# Patient Record
Sex: MALE | Race: WHITE | NOT HISPANIC OR LATINO | Employment: OTHER | URBAN - METROPOLITAN AREA
[De-identification: names, ages, dates, MRNs, and addresses within clinical notes are randomized per-mention and may not be internally consistent; named-entity substitution may affect disease eponyms.]

---

## 2017-02-07 ENCOUNTER — OFFICE VISIT (OUTPATIENT)
Dept: FAMILY MEDICINE | Facility: CLINIC | Age: 72
End: 2017-02-07
Payer: MEDICARE

## 2017-02-07 VITALS
HEART RATE: 60 BPM | SYSTOLIC BLOOD PRESSURE: 104 MMHG | WEIGHT: 175.06 LBS | RESPIRATION RATE: 16 BRPM | DIASTOLIC BLOOD PRESSURE: 60 MMHG | BODY MASS INDEX: 28.25 KG/M2

## 2017-02-07 DIAGNOSIS — E03.4 HYPOTHYROIDISM DUE TO ACQUIRED ATROPHY OF THYROID: ICD-10-CM

## 2017-02-07 DIAGNOSIS — E78.5 DYSLIPIDEMIA: ICD-10-CM

## 2017-02-07 DIAGNOSIS — I10 ESSENTIAL HYPERTENSION: Primary | ICD-10-CM

## 2017-02-07 DIAGNOSIS — I25.810 CORONARY ARTERY DISEASE INVOLVING CORONARY BYPASS GRAFT OF NATIVE HEART WITHOUT ANGINA PECTORIS: ICD-10-CM

## 2017-02-07 DIAGNOSIS — C92.10 CML (CHRONIC MYELOCYTIC LEUKEMIA): ICD-10-CM

## 2017-02-07 PROCEDURE — 99213 OFFICE O/P EST LOW 20 MIN: CPT | Mod: S$GLB,,, | Performed by: FAMILY MEDICINE

## 2017-02-07 PROCEDURE — 99499 UNLISTED E&M SERVICE: CPT | Mod: S$GLB,,, | Performed by: FAMILY MEDICINE

## 2017-02-07 PROCEDURE — 3078F DIAST BP <80 MM HG: CPT | Mod: S$GLB,,, | Performed by: FAMILY MEDICINE

## 2017-02-07 PROCEDURE — 1159F MED LIST DOCD IN RCRD: CPT | Mod: S$GLB,,, | Performed by: FAMILY MEDICINE

## 2017-02-07 PROCEDURE — 3074F SYST BP LT 130 MM HG: CPT | Mod: S$GLB,,, | Performed by: FAMILY MEDICINE

## 2017-02-07 PROCEDURE — 1157F ADVNC CARE PLAN IN RCRD: CPT | Mod: S$GLB,,, | Performed by: FAMILY MEDICINE

## 2017-02-07 PROCEDURE — 1126F AMNT PAIN NOTED NONE PRSNT: CPT | Mod: S$GLB,,, | Performed by: FAMILY MEDICINE

## 2017-02-07 PROCEDURE — 99999 PR PBB SHADOW E&M-EST. PATIENT-LVL II: CPT | Mod: PBBFAC,,, | Performed by: FAMILY MEDICINE

## 2017-02-07 PROCEDURE — 1160F RVW MEDS BY RX/DR IN RCRD: CPT | Mod: S$GLB,,, | Performed by: FAMILY MEDICINE

## 2017-02-07 NOTE — MR AVS SNAPSHOT
[place] [address] [facility] [address]  Phone: [phone]  Fax: [phone]                  James Quan Jr.   2017 9:00 AM   Office Visit    Description:  Male : 1945   Provider:  Alex Mcallister MD   Department:  [facility]           Reason for Visit     fluctuating BP           Diagnoses this Visit        Comments    Essential hypertension    -  Primary     Hypothyroidism due to acquired atrophy of thyroid         Dyslipidemia         CML (chronic myelocytic leukemia)         Coronary artery disease involving coronary bypass graft of native heart without angina pectoris                To Do List           Future Appointments        Provider Department Dept Phone    8/15/2017 8:45 AM Alex Mcallister MD [facility] [phone]      Goals (5 Years of Data)     None      Follow-Up and Disposition     Return in about 6 months (around 2017).    Follow-up and Disposition History      [facility]     [facility] On Call Nurse Care Line -  Assistance  Registered nurses in the [facility] On Call Center provide clinical advisement, health education, appointment booking, and other advisory services.  Call for this free service at [phone].             Medications           Message regarding Medications     Verify the changes and/or additions to your medication regime listed below are the same as discussed with your clinician today.  If any of these changes or additions are incorrect, please notify your healthcare provider.        STOP taking these medications     meloxicam (MOBIC) 7.5 MG tablet TAKE ONE TABLET BY MOUTH ONCE DAILY    simvastatin (ZOCOR) 20 MG tablet Take 20 mg by mouth every evening.            Verify that the below list of medications is an accurate representation of the medications you are currently taking.  If none reported, the list may be blank. If incorrect, please contact your healthcare  provider. Carry this list with you in case of emergency.           Current Medications     ACETAMINOPHEN (TYLENOL 8 HOUR ORAL) Take 500 mg by mouth daily as needed.    aspirin (ECOTRIN) 81 MG EC tablet Take 81 mg by mouth once daily.    atorvastatin (LIPITOR) 40 MG tablet Take 40 mg by mouth once daily.     b complex vitamins tablet Take 1 tablet by mouth once daily.    beta carotene 13609 UNIT capsule Take 25,000 Units by mouth once daily.    catheter (BARD RUBBER UTILITY CATHETER) 14 Fr Misc 1 Units by Misc.(Non-Drug; Combo Route) route every 7 days.    cholecalciferol, vitamin D3, (VITAMIN D3) 2,000 unit Cap Take 1 capsule by mouth every other day.     cyanocobalamin (VITAMIN B-12) 1000 MCG tablet Take 1,000 mcg by mouth nightly.     DOCOSAHEXANOIC ACID/EPA (FISH OIL ORAL) Take 1,200 mg by mouth once daily.    ferrous sulfate 325 (65 FE) MG EC tablet Take 325 mg by mouth once daily.    folic acid (FOLVITE) 400 MCG tablet Take 400 mcg by mouth once daily.    GLEEVEC 400 mg Tab 400 mg once daily.     ibuprofen (ADVIL,MOTRIN) 800 MG tablet Take 1 tablet (800 mg total) by mouth 3 (three) times daily.    isosorbide mononitrate (IMDUR) 30 MG 24 hr tablet Take 30 mg by mouth once daily.    levothyroxine (SYNTHROID) 112 MCG tablet TAKE 1 TABLET EVERY DAY  BEFORE  BREAKFAST    lidocaine HCL 2% (XYLOCAINE) 2 % jelly Per urethra as needed    losartan (COZAAR) 50 MG tablet Take 50 mg by mouth once daily.    lycopene 10 mg Cap Take 1 capsule by mouth nightly.     nitrofurantoin (MACRODANTIN) 50 MG capsule Take 1 capsule (50 mg total) by mouth every evening.    NITROSTAT 0.4 mg SL tablet Place 0.4 mg under the tongue every 5 (five) minutes as needed.     polyethylene glycol (GLYCOLAX) 17 gram PwPk Take 17 g by mouth once daily.    PRASTERONE, DHEA, (DHEA ORAL) Take 1 tablet by mouth once daily. 50 mg. tablet    RANEXA 500 mg Tb12 Take 500 mg by mouth 2 (two) times daily.           Clinical Reference Information           Your  Vitals Were     BP Pulse Resp Weight BMI    104/60 (BP Location: Left arm, Patient Position: Sitting, BP Method: Manual) 60 16 79.4 kg (175 lb 0.7 oz) 28.25 kg/m2      Blood Pressure          Most Recent Value    BP  104/60      Allergies as of 2/7/2017     Niacin Preparations    Bactrim [Sulfamethoxazole-trimethoprim]      Immunizations Administered on Date of Encounter - 2/7/2017     None      Language Assistance Services     ATTENTION: Language assistance services are available, free of charge. Please call [phone].      ATENCIÓN: Si habla español, tiene a alexis disposición servicios gratuitos de asistencia lingüística. Llame al [phone].     MARGO Ý: N?u b?n nói Ti?ng Vi?t, có các d?ch v? h? tr? ngôn ng? mi?n phí dành cho b?n. G?i s? [phone].         [facility] complies with applicable Federal civil rights laws and does not discriminate on the basis of race, color, national origin, age, disability, or sex.

## 2017-02-07 NOTE — PROGRESS NOTES
Subjective:       Patient ID: James Quan Jr. is a 71 y.o. male.    Chief Complaint: fluctuating BP    Pt is a 71 y.o. male who presents for evaluation and management of   Encounter Diagnoses   Name Primary?    Essential hypertension Yes    Hypothyroidism due to acquired atrophy of thyroid     Dyslipidemia     CML (chronic myelocytic leukemia)    .concerned about elevated BP at work---160s systolic. Ok this am     Doing well on current meds. Denies any side effects. Prevention is up to date.  Review of Systems   Constitutional: Negative for fever.   Respiratory: Negative for shortness of breath.    Cardiovascular: Negative for chest pain.   Gastrointestinal: Negative for anal bleeding and blood in stool.   Genitourinary: Negative for dysuria.   Neurological: Negative for dizziness and light-headedness.       Objective:      Physical Exam   Constitutional: He is oriented to person, place, and time. He appears well-developed and well-nourished.   HENT:   Head: Normocephalic and atraumatic.   Right Ear: External ear normal.   Left Ear: External ear normal.   Nose: Nose normal.   Mouth/Throat: Oropharynx is clear and moist.   Eyes: Conjunctivae and EOM are normal. Pupils are equal, round, and reactive to light. Right eye exhibits no discharge. Left eye exhibits no discharge. No scleral icterus.   Neck: Normal range of motion. Neck supple. No JVD present. No tracheal deviation present. No thyromegaly present.   Cardiovascular: Normal rate, regular rhythm, normal heart sounds and intact distal pulses.    No murmur heard.  Pulmonary/Chest: Effort normal and breath sounds normal. No respiratory distress. He has no wheezes. He has no rales. He exhibits no tenderness.   Abdominal: Soft. Bowel sounds are normal. He exhibits no distension and no mass. There is no tenderness. There is no rebound and no guarding.   Musculoskeletal: Normal range of motion.   Lymphadenopathy:     He has no cervical adenopathy.    Neurological: He is alert and oriented to person, place, and time. He has normal reflexes. He displays normal reflexes. No cranial nerve deficit. He exhibits normal muscle tone. Coordination normal.   Skin: Skin is warm and dry.   Psychiatric: He has a normal mood and affect. His behavior is normal. Judgment and thought content normal.       Assessment:       1. Essential hypertension    2. Hypothyroidism due to acquired atrophy of thyroid    3. Dyslipidemia    4. CML (chronic myelocytic leukemia)        Plan:   James was seen today for fluctuating bp.    Diagnoses and all orders for this visit:    Essential hypertension    Hypothyroidism due to acquired atrophy of thyroid    Dyslipidemia    CML (chronic myelocytic leukemia)    continue same   BP log at home   RTC if condition acutely worsens or any other concerns, otherwise RTC as scheduled    No Follow-up on file.

## 2017-03-14 RX ORDER — LIDOCAINE HYDROCHLORIDE 20 MG/ML
JELLY TOPICAL
Qty: 5 EACH | Refills: 11 | Status: SHIPPED | OUTPATIENT
Start: 2017-03-14 | End: 2018-10-18

## 2017-03-14 RX ORDER — NITROFURANTOIN MACROCRYSTALS 50 MG/1
50 CAPSULE ORAL NIGHTLY
Qty: 90 CAPSULE | Refills: 0 | Status: SHIPPED | OUTPATIENT
Start: 2017-03-14 | End: 2017-06-06 | Stop reason: SDUPTHER

## 2017-03-14 NOTE — TELEPHONE ENCOUNTER
----- Message from Mau Seo sent at 3/14/2017  8:24 AM CDT -----  Contact: Wife - Elsa Quan Jr.  MRN: [id number]  : 1945  PCP: Alex Mcallister  Home Phone      [phone]  Work Phone      Not on file.  Mobile          [phone]      MESSAGE: requesting refills on Lidocaine & Macrodantin -- send to [facility]     Call Elsa @ [phone]    PCP: Ary

## 2017-03-30 DIAGNOSIS — E03.9 HYPOTHYROIDISM, UNSPECIFIED TYPE: Primary | ICD-10-CM

## 2017-03-30 RX ORDER — LEVOTHYROXINE SODIUM 100 UG/1
100 TABLET ORAL DAILY
Qty: 30 TABLET | Refills: 11 | Status: SHIPPED | OUTPATIENT
Start: 2017-03-30 | End: 2017-04-25 | Stop reason: SDUPTHER

## 2017-04-18 RX ORDER — LIDOCAINE HYDROCHLORIDE 20 MG/ML
JELLY TOPICAL
Qty: 180 ML | Refills: 3 | Status: SHIPPED | OUTPATIENT
Start: 2017-04-18 | End: 2018-10-18

## 2017-04-18 RX ORDER — LIDOCAINE HYDROCHLORIDE 20 MG/ML
JELLY TOPICAL
Qty: 180 ML | Refills: 3 | Status: SHIPPED | OUTPATIENT
Start: 2017-04-18 | End: 2017-04-18 | Stop reason: SDUPTHER

## 2017-04-25 DIAGNOSIS — E03.9 HYPOTHYROIDISM, UNSPECIFIED TYPE: ICD-10-CM

## 2017-04-25 NOTE — TELEPHONE ENCOUNTER
LOV: 2/07/2017    Received refill request from mail order pharmacy for Levothyroxine 100 mcg take one tablet by mouth once a day. Advise    Pharmacy: [facility] pharmacy

## 2017-04-25 NOTE — TELEPHONE ENCOUNTER
LOV: 2/07/2017   Received refill request from mail order pharmacy for Levothyroxine 100 mcg take one tablet by mouth once a day. Advise     Pharmacy: [facility] pharmacy

## 2017-04-25 NOTE — TELEPHONE ENCOUNTER
Waiting on patient to return call to verify Levothyroxine dose. Have Levothyroxine 100 mcg and Levothyroxine 112 mcg listed on medication list.

## 2017-04-26 RX ORDER — LEVOTHYROXINE SODIUM 100 UG/1
100 TABLET ORAL DAILY
Qty: 90 TABLET | Refills: 1 | Status: SHIPPED | OUTPATIENT
Start: 2017-04-26 | End: 2018-05-01 | Stop reason: SDUPTHER

## 2017-06-06 NOTE — TELEPHONE ENCOUNTER
----- Message from Cait Agrawal sent at 2017 12:47 PM CDT -----  Contact: self  James Quan Jr.  MRN: [id number]  : 1945  PCP: Alex Mcallister  Home Phone      [phone]  Work Phone      Not on file.  Mobile          [phone]      MESSAGE: NEEDS REFILL ON MICRODANTON    Phone: [phone]    PHARMACY: [facility]

## 2017-06-07 RX ORDER — NITROFURANTOIN MACROCRYSTALS 50 MG/1
50 CAPSULE ORAL NIGHTLY
Qty: 90 CAPSULE | Refills: 0 | Status: SHIPPED | OUTPATIENT
Start: 2017-06-07 | End: 2017-08-01 | Stop reason: SDUPTHER

## 2017-08-01 RX ORDER — NITROFURANTOIN MACROCRYSTALS 50 MG/1
50 CAPSULE ORAL NIGHTLY
Qty: 90 CAPSULE | Refills: 0 | Status: SHIPPED | OUTPATIENT
Start: 2017-08-01 | End: 2017-10-18

## 2017-08-03 ENCOUNTER — PATIENT OUTREACH (OUTPATIENT)
Dept: ADMINISTRATIVE | Facility: HOSPITAL | Age: 72
End: 2017-08-03

## 2017-08-03 NOTE — LETTER
August 14, 2017    James Quan Jr.  [address] [place] [address]             [facility]  [address]  [place] [address]  Phone: [phone] Dear Mr. Quan:    [facility] is committed to your overall health.  To help you get the most out of each of your visits, we will review your information to make sure you are up to date on all of your recommended tests and/or procedures.       Dr. Mcallister has found that you may be due for a tetanus vaccine, a pneumonia vaccine, a Hepatitis C screening and an abdominal aortic aneurysm screening.     If you have had any of the above done at another facility, please bring the records or information with you so that your record at [facility] will be complete.  If you would like to schedule any of these, please contact me.     If you are currently taking medication, please bring it with you to your appointment for review.     Also, if you have any type of Advanced Directives, please bring them with you to your office visit so we may scan them into your chart.       Sincerely,       Anel Roberts LPN Clinical Care Coordinator   [facility]   [phone]

## 2017-10-18 ENCOUNTER — OFFICE VISIT (OUTPATIENT)
Dept: INTERNAL MEDICINE | Facility: CLINIC | Age: 72
End: 2017-10-18
Payer: MEDICARE

## 2017-10-18 VITALS
BODY MASS INDEX: 25.85 KG/M2 | RESPIRATION RATE: 16 BRPM | WEIGHT: 164.69 LBS | HEIGHT: 67 IN | HEART RATE: 36 BPM | DIASTOLIC BLOOD PRESSURE: 38 MMHG | OXYGEN SATURATION: 98 % | SYSTOLIC BLOOD PRESSURE: 98 MMHG

## 2017-10-18 DIAGNOSIS — E78.5 DYSLIPIDEMIA: ICD-10-CM

## 2017-10-18 DIAGNOSIS — C92.10 CML (CHRONIC MYELOCYTIC LEUKEMIA): ICD-10-CM

## 2017-10-18 DIAGNOSIS — Z23 IMMUNIZATION DUE: ICD-10-CM

## 2017-10-18 DIAGNOSIS — Z00.00 ENCOUNTER FOR PREVENTIVE HEALTH EXAMINATION: Primary | ICD-10-CM

## 2017-10-18 DIAGNOSIS — I25.810 CORONARY ARTERY DISEASE INVOLVING CORONARY BYPASS GRAFT OF NATIVE HEART WITHOUT ANGINA PECTORIS: ICD-10-CM

## 2017-10-18 DIAGNOSIS — E03.4 HYPOTHYROIDISM DUE TO ACQUIRED ATROPHY OF THYROID: ICD-10-CM

## 2017-10-18 DIAGNOSIS — C61 CA PROSTATE, ADENOCA: ICD-10-CM

## 2017-10-18 PROCEDURE — 3048F LDL-C <100 MG/DL: CPT | Mod: S$GLB,,, | Performed by: NURSE PRACTITIONER

## 2017-10-18 PROCEDURE — G0439 PPPS, SUBSEQ VISIT: HCPCS | Mod: S$GLB,,, | Performed by: NURSE PRACTITIONER

## 2017-10-18 PROCEDURE — 1159F MED LIST DOCD IN RCRD: CPT | Mod: S$GLB,,, | Performed by: NURSE PRACTITIONER

## 2017-10-18 PROCEDURE — 1126F AMNT PAIN NOTED NONE PRSNT: CPT | Mod: S$GLB,,, | Performed by: NURSE PRACTITIONER

## 2017-10-18 PROCEDURE — 1160F RVW MEDS BY RX/DR IN RCRD: CPT | Mod: S$GLB,,, | Performed by: NURSE PRACTITIONER

## 2017-10-18 PROCEDURE — 99999 PR PBB SHADOW E&M-EST. PATIENT-LVL V: CPT | Mod: PBBFAC,,, | Performed by: NURSE PRACTITIONER

## 2017-10-18 PROCEDURE — 99499 UNLISTED E&M SERVICE: CPT | Mod: S$GLB,,, | Performed by: NURSE PRACTITIONER

## 2017-10-18 PROCEDURE — 4040F PNEUMOC VAC/ADMIN/RCVD: CPT | Mod: S$GLB,,, | Performed by: NURSE PRACTITIONER

## 2017-10-18 PROCEDURE — 90714 TD VACC NO PRESV 7 YRS+ IM: CPT | Mod: S$GLB,,, | Performed by: NURSE PRACTITIONER

## 2017-10-18 PROCEDURE — 3078F DIAST BP <80 MM HG: CPT | Mod: S$GLB,,, | Performed by: NURSE PRACTITIONER

## 2017-10-18 PROCEDURE — 1170F FXNL STATUS ASSESSED: CPT | Mod: S$GLB,,, | Performed by: NURSE PRACTITIONER

## 2017-10-18 PROCEDURE — 90471 IMMUNIZATION ADMIN: CPT | Mod: 59,S$GLB,, | Performed by: NURSE PRACTITIONER

## 2017-10-18 PROCEDURE — 90670 PCV13 VACCINE IM: CPT | Mod: S$GLB,,, | Performed by: NURSE PRACTITIONER

## 2017-10-18 PROCEDURE — 3074F SYST BP LT 130 MM HG: CPT | Mod: S$GLB,,, | Performed by: NURSE PRACTITIONER

## 2017-10-18 PROCEDURE — G0009 ADMIN PNEUMOCOCCAL VACCINE: HCPCS | Mod: 59,S$GLB,, | Performed by: NURSE PRACTITIONER

## 2017-10-18 NOTE — PATIENT INSTRUCTIONS
Controlling High Blood Pressure  High blood pressure (hypertension) is often called the silent killer. This is because many people who have it dont know it. High blood pressure is defined as 140/90 mm Hg or higher. Know your blood pressure and remember to check it regularly. Doing so can save your life. Here are some things you can do to help control your blood pressure.    Choose heart-healthy foods  · Select low-salt, low-fat foods. Limit sodium intake to 2,400 mg per day or the amount suggested by your healthcare provider.  · Limit canned, dried, cured, packaged, and fast foods. These can contain a lot of salt.  · Eat 8 to 10 servings of fruits and vegetables every day.  · Choose lean meats, fish, or chicken.  · Eat whole-grain pasta, brown rice, and beans.  · Eat 2 to 3 servings of low-fat or fat-free dairy products.  · Ask your doctor about the DASH eating plan. This plan helps reduce blood pressure.  · When you go to a restaurant, ask that your meal be prepared with no added salt.  Maintain a healthy weight  · Ask your healthcare provider how many calories to eat a day. Then stick to that number.  · Ask your healthcare provider what weight range is healthiest for you. If you are overweight, a weight loss of only 3% to 5% of your body weight can help lower blood pressure. Generally, a good weight loss goal is to lose 10% of your body weight in a year.  · Limit snacks and sweets.  · Get regular exercise.  Get up and get active  · Choose activities you enjoy. Find ones you can do with friends or family. This includes bicycling, dancing, walking, and jogging.  · Park farther away from building entrances.  · Use stairs instead of the elevator.  · When you can, walk or bike instead of driving.  · [place] leaves, garden, or do household repairs.  · Be active at a moderate to vigorous level of physical activity for at least 40 minutes for a minimum of 3 to 4 days a week.   Manage stress  · Make time to relax and  enjoy life. Find time to laugh.  · Communicate your concerns with your loved ones and your healthcare provider.  · Visit with family and friends, and keep up with hobbies.  Limit alcohol and quit smoking  · Men should have no more than 2 drinks per day.  · Women should have no more than 1 drink per day.  · Talk with your healthcare provider about quitting smoking. Smoking significantly increases your risk for heart disease and stroke. Ask your healthcare provider about community smoking cessation programs and other options.  Medicines  If lifestyle changes arent enough, your healthcare provider may prescribe high blood pressure medicine. Take all medicines as prescribed. If you have any questions about your medicines, ask your healthcare provider before stopping or changing them.   Date Last Reviewed: 4/27/2016  © 5231-9868 [employer]. [address] [place] [address], [place] [address]. All rights reserved. This information is not intended as a substitute for professional medical care. Always follow your healthcare professional's instructions.        Counseling and Referral of Other Preventative  (Italic type indicates deductible and co-insurance are waived)    Patient Name: James Quan  Today's Date: 10/18/2017      SERVICE LIMITATIONS RECOMMENDATION    Vaccines    · Pneumococcal (once after 65)    · Influenza (annually)    · Hepatitis B (if medium/high risk)    · Prevnar 13      Hepatitis B medium/high risk factors:       - End-stage renal disease       - Hemophiliacs who received Factor VII or         IX concentrates       - Clients of institutions for the mentally             retarded       - Persons who live in the same house as          a HepB carrier       - Homosexual men       - Illicit injectable drug abusers     Pneumococcal: Done, no repeat necessary     Influenza: Done, repeat in one year     Hepatitis B: Done, no repeat necessary     Prevnar 13: Done, no repeat necessary    Prostate cancer  screening (annually to age 75)     Prostate specific antigen (PSA) Shared decision making with Provider. Sometimes a co-pay may be required if the patient decides to have this test. The USPSTF no longer recommends prostate cancer screening routinely in medicine: every 1 year    Colorectal cancer screening (to age 75)    · Fecal occult blood test (annual)  · Flexible sigmoidoscopy (5y)  · Screening colonoscopy (10y)  · Barium enema   Recommended to patient, declined    Diabetes self-management training (no USPSTF recommendations)  Requires referral by treating physician for patient with diabetes or renal disease. 10 hours of initial DSMT sessions of no less than 30 minutes each in a continuous 12-month period. 2 hours of follow-up DSMT in subsequent years.  N/A    Glaucoma screening (no USPSTF recommendation)  Diabetes mellitus, family history   , age 50 or over    American, age 65 or over  Recommend follow up with eye care professional regularly    Medical nutrition therapy for diabetes or renal disease (no recommended schedule)  Requires referral by treating physician for patient with diabetes or renal disease or kidney transplant within the past 3 years.  Can be provided in same year as diabetes self-management training (DSMT), and CMS recommends medical nutrition therapy take place after DSMT. Up to 3 hours for initial year and 2 hours in subsequent years.  N/A    Cardiovascular screening blood tests (every 5 years)  · Fasting lipid panel  Order as a panel if possible  Done this year, repeat every year    Diabetes screening tests (at least every 3 years, Medicare covers annually or at 6-month intervals for prediabetic patients)  · Fasting blood sugar (FBS) or glucose tolerance test (GTT)  Patient must be diagnosed with one of the following:       - Hypertension       - Dyslipidemia       - Obesity (BMI 30kg/m2)       - Previous elevated impaired FBS or GTT       ... or any two of the  following:       - Overweight (BMI 25 but <30)       - Family history of diabetes       - Age 65 or older       - History of gestational diabetes or birth of baby weighing more than 9 pounds  N/A    Abdominal aortic aneurysm screening (once)  · Sonogram   Limited to patients who meet one of the following criteria:       - Men who are 65-75 years old and have smoked more than 100 cigarette in their lifetime       - Anyone with a family history of abdominal aortic aneurysm       - Anyone recommended for screening by the USPSTF  Done, no repeat necessary    HIV screening (annually for increased risk patients)  · HIV-1 and HIV-2 by EIA, or JEWEL, rapid antibody test or oral mucosa transudate  Patients must be at increased risk for HIV infection per USPSTF guidelines or pregnant. Tests covered annually for patient at increased risk or as requested by the patient. Pregnant patients may receive up to 3 tests during pregnancy.  Risks discussed, screening is not recommended    Smoking cessation counseling (up to 8 sessions per year)  Patients must be asymptomatic of tobacco-related conditions to receive as a preventative service.  Non-smoker    Subsequent annual wellness visit  At least 12 months since last AWV  Return in one year     The following information is provided to all patients.  This information is to help you find resources for any of the problems found today that may be affecting your health:                Living healthy guide: www.[facility].[place].gov      Understanding Diabetes: www.diabetes.org      Eating healthy: www.cdc.gov/healthyweight      CDC home safety checklist: www.cdc.gov/steadi/patient.html      Agency on Aging: www.goea.[place].[facility]      Alcoholics anonymous (AA): www.aa.org      Physical Activity: www.leticia.nih.gov/mu3ycoe      Tobacco use: www.quitwithusla.org

## 2017-10-18 NOTE — PROGRESS NOTES
"James Quan presented for a  Medicare AWV and comprehensive Health Risk Assessment today. The following components were reviewed and updated:    · Medical history  · Family History  · Social history  · Allergies and Current Medications  · Health Risk Assessment  · Health Maintenance  · Care Team     ** See Completed Assessments for Annual Wellness Visit within the encounter summary.**       The following assessments were completed:  · Living Situation  · CAGE  · Depression Screening  · Timed Get Up and Go  · Whisper Test  · Cognitive Function Screening  · Nutrition Screening  · ADL Screening  · PAQ Screening    Vitals:    10/18/17 1017   BP: (!) 98/38   BP Location: Left arm   Patient Position: Sitting   Pulse: (!) 36   Resp: 16   SpO2: 98%   Weight: 74.7 kg (164 lb 10.9 oz)   Height: 5' 7" (1.702 m)     Body mass index is 25.79 kg/m².  Physical Exam   Constitutional: He is oriented to person, place, and time. Vital signs are normal. He appears well-developed and well-nourished. He is active and cooperative. No distress.   HENT:   Head: Normocephalic and atraumatic.   Right Ear: External ear normal.   Left Ear: External ear normal.   Nose: Nose normal.   Mouth/Throat: Oropharynx is clear and moist and mucous membranes are normal. Normal dentition.   Eyes: Conjunctivae, EOM and lids are normal. Pupils are equal, round, and reactive to light.   Neck: Trachea normal. Neck supple.   Cardiovascular: Regular rhythm, normal heart sounds and intact distal pulses.  Bradycardia present.    No murmur heard.  Pulses:       Radial pulses are 2+ on the right side, and 2+ on the left side.        Dorsalis pedis pulses are 2+ on the right side, and 2+ on the left side.        Posterior tibial pulses are 2+ on the right side, and 2+ on the left side.   Pulmonary/Chest: Effort normal and breath sounds normal. No respiratory distress. He has no wheezes. He has no rales.   Abdominal: Soft. Bowel sounds are normal. He exhibits no " distension. There is no tenderness.   Musculoskeletal: He exhibits no edema or tenderness.   Neurological: He is alert and oriented to person, place, and time. He has normal strength. No cranial nerve deficit. He exhibits normal muscle tone. Coordination and gait normal.   Skin: Skin is warm, dry and intact. No rash noted. No erythema.   Psychiatric: He has a normal mood and affect. His speech is normal and behavior is normal. Judgment and thought content normal. Cognition and memory are normal.   Vitals reviewed.        Diagnoses and health risks identified today and associated recommendations/orders:    1. Encounter for preventive health examination  PLAN: follow a low fat, low cholesterol diet, reduce salt in diet and cooking, improve dietary compliance, use calcium 1 gram daily with Vit D, continue current medications, continue current healthy lifestyle patterns and return for routine annual checkups    2. Immunization due  - (In Office Administered) Td Vaccine - Preservative Free  - (In Office Administered) Pneumococcal Conjugate Vaccine (13 Valent) (IM)    3. Urinary complication  Self cath once daily without complication, followed by urology    4. Dyslipidemia  Stable, followed by cardiology,     5. Coronary artery disease involving coronary bypass graft of native heart without angina pectoris  Stable, followed by cardiolgoy    6. Chronic urethral stricture  See above    7. CA prostate, adenoca  Stable, followed by Urology    8. CML (chronic myelocytic leukemia)  stable    9. Hypothyroidism due to acquired atrophy of thyroid  stable      Provided James with a 5-10 year written screening schedule and personal prevention plan. Recommendations were developed using the USPSTF age appropriate recommendations. Education, counseling, and referrals were provided as needed. After Visit Summary printed and given to patient which includes a list of additional screenings\tests needed.    Return in about 1 year  (around 10/18/2018).    Rosy Frausto, NP

## 2017-10-24 ENCOUNTER — OFFICE VISIT (OUTPATIENT)
Dept: FAMILY MEDICINE | Facility: CLINIC | Age: 72
End: 2017-10-24
Payer: MEDICARE

## 2017-10-24 VITALS
SYSTOLIC BLOOD PRESSURE: 100 MMHG | BODY MASS INDEX: 25.81 KG/M2 | HEIGHT: 67 IN | HEART RATE: 62 BPM | DIASTOLIC BLOOD PRESSURE: 58 MMHG | WEIGHT: 164.44 LBS | RESPIRATION RATE: 20 BRPM

## 2017-10-24 DIAGNOSIS — E78.5 DYSLIPIDEMIA: ICD-10-CM

## 2017-10-24 DIAGNOSIS — T50.905A DRUG-INDUCED BRADYCARDIA: ICD-10-CM

## 2017-10-24 DIAGNOSIS — C92.10 CML (CHRONIC MYELOCYTIC LEUKEMIA): ICD-10-CM

## 2017-10-24 DIAGNOSIS — R00.1 DRUG-INDUCED BRADYCARDIA: ICD-10-CM

## 2017-10-24 DIAGNOSIS — E03.4 HYPOTHYROIDISM DUE TO ACQUIRED ATROPHY OF THYROID: Primary | ICD-10-CM

## 2017-10-24 DIAGNOSIS — I25.810 CORONARY ARTERY DISEASE INVOLVING CORONARY BYPASS GRAFT OF NATIVE HEART WITHOUT ANGINA PECTORIS: ICD-10-CM

## 2017-10-24 DIAGNOSIS — C61 CA PROSTATE, ADENOCA: ICD-10-CM

## 2017-10-24 LAB
T4 FREE SERPL-MCNC: 1.26 NG/DL
TSH SERPL DL<=0.005 MIU/L-ACNC: 0.21 UIU/ML

## 2017-10-24 PROCEDURE — 84439 ASSAY OF FREE THYROXINE: CPT

## 2017-10-24 PROCEDURE — 84443 ASSAY THYROID STIM HORMONE: CPT

## 2017-10-24 PROCEDURE — 99499 UNLISTED E&M SERVICE: CPT | Mod: S$GLB,,, | Performed by: FAMILY MEDICINE

## 2017-10-24 PROCEDURE — 99999 PR PBB SHADOW E&M-EST. PATIENT-LVL III: CPT | Mod: PBBFAC,,, | Performed by: FAMILY MEDICINE

## 2017-10-24 PROCEDURE — 36415 COLL VENOUS BLD VENIPUNCTURE: CPT | Mod: S$GLB,,, | Performed by: FAMILY MEDICINE

## 2017-10-24 PROCEDURE — 99214 OFFICE O/P EST MOD 30 MIN: CPT | Mod: S$GLB,,, | Performed by: FAMILY MEDICINE

## 2017-10-24 RX ORDER — ACETAMINOPHEN 500MG/15ML
LIQUID (ML) ORAL
COMMUNITY
Start: 2017-08-02 | End: 2018-10-18

## 2017-10-24 RX ORDER — RANOLAZINE 1000 MG/1
500 TABLET, FILM COATED, EXTENDED RELEASE ORAL 2 TIMES DAILY
COMMUNITY
Start: 2017-10-07 | End: 2020-11-27

## 2017-10-24 RX ORDER — LOSARTAN POTASSIUM 25 MG/1
50 TABLET ORAL DAILY
Status: ON HOLD | COMMUNITY
End: 2019-05-23 | Stop reason: HOSPADM

## 2017-10-24 NOTE — PROGRESS NOTES
Subjective:       Patient ID: James Quan Jr. is a 72 y.o. male.    Chief Complaint: Follow-up (6 mo)    Pt is a 72 y.o. male who presents for evaluation and management of   Encounter Diagnoses   Name Primary?    Hypothyroidism due to acquired atrophy of thyroid Yes    Dyslipidemia     Coronary artery disease involving coronary bypass graft of native heart without angina pectoris     CML (chronic myelocytic leukemia)     Chronic urethral stricture     CA prostate, adenoca     Drug-induced bradycardia    .HR 36 at last visit. Also low BP, systolic 93.  Last TSH was a little low, reduced his dose synthroid to 100mcg daily. Due for recheck   He cut his ranexa and cozarr  in half on his own, and his HR and BP improved. HR 62 today     Doing well on current meds. Denies any side effects. Prevention is up to date.    Review of Systems   Constitutional: Negative for fever.   Respiratory: Negative for shortness of breath.    Cardiovascular: Negative for chest pain.   Gastrointestinal: Negative for anal bleeding and blood in stool.   Genitourinary: Negative for dysuria.        Self caths    Neurological: Negative for dizziness and light-headedness.       Objective:      Physical Exam   Constitutional: He is oriented to person, place, and time. He appears well-developed and well-nourished.   HENT:   Head: Normocephalic and atraumatic.   Right Ear: External ear normal.   Left Ear: External ear normal.   Nose: Nose normal.   Mouth/Throat: Oropharynx is clear and moist.   Eyes: Conjunctivae and EOM are normal. Pupils are equal, round, and reactive to light. Right eye exhibits no discharge. Left eye exhibits no discharge. No scleral icterus.   Neck: Normal range of motion. Neck supple. No JVD present. No tracheal deviation present. No thyromegaly present.   Cardiovascular: Normal rate, regular rhythm, normal heart sounds and intact distal pulses.    No murmur heard.  Pulmonary/Chest: Effort normal and breath  sounds normal. No respiratory distress. He has no wheezes. He has no rales. He exhibits no tenderness.   Abdominal: Soft. Bowel sounds are normal. He exhibits no distension and no mass. There is no tenderness. There is no rebound and no guarding.   Musculoskeletal: Normal range of motion.   Lymphadenopathy:     He has no cervical adenopathy.   Neurological: He is alert and oriented to person, place, and time. He has normal reflexes. He displays normal reflexes. No cranial nerve deficit. He exhibits normal muscle tone. Coordination normal.   Skin: Skin is warm and dry.   Psychiatric: He has a normal mood and affect. His behavior is normal. Judgment and thought content normal.       Assessment:       1. Hypothyroidism due to acquired atrophy of thyroid    2. Dyslipidemia    3. Coronary artery disease involving coronary bypass graft of native heart without angina pectoris    4. CML (chronic myelocytic leukemia)    5. Chronic urethral stricture    6. CA prostate, adenoca    7. Drug-induced bradycardia        Plan:   James was seen today for follow-up.    Diagnoses and all orders for this visit:    Hypothyroidism due to acquired atrophy of thyroid  -     TSH; Future    Dyslipidemia  statin    Coronary artery disease involving coronary bypass graft of native heart without angina pectoris  Sees [facility]     CML (chronic myelocytic leukemia)  Follows with [facility]     Chronic urethral stricture  Self caths, no recent infections     CA prostate, adenoca  Remission, sees Br[facility]     Drug-induced bradycardia  Resolved with halfing of renexa, continue 500mg       RTC 6 months       No Follow-up on file.

## 2017-12-14 ENCOUNTER — TELEPHONE (OUTPATIENT)
Dept: FAMILY MEDICINE | Facility: CLINIC | Age: 72
End: 2017-12-14

## 2017-12-14 NOTE — TELEPHONE ENCOUNTER
Received blood work from [facility].  It was placed on AD desk.  Per AD, his TSH is normal for him.  Wants patient to stay on current dose of synthroid.  Patient was notified and verbalized understanding.  TSH was 0.28.

## 2017-12-26 NOTE — PROGRESS NOTES
"Test results not available for viewing. Computer saying I need to "download software fore viewing"  These results need to be put back in the computer in the media tab correctly so I can view. Thanks!"

## 2018-01-16 ENCOUNTER — TELEPHONE (OUTPATIENT)
Dept: ADMINISTRATIVE | Facility: HOSPITAL | Age: 73
End: 2018-01-16

## 2018-01-18 ENCOUNTER — TELEPHONE (OUTPATIENT)
Dept: ADMINISTRATIVE | Facility: HOSPITAL | Age: 73
End: 2018-01-18

## 2018-05-01 DIAGNOSIS — E03.9 HYPOTHYROIDISM, UNSPECIFIED TYPE: ICD-10-CM

## 2018-05-01 RX ORDER — LEVOTHYROXINE SODIUM 100 UG/1
TABLET ORAL
Qty: 90 TABLET | Refills: 3 | Status: SHIPPED | OUTPATIENT
Start: 2018-05-01 | End: 2019-05-03 | Stop reason: SDUPTHER

## 2018-06-21 ENCOUNTER — PES CALL (OUTPATIENT)
Dept: ADMINISTRATIVE | Facility: CLINIC | Age: 73
End: 2018-06-21

## 2018-07-24 ENCOUNTER — PES CALL (OUTPATIENT)
Dept: ADMINISTRATIVE | Facility: CLINIC | Age: 73
End: 2018-07-24

## 2018-08-03 ENCOUNTER — PES CALL (OUTPATIENT)
Dept: ADMINISTRATIVE | Facility: CLINIC | Age: 73
End: 2018-08-03

## 2018-10-18 ENCOUNTER — HOSPITAL ENCOUNTER (EMERGENCY)
Facility: HOSPITAL | Age: 73
Discharge: ANOTHER HOSPITAL | End: 2018-10-18
Attending: SURGERY
Payer: MEDICARE

## 2018-10-18 VITALS
SYSTOLIC BLOOD PRESSURE: 128 MMHG | OXYGEN SATURATION: 96 % | DIASTOLIC BLOOD PRESSURE: 50 MMHG | HEART RATE: 52 BPM | RESPIRATION RATE: 16 BRPM | TEMPERATURE: 97 F

## 2018-10-18 DIAGNOSIS — R53.1 WEAKNESS: ICD-10-CM

## 2018-10-18 DIAGNOSIS — I49.9 ARRHYTHMIA: ICD-10-CM

## 2018-10-18 DIAGNOSIS — R55 SYNCOPE: ICD-10-CM

## 2018-10-18 LAB
ALBUMIN SERPL BCP-MCNC: 3.9 G/DL
ALP SERPL-CCNC: 64 U/L
ALT SERPL W/O P-5'-P-CCNC: 31 U/L
ANION GAP SERPL CALC-SCNC: 8 MMOL/L
APTT BLDCRRT: 22.7 SEC
AST SERPL-CCNC: 28 U/L
BASOPHILS # BLD AUTO: 0.02 K/UL
BASOPHILS NFR BLD: 0.3 %
BILIRUB SERPL-MCNC: 0.6 MG/DL
BNP SERPL-MCNC: 111 PG/ML
BUN SERPL-MCNC: 11 MG/DL
CALCIUM SERPL-MCNC: 8.7 MG/DL
CHLORIDE SERPL-SCNC: 106 MMOL/L
CK MB SERPL-MCNC: 2 NG/ML
CK MB SERPL-RTO: 1.6 %
CK SERPL-CCNC: 127 U/L
CK SERPL-CCNC: 127 U/L
CO2 SERPL-SCNC: 27 MMOL/L
CREAT SERPL-MCNC: 1.2 MG/DL
D DIMER PPP IA.FEU-MCNC: 0.43 MG/L FEU
DIFFERENTIAL METHOD: ABNORMAL
EOSINOPHIL # BLD AUTO: 0.1 K/UL
EOSINOPHIL NFR BLD: 1.5 %
ERYTHROCYTE [DISTWIDTH] IN BLOOD BY AUTOMATED COUNT: 14.1 %
EST. GFR  (AFRICAN AMERICAN): >60 ML/MIN/1.73 M^2
EST. GFR  (NON AFRICAN AMERICAN): >60 ML/MIN/1.73 M^2
GLUCOSE SERPL-MCNC: 136 MG/DL
HCT VFR BLD AUTO: 38.2 %
HGB BLD-MCNC: 13.1 G/DL
INR PPP: 1
LYMPHOCYTES # BLD AUTO: 1.2 K/UL
LYMPHOCYTES NFR BLD: 18 %
MAGNESIUM SERPL-MCNC: 2.2 MG/DL
MCH RBC QN AUTO: 34.3 PG
MCHC RBC AUTO-ENTMCNC: 34.3 G/DL
MCV RBC AUTO: 100 FL
MONOCYTES # BLD AUTO: 0.6 K/UL
MONOCYTES NFR BLD: 9.2 %
NEUTROPHILS # BLD AUTO: 4.7 K/UL
NEUTROPHILS NFR BLD: 71 %
PHOSPHATE SERPL-MCNC: 2.4 MG/DL
PLATELET # BLD AUTO: 254 K/UL
PMV BLD AUTO: 9.8 FL
POTASSIUM SERPL-SCNC: 3.9 MMOL/L
PROT SERPL-MCNC: 6.8 G/DL
PROTHROMBIN TIME: 10.1 SEC
RBC # BLD AUTO: 3.82 M/UL
SODIUM SERPL-SCNC: 141 MMOL/L
TROPONIN I SERPL DL<=0.01 NG/ML-MCNC: 0.01 NG/ML
TSH SERPL DL<=0.005 MIU/L-ACNC: 0.58 UIU/ML
WBC # BLD AUTO: 6.61 K/UL

## 2018-10-18 PROCEDURE — 93010 ELECTROCARDIOGRAM REPORT: CPT | Mod: HCNC,76,, | Performed by: INTERNAL MEDICINE

## 2018-10-18 PROCEDURE — 85379 FIBRIN DEGRADATION QUANT: CPT

## 2018-10-18 PROCEDURE — 99285 EMERGENCY DEPT VISIT HI MDM: CPT | Mod: 25

## 2018-10-18 PROCEDURE — 85025 COMPLETE CBC W/AUTO DIFF WBC: CPT

## 2018-10-18 PROCEDURE — 83735 ASSAY OF MAGNESIUM: CPT

## 2018-10-18 PROCEDURE — 84484 ASSAY OF TROPONIN QUANT: CPT

## 2018-10-18 PROCEDURE — 93005 ELECTROCARDIOGRAM TRACING: CPT

## 2018-10-18 PROCEDURE — 25000003 PHARM REV CODE 250: Performed by: SURGERY

## 2018-10-18 PROCEDURE — 96360 HYDRATION IV INFUSION INIT: CPT

## 2018-10-18 PROCEDURE — 84100 ASSAY OF PHOSPHORUS: CPT

## 2018-10-18 PROCEDURE — 83880 ASSAY OF NATRIURETIC PEPTIDE: CPT

## 2018-10-18 PROCEDURE — 82553 CREATINE MB FRACTION: CPT

## 2018-10-18 PROCEDURE — 36415 COLL VENOUS BLD VENIPUNCTURE: CPT

## 2018-10-18 PROCEDURE — 85610 PROTHROMBIN TIME: CPT

## 2018-10-18 PROCEDURE — 85730 THROMBOPLASTIN TIME PARTIAL: CPT

## 2018-10-18 PROCEDURE — 80053 COMPREHEN METABOLIC PANEL: CPT

## 2018-10-18 PROCEDURE — 96361 HYDRATE IV INFUSION ADD-ON: CPT

## 2018-10-18 PROCEDURE — 84443 ASSAY THYROID STIM HORMONE: CPT

## 2018-10-18 PROCEDURE — 82550 ASSAY OF CK (CPK): CPT

## 2018-10-18 RX ORDER — AMOXICILLIN 500 MG
CAPSULE ORAL DAILY
COMMUNITY
End: 2020-05-04 | Stop reason: ALTCHOICE

## 2018-10-18 RX ORDER — CHOLECALCIFEROL (VITAMIN D3) 25 MCG
2000 TABLET ORAL DAILY
COMMUNITY

## 2018-10-18 RX ORDER — SODIUM CHLORIDE 9 MG/ML
1000 INJECTION, SOLUTION INTRAVENOUS
Status: DISCONTINUED | OUTPATIENT
Start: 2018-10-18 | End: 2018-10-18 | Stop reason: HOSPADM

## 2018-10-18 RX ORDER — SODIUM CHLORIDE 9 MG/ML
1000 INJECTION, SOLUTION INTRAVENOUS
Status: COMPLETED | OUTPATIENT
Start: 2018-10-18 | End: 2018-10-18

## 2018-10-18 RX ADMIN — SODIUM CHLORIDE 1000 ML: 0.9 INJECTION, SOLUTION INTRAVENOUS at 10:10

## 2018-10-18 NOTE — ED TRIAGE NOTES
Patient reports he blacked out twice at home this morning prior to arrival. He was sitting down each time this happened.

## 2018-10-18 NOTE — CONSULTS
[facility]  Cardiology  Consult Note    Patient Name: James Quan Jr.  MRN: [id number]  Admission Date: 10/18/2018  Hospital Length of Stay: 0 days  Code Status: No Order   Attending Provider: Castro Quintero MD   Consulting Provider: SURYA Loyd  Primary Care Physician: Alex Mcallister MD  Principal Problem:<principal problem not specified>    Patient information was obtained from patient, past medical records and ER records.     Inpatient consult to Cardiology[facility]  Consult performed by: Harshad Saucedo MD  Consult ordered by: Castro Quintero MD        Subjective:     Chief Complaint: syncope      HPI: 73 year old male presents to ED with 2 episodes of syncope at rest today prompting him to come to ER. It is associated with mild nausea. No chest pain, SOB, or palpitations.     Past Medical History:   Diagnosis Date    Anemia     Aplasia bone marrow     Asthma     CA of prostate     CML (chronic myeloid leukemia)     Coronary artery disease     Heart attack 6/17/2014    Heart failure     Hyperlipidemia     Hypertension     Hypothyroidism     Kidney stones     Prostate cancer     Thyroid disease     Urinary incontinence        Past Surgical History:   Procedure Laterality Date    APPENDECTOMY  1966    CHOLECYSTECTOMY  1980    CORONARY ARTERY BYPASS GRAFT  1988    CORONARY STENT PLACEMENT  1990    CYSTOSCOPY      CYSTOSCOPY N/A 12/3/2014    Performed [facility] at [facility] OR 1ST FLR    CYSTOSCOPY N/A 5/7/2014    [facility] at [facility] OR    DILATION-URETHRA N/A 12/3/2014    Performed by [facility] at [facility] OR 1ST FLR    DILATION-URETHRA N/A 5/7/2014    Performed by [facility] at [facility] OR    heart attack  6/17/2014    INSERTION-CATHETER N/A 5/7/2014    Performed by [facility] at [facility] OR    PROSTATE SURGERY      TURP    radiation      for ca prostate    TONSILLECTOMY      As child    TRANSURETHRAL  RESECTION OF PROSTATE      x3    TRANSURETHRAL RESECTION OF PROSTATE      urethral dilation      VASECTOMY  1979       Review of patient's allergies indicates:   Allergen Reactions    Niacin preparations Rash    Bactrim [sulfamethoxazole-trimethoprim] Hives and Itching       No current facility-administered medications on file prior to encounter.      Current Outpatient Medications on File Prior to Encounter   Medication Sig    ascorbic acid, vitamin C, (VITAMIN C) 100 MG tablet Take 100 mg by mouth once daily.    aspirin (ECOTRIN) 81 MG EC tablet Take 81 mg by mouth once daily.    atorvastatin (LIPITOR) 40 MG tablet Take 40 mg by mouth once daily.     b complex vitamins tablet Take 1 tablet by mouth once daily.    beta carotene 85071 UNIT capsule Take 10,000 Units by mouth once daily.     catheter (BARD RUBBER UTILITY CATHETER) 14 Fr Misc 1 Units by Misc.(Non-Drug; Combo Route) route every 7 days. (Patient taking differently: 1 Units by Misc.(Non-Drug; Combo Route) route every other day. )    cyanocobalamin (VITAMIN B-12) 1000 MCG tablet Take 1,000 mcg by mouth nightly.     ferrous sulfate 325 (65 FE) MG EC tablet Take 325 mg by mouth once daily.    fish oil-omega-3 fatty acids 300-1,000 mg capsule Take by mouth once daily.    folic acid (FOLVITE) 400 MCG tablet Take 400 mcg by mouth once daily.    GLEEVEC 400 mg Tab 400 mg once daily.     isosorbide mononitrate (IMDUR) 30 MG 24 hr tablet Take 30 mg by mouth once daily.    levothyroxine (SYNTHROID) 100 MCG tablet TAKE 1 TABLET EVERY DAY    losartan (COZAAR) 25 MG tablet Take 50 mg by mouth once daily.     lycopene 10 mg Cap Take 1 capsule by mouth nightly.     PRASTERONE, DHEA, (DHEA ORAL) Take 25 mg by mouth once daily. 50 mg. tablet     RANEXA 1,000 mg Tb12     vitamin D (VITAMIN D3) 1000 units Tab Take 2,000 Units by mouth once daily.    NITROSTAT 0.4 mg SL tablet Place 0.4 mg under the tongue every 5 (five) minutes as needed.      [DISCONTINUED] ACETAMINOPHEN (TYLENOL 8 HOUR ORAL) Take 500 mg by mouth daily as needed.    [DISCONTINUED] acetaminophen 500 mg/15 mL Liqd     [DISCONTINUED] cholecalciferol, vitamin D3, (VITAMIN D3) 2,000 unit Cap Take 1 capsule by mouth every other day.     [DISCONTINUED] DOCOSAHEXANOIC ACID/EPA (FISH OIL ORAL) Take 1,200 mg by mouth once daily.    [DISCONTINUED] FLUAD 4628-2932, 65 YR UP,,PF, 45 mcg (15 mcg x 3)/0.5 mL Syrg     [DISCONTINUED] ibuprofen (ADVIL,MOTRIN) 800 MG tablet Take 1 tablet (800 mg total) by mouth 3 (three) times daily.    [DISCONTINUED] lidocaine HCL 2% (XYLOCAINE) 2 % jelly Per urethra as needed    [DISCONTINUED] lidocaine HCL 2% (XYLOCAINE) 2 % jelly Apply 8 mls per catheterization twice a week    [DISCONTINUED] polyethylene glycol (GLYCOLAX) 17 gram PwPk Take 17 g by mouth once daily.     Family History     Problem Relation (Age of Onset)    Diabetes Father        Tobacco Use    Smoking status: Former Smoker     Packs/day: 1.00     Years: 25.00     Pack years: 25.00     Types: Cigarettes     Last attempt to quit: 1988     Years since quittin.4    Smokeless tobacco: Never Used   Substance and Sexual Activity    Alcohol use: No    Drug use: No    Sexual activity: Yes     Partners: Female     ROS   Constitutional: syncope    Eyes: Negative    Respiratory: Negative    Cardiovascular: Negative   Gastrointestinal: Negative   Genitourinary: Negative   Musculoskeletal: Negative   Skin: Negative .   Neurological: Negative   Objective:     Vital Signs (Most Recent):  Temp: 96.8 °F (36 °C) (10/18/18 1006)  Pulse: (!) 52 (10/18/18 1006)  Resp: 18 (10/18/18 1006)  BP: 132/78 (10/18/18 1006)  SpO2: 96 % (10/18/18 1006) Vital Signs (24h Range):  Temp:  [96.8 °F (36 °C)] 96.8 °F (36 °C)  Pulse:  [52] 52  Resp:  [18] 18  SpO2:  [96 %] 96 %  BP: (132)/(78) 132/78        There is no height or weight on file to calculate BMI.    SpO2: 96 %  O2 Device (Oxygen Therapy): room air    No  intake or output data in the 24 hours ending 10/18/18 1153    Lines/Drains/Airways     Peripheral Intravenous Line                 Peripheral IV - Single Lumen 10/18/18 1025 Left Antecubital less than 1 day                Physical Exam  General appearance: alert, appears stated age and cooperative  Head: Normocephalic, without obvious abnormality, atraumatic  Eyes: conjunctivae/corneas clear. PERRL  Neck: no carotid bruit, no JVD and supple, symmetrical, trachea midline  Lungs: clear to auscultation bilaterally, normal respiratory effort  Chest Wall: no tenderness  Heart: slow rate, S1, S2 normal, no murmur, click, rub or gallop  Abdomen: soft, non-tender; bowel sounds normal; no masses,  no organomegaly  Extremities: Extremities normal, atraumatic, no cyanosis, clubbing, or edema  Pulses: Dorsalis Pedis R: 2+ (normal)/L: 2+ (normal)  Skin: Skin color, texture, turgor normal. No rashes or lesions  Neurologic: Normal mood and affect  Alert and oriented X 3  Significant Labs:   ABG: No results for input(s): PH, PCO2, HCO3, POCSATURATED, BE in the last 48 hours., Blood Culture: No results for input(s): LABBLOO in the last 48 hours., BMP:   Recent Labs   Lab 10/18/18  1024   *      K 3.9      CO2 27   BUN 11   CREATININE 1.2   CALCIUM 8.7   MG 2.2   , CMP   Recent Labs   Lab 10/18/18  1024      K 3.9      CO2 27   *   BUN 11   CREATININE 1.2   CALCIUM 8.7   PROT 6.8   ALBUMIN 3.9   BILITOT 0.6   ALKPHOS 64   AST 28   ALT 31   ANIONGAP 8   ESTGFRAFRICA >60   EGFRNONAA >60   , CBC   Recent Labs   Lab 10/18/18  1024   WBC 6.61   HGB 13.1*   HCT 38.2*      , INR   Recent Labs   Lab 10/18/18  1024   INR 1.0   , Lipid Panel No results for input(s): CHOL, HDL, LDLCALC, TRIG, CHOLHDL in the last 48 hours.,   Pathology Results  (Last 10 years)    None      , Troponin   Recent Labs   Lab 10/18/18  1024   TROPONINI 0.008   , All pertinent lab results from the last 24 hours have been  reviewed., None and     Significant Imaging: Cardiac Cath: , CT scan: CT ABDOMEN PELVIS WITH CONTRAST: No results found for this visit on 10/18/18., CT ABDOMEN PELVIS WITHOUT CONTRAST: No results found for this visit on 10/18/18. and , Echocardiogram: , EKG: , Stress Test:, X-Ray: CXR: X-Ray Chest 1 View (CXR): No results found for this visit on 10/18/18. and   Assessment and Plan:     There are no hospital problems to display for this patient.      VTE Risk Mitigation (From admission, onward)    None        Current Facility-Administered Medications   Medication    0.9%  NaCl infusion     Current Outpatient Medications   Medication Sig    ascorbic acid, vitamin C, (VITAMIN C) 100 MG tablet Take 100 mg by mouth once daily.    aspirin (ECOTRIN) 81 MG EC tablet Take 81 mg by mouth once daily.    atorvastatin (LIPITOR) 40 MG tablet Take 40 mg by mouth once daily.     b complex vitamins tablet Take 1 tablet by mouth once daily.    beta carotene 98370 UNIT capsule Take 10,000 Units by mouth once daily.     catheter (BARD RUBBER UTILITY CATHETER) 14 Fr Misc 1 Units by Misc.(Non-Drug; Combo Route) route every 7 days. (Patient taking differently: 1 Units by Misc.(Non-Drug; Combo Route) route every other day. )    cyanocobalamin (VITAMIN B-12) 1000 MCG tablet Take 1,000 mcg by mouth nightly.     ferrous sulfate 325 (65 FE) MG EC tablet Take 325 mg by mouth once daily.    fish oil-omega-3 fatty acids 300-1,000 mg capsule Take by mouth once daily.    folic acid (FOLVITE) 400 MCG tablet Take 400 mcg by mouth once daily.    GLEEVEC 400 mg Tab 400 mg once daily.     isosorbide mononitrate (IMDUR) 30 MG 24 hr tablet Take 30 mg by mouth once daily.    levothyroxine (SYNTHROID) 100 MCG tablet TAKE 1 TABLET EVERY DAY    losartan (COZAAR) 25 MG tablet Take 50 mg by mouth once daily.     lycopene 10 mg Cap Take 1 capsule by mouth nightly.     PRASTERONE, DHEA, (DHEA ORAL) Take 25 mg by mouth once daily. 50 mg. tablet      RANEXA 1,000 mg Tb12     vitamin D (VITAMIN D3) 1000 units Tab Take 2,000 Units by mouth once daily.    NITROSTAT 0.4 mg SL tablet Place 0.4 mg under the tongue every 5 (five) minutes as needed.      New onset Syncope- Sick Sinus S. ;12 lead EKG with bifasicular block with sinus bradycardia. While in the room examining patient. Telemetry revealed multiple long over 4 second pauses and 2-1 AV block. Patient had two more syncopal episodes in ED and nursing staff reports pauses on tele at time of event. (not captured)  Patient is on no AV diamond blocking agents.       Carotid US today with no significant stenosis.   Echo 6/18- normal EF, no significant valvular pathology.   MPI 2017 no reversible ischemia.   LHC 2014- severe 3 vessel CAD S/P CABG occluded SVG and native on rt, patent LIMA to LAD and SVG to diag 1.     PLAN:  Transfer for PM implantation  Use atropine prn on way        SURYA Loyd  Cardiology   [facility]  I attest that I have personally seen and examined this patient. I have reviewed and discussed the management in detail as outlined above.

## 2018-10-18 NOTE — ED PROVIDER NOTES
[facility]                                                 Chief Complaint  73 y.o. male with Loss of Consciousness    History of Present Illness  James Quan Jr. presents to the emergency room with syncope  Patient passed out twice this morning, no history of syncope or LOC per wife  Patient is alert and appropriate, had 2 syncopal episodes in the ER on arrival  Patient blacks out with skipped beats on the telemedicine cardiac monitoring  Patient denies chest pain, patient denies shortness of breath, no neuro deficit  Discussed extensively with Cardiology, transfer for arrhythmia specialist ASAP    The history is provided by the patient   device was not used during this ER visit    Past Medical History   -- Anemia    -- Aplasia bone marrow    -- Asthma    -- CA of prostate    -- CML (chronic myeloid leukemia)    -- Coronary artery disease    -- Heart attack    -- Heart failure    -- Hyperlipidemia    -- Hypertension    -- Hypothyroidism    -- Kidney stones    -- Prostate cancer    -- Thyroid disease    -- Urinary incontinence      Past Surgical History   -- APPENDECTOMY     -- CHOLECYSTECTOMY     -- CORONARY ARTERY BYPASS GRAFT     -- CORONARY STENT PLACEMENT     -- CYSTOSCOPY     -- CYSTOSCOPY     -- CYSTOSCOPY     -- DILATION-URETHRA     -- DILATION-URETHRA     -- heart attack     -- INSERTION-CATHETER     -- PROSTATE SURGERY     -- radiation     -- TONSILLECTOMY     -- TRANSURETHRAL RESECTION OF PROSTATE     -- TRANSURETHRAL RESECTION OF PROSTATE     -- urethral dilation     -- VASECTOMY        Review of patient's allergies   -- Niacin preparations    -- Bactrim [sulfamethoxazole-trimethoprim]       Review of Systems and Physical Exam      Review of Systems  -- Constitution - no fever, denies fatigue, no weakness, no chills  -- Eyes - no tearing or redness, no visual disturbance  -- Ear, Nose - no tinnitus or earache, no nasal congestion or discharge  --  Mouth,Throat - no sore throat, no toothache, normal voice, normal swallowing  -- Respiratory - denies cough and congestion, no shortness of breath, no CARPENTER  -- Cardiovascular - denies chest pain, no palpitations, denies claudication  -- Gastrointestinal - denies abdominal pain, nausea, vomiting, or diarrhea  -- Genitourinary - no dysuria, denies flank pain, no hematuria, no STD risk  -- Musculoskeletal - denies back pain, negative for myalgias and arthralgias   -- Neurological - syncope, no headache, denies weakness or seizure  -- Skin - denies pallor, rash, or changes in skin. no hives or welts noted  -- Psychiatric - Denies SI or HI, no psychosis or fractured thought noted     Vital Signs  His blood pressure is 126/74 and his pulse is 54  His respiration is 16 and oxygen saturation is 96%.     Physical Exam  -- Nursing note and vitals reviewed  -- Constitutional: Appears well-developed and well-nourished  -- Head: Atraumatic. Normocephalic. No obvious abnormality  -- Eyes: Pupils are equal and reactive to light. Normal conjunctiva and lids  -- Cardiac: Normal rate, regular rhythm and normal heart sounds  -- Pulmonary: Normal respiratory effort, breath sounds clear to auscultation  -- Abdominal: Soft, no tenderness. Normal bowel sounds. Normal liver edge  -- Musculoskeletal: Normal range of motion, no effusions. Joints stable   -- Neurological: No focal deficits. Showed good interaction with staff  -- Vascular: Posterior tibial, dorsalis pedis and radial pulses 2+ bilaterally      Emergency Room Course      Lab Results     K 3.9      CO2 27   BUN 11   CREATININE 1.2    (H)   ALKPHOS 64   AST 28   ALT 31   BILITOT 0.6   ALBUMIN 3.9   PROT 6.8   WBC 6.61   HGB 13.1 (L)   HCT 38.2 (L)            CPKMB 2.0   TROPONINI 0.008   INR 1.0    (H)   DDIMER 0.43   MG 2.2   TSH 0.577     EKG  -- EKG shows sinus bradycardia without ST changes    Radiology  -- The US of the carotid  artery showed no significant stenosis    Medications Given  0.9%  NaCl infusion (not administered)   0.9%  NaCl infusion (1,000 mLs Intravenous New Bag 10/18/18 1035)     Cardiovascular [facility] ER Consult  -- Patient was seen by [facility] cardiology today in the ER, likely arrhythmia  -- Please see the [facility] notes for full evaluation of the consult in the ER today     Critical Care ED Physician Time (minutes):  -- Performed by: Castro Quintero M.D.  -- Date/Time: 12:07 PM 10/18/2018   -- Direct Patient Care (Face Time): 5  -- Additional History from Records or Taking Additional History: 5  -- Ordering, Reviewing, and Interpreting Diagnostic Studies: 10  -- Total Time in Documentation: 10  -- Consultation with Other Physicians: 10  -- Consultation with Family Related to Condition: 5  -- Total Critical Care Time: 45    Diagnosis  -- Diagnoses of Syncope, Weakness, and Arrhythmia were pertinent to this visit.    Disposition and Plan  -- Disposition: transfer  -- Condition: stable    This note is dictated on Dragon Natural Speaking word recognition program.  There are word recognition mistakes that are occasionally missed on review.         Castro Quintero MD  10/18/18 8417

## 2018-10-18 NOTE — ED NOTES
Pt stated that he was developing the same sensation as when he passed out earlier, witnessed syncopal event with a sinus pause on the monitor for approx 4 seconds.  Pt regained consciousness with no ill effectes of the event.  No chest pain, n/v or sob.  Dr. ford at bedside. Repeat 12 lead ekg done.

## 2018-12-14 LAB
CHOLEST/HDLC SERPL: 3.4 {RATIO}
CHOLESTEROL, TOTAL: 125
HDLC SERPL-MCNC: 37 MG/DL (ref 35–70)
LDL CHOLESTEROL DIRECT: 74 MG/DL
NON HDL CHOL. (LDL+VLDL): 88
TRIGL SERPL-MCNC: 104 MG/DL
VLDL CHOLESTEROL: 21 MG/DL

## 2019-01-29 ENCOUNTER — OFFICE VISIT (OUTPATIENT)
Dept: FAMILY MEDICINE | Facility: CLINIC | Age: 74
End: 2019-01-29
Payer: MEDICARE

## 2019-01-29 VITALS
HEART RATE: 64 BPM | RESPIRATION RATE: 18 BRPM | HEIGHT: 66 IN | DIASTOLIC BLOOD PRESSURE: 72 MMHG | SYSTOLIC BLOOD PRESSURE: 128 MMHG | BODY MASS INDEX: 28.21 KG/M2 | WEIGHT: 175.5 LBS

## 2019-01-29 DIAGNOSIS — R10.31 RIGHT LOWER QUADRANT ABDOMINAL PAIN: Primary | ICD-10-CM

## 2019-01-29 DIAGNOSIS — Z95.0 S/P PLACEMENT OF CARDIAC PACEMAKER: ICD-10-CM

## 2019-01-29 LAB
ALBUMIN SERPL BCP-MCNC: 4 G/DL
ALP SERPL-CCNC: 68 U/L
ALT SERPL W/O P-5'-P-CCNC: 33 U/L
ANION GAP SERPL CALC-SCNC: 7 MMOL/L
AST SERPL-CCNC: 30 U/L
BASOPHILS # BLD AUTO: 0.03 K/UL
BASOPHILS NFR BLD: 0.4 %
BILIRUB SERPL-MCNC: 0.6 MG/DL
BUN SERPL-MCNC: 15 MG/DL
CALCIUM SERPL-MCNC: 9.1 MG/DL
CHLORIDE SERPL-SCNC: 105 MMOL/L
CO2 SERPL-SCNC: 30 MMOL/L
CREAT SERPL-MCNC: 1.3 MG/DL
DIFFERENTIAL METHOD: ABNORMAL
EOSINOPHIL # BLD AUTO: 0.3 K/UL
EOSINOPHIL NFR BLD: 3.9 %
ERYTHROCYTE [DISTWIDTH] IN BLOOD BY AUTOMATED COUNT: 14.5 %
EST. GFR  (AFRICAN AMERICAN): >60 ML/MIN/1.73 M^2
EST. GFR  (NON AFRICAN AMERICAN): 55 ML/MIN/1.73 M^2
GLUCOSE SERPL-MCNC: 97 MG/DL
HCT VFR BLD AUTO: 39.9 %
HGB BLD-MCNC: 13.4 G/DL
LIPASE SERPL-CCNC: 139 U/L
LYMPHOCYTES # BLD AUTO: 2 K/UL
LYMPHOCYTES NFR BLD: 25.7 %
MCH RBC QN AUTO: 34 PG
MCHC RBC AUTO-ENTMCNC: 33.6 G/DL
MCV RBC AUTO: 101 FL
MONOCYTES # BLD AUTO: 0.9 K/UL
MONOCYTES NFR BLD: 11.5 %
NEUTROPHILS # BLD AUTO: 4.6 K/UL
NEUTROPHILS NFR BLD: 58.5 %
PLATELET # BLD AUTO: 251 K/UL
PMV BLD AUTO: 10.7 FL
POTASSIUM SERPL-SCNC: 4.6 MMOL/L
PROT SERPL-MCNC: 6.9 G/DL
RBC # BLD AUTO: 3.94 M/UL
SODIUM SERPL-SCNC: 142 MMOL/L
WBC # BLD AUTO: 7.86 K/UL

## 2019-01-29 PROCEDURE — 36415 COLL VENOUS BLD VENIPUNCTURE: CPT | Mod: HCNC,S$GLB,, | Performed by: FAMILY MEDICINE

## 2019-01-29 PROCEDURE — 99999 PR PBB SHADOW E&M-EST. PATIENT-LVL IV: CPT | Mod: PBBFAC,HCNC,, | Performed by: FAMILY MEDICINE

## 2019-01-29 PROCEDURE — 36415 PR COLLECTION VENOUS BLOOD,VENIPUNCTURE: ICD-10-PCS | Mod: HCNC,S$GLB,, | Performed by: FAMILY MEDICINE

## 2019-01-29 PROCEDURE — 85025 COMPLETE CBC W/AUTO DIFF WBC: CPT | Mod: HCNC

## 2019-01-29 PROCEDURE — 99214 PR OFFICE/OUTPT VISIT, EST, LEVL IV, 30-39 MIN: ICD-10-PCS | Mod: HCNC,S$GLB,, | Performed by: FAMILY MEDICINE

## 2019-01-29 PROCEDURE — 3078F DIAST BP <80 MM HG: CPT | Mod: HCNC,CPTII,S$GLB, | Performed by: FAMILY MEDICINE

## 2019-01-29 PROCEDURE — 83690 ASSAY OF LIPASE: CPT | Mod: HCNC

## 2019-01-29 PROCEDURE — 3074F PR MOST RECENT SYSTOLIC BLOOD PRESSURE < 130 MM HG: ICD-10-PCS | Mod: HCNC,CPTII,S$GLB, | Performed by: FAMILY MEDICINE

## 2019-01-29 PROCEDURE — 80053 COMPREHEN METABOLIC PANEL: CPT | Mod: HCNC

## 2019-01-29 PROCEDURE — 3078F PR MOST RECENT DIASTOLIC BLOOD PRESSURE < 80 MM HG: ICD-10-PCS | Mod: HCNC,CPTII,S$GLB, | Performed by: FAMILY MEDICINE

## 2019-01-29 PROCEDURE — 1101F PT FALLS ASSESS-DOCD LE1/YR: CPT | Mod: HCNC,CPTII,S$GLB, | Performed by: FAMILY MEDICINE

## 2019-01-29 PROCEDURE — 99214 OFFICE O/P EST MOD 30 MIN: CPT | Mod: HCNC,S$GLB,, | Performed by: FAMILY MEDICINE

## 2019-01-29 PROCEDURE — 3074F SYST BP LT 130 MM HG: CPT | Mod: HCNC,CPTII,S$GLB, | Performed by: FAMILY MEDICINE

## 2019-01-29 PROCEDURE — 99999 PR PBB SHADOW E&M-EST. PATIENT-LVL IV: ICD-10-PCS | Mod: PBBFAC,HCNC,, | Performed by: FAMILY MEDICINE

## 2019-01-29 PROCEDURE — 1101F PR PT FALLS ASSESS DOC 0-1 FALLS W/OUT INJ PAST YR: ICD-10-PCS | Mod: HCNC,CPTII,S$GLB, | Performed by: FAMILY MEDICINE

## 2019-01-29 RX ORDER — METRONIDAZOLE 500 MG/1
500 TABLET ORAL 3 TIMES DAILY
Qty: 30 TABLET | Refills: 0 | Status: SHIPPED | OUTPATIENT
Start: 2019-01-29 | End: 2019-05-19

## 2019-01-29 RX ORDER — CIPROFLOXACIN 500 MG/1
500 TABLET ORAL 2 TIMES DAILY
Qty: 20 TABLET | Refills: 0 | Status: SHIPPED | OUTPATIENT
Start: 2019-01-29 | End: 2019-02-08

## 2019-01-29 NOTE — PROGRESS NOTES
Subjective:       Patient ID: James Quan Jr. is a 73 y.o. male.    Chief Complaint: Groin Pain and Abdominal Pain (R side )    Pt is a 73 y.o. male who presents for evaluation and management of   Encounter Diagnoses   Name Primary?    S/P placement of cardiac pacemaker     Right lower quadrant abdominal pain Yes     Doing well on current meds. Denies any side effects. Prevention is up to date.    Review of Systems   Constitutional: Negative for fever.   Gastrointestinal: Positive for abdominal pain. Negative for blood in stool, diarrhea, nausea and vomiting.        Cramping        Objective:      Physical Exam   Constitutional: He is oriented to person, place, and time. He appears well-developed and well-nourished.   HENT:   Head: Normocephalic and atraumatic.   Right Ear: External ear normal.   Left Ear: External ear normal.   Nose: Nose normal.   Mouth/Throat: Oropharynx is clear and moist.   Eyes: Conjunctivae and EOM are normal. Pupils are equal, round, and reactive to light. Right eye exhibits no discharge. Left eye exhibits no discharge. No scleral icterus.   Neck: Normal range of motion. Neck supple. No JVD present. No tracheal deviation present. No thyromegaly present.   Cardiovascular: Normal rate, regular rhythm, normal heart sounds and intact distal pulses.   No murmur heard.  Pulmonary/Chest: Effort normal and breath sounds normal. No respiratory distress. He has no wheezes. He has no rales. He exhibits no tenderness.   Abdominal: Soft. Bowel sounds are normal. He exhibits no distension and no mass. There is tenderness. There is no rebound and no guarding.   RLQ TTP      Musculoskeletal: Normal range of motion.   Lymphadenopathy:     He has no cervical adenopathy.   Neurological: He is alert and oriented to person, place, and time. He has normal reflexes. He displays normal reflexes. No cranial nerve deficit. He exhibits normal muscle tone. Coordination normal.   Skin: Skin is warm and  dry.   Psychiatric: He has a normal mood and affect. His behavior is normal. Judgment and thought content normal.       Assessment:       1. Right lower quadrant abdominal pain    2. S/P placement of cardiac pacemaker        Plan:   James was seen today for groin pain and abdominal pain.    Diagnoses and all orders for this visit:    Right lower quadrant abdominal pain  -     CBC auto differential; Future  -     Comprehensive metabolic panel; Future  -     Lipase; Future  -     ciprofloxacin HCl (CIPRO) 500 MG tablet; Take 1 tablet (500 mg total) by mouth 2 (two) times daily. for 10 days  -     metroNIDAZOLE (FLAGYL) 500 MG tablet; Take 1 tablet (500 mg total) by mouth 3 (three) times daily.    S/P placement of cardiac pacemaker      Problem List Items Addressed This Visit     S/P placement of cardiac pacemaker      Other Visit Diagnoses     Right lower quadrant abdominal pain    -  Primary    Relevant Medications    ciprofloxacin HCl (CIPRO) 500 MG tablet    metroNIDAZOLE (FLAGYL) 500 MG tablet    Other Relevant Orders    CBC auto differential    Comprehensive metabolic panel    Lipase        No Follow-up on file.

## 2019-01-31 ENCOUNTER — HOSPITAL ENCOUNTER (OUTPATIENT)
Dept: RADIOLOGY | Facility: HOSPITAL | Age: 74
Discharge: HOME | End: 2019-01-31
Attending: FAMILY MEDICINE
Payer: MEDICARE

## 2019-01-31 ENCOUNTER — TELEPHONE (OUTPATIENT)
Dept: FAMILY MEDICINE | Facility: CLINIC | Age: 74
End: 2019-01-31

## 2019-01-31 DIAGNOSIS — R10.9 ABDOMINAL PAIN, UNSPECIFIED ABDOMINAL LOCATION: Primary | ICD-10-CM

## 2019-01-31 DIAGNOSIS — R10.9 ABDOMINAL PAIN, UNSPECIFIED ABDOMINAL LOCATION: ICD-10-CM

## 2019-01-31 PROCEDURE — 25500020 PHARM REV CODE 255: Mod: HCNC | Performed by: FAMILY MEDICINE

## 2019-01-31 PROCEDURE — 74177 CT ABD & PELVIS W/CONTRAST: CPT | Mod: TC,HCNC

## 2019-01-31 RX ADMIN — IOHEXOL 75 ML: 350 INJECTION, SOLUTION INTRAVENOUS at 04:01

## 2019-01-31 RX ADMIN — IOHEXOL 30 ML: 350 INJECTION, SOLUTION INTRAVENOUS at 04:01

## 2019-01-31 NOTE — TELEPHONE ENCOUNTER
----- Message from Alex Mcallister MD sent at 1/31/2019  9:10 AM CST -----  Labs look good, except that his lipase is a litle elevated. This comes from the pancreas mostly, so I would like to do a CT scan today to make sure no pancreatitis. Please call Mr. Rosado and schedule today thanks !

## 2019-01-31 NOTE — TELEPHONE ENCOUNTER
Patient notified, will go to [place] to obtain STAT CT as soon as his relief from work shows up. States he should be at [facility] for about 3:30pm. Notified radiology staff.

## 2019-01-31 NOTE — PROGRESS NOTES
Labs look good, except that his lipase is a litle elevated. This comes from the pancreas mostly, so I would like to do a CT scan today to make sure no pancreatitis. Please call Mr. Mallorynie and schedule today thanks !

## 2019-02-04 ENCOUNTER — TELEPHONE (OUTPATIENT)
Dept: FAMILY MEDICINE | Facility: CLINIC | Age: 74
End: 2019-02-04

## 2019-02-04 ENCOUNTER — OFFICE VISIT (OUTPATIENT)
Dept: FAMILY MEDICINE | Facility: CLINIC | Age: 74
End: 2019-02-04
Payer: MEDICARE

## 2019-02-04 VITALS
SYSTOLIC BLOOD PRESSURE: 116 MMHG | RESPIRATION RATE: 16 BRPM | BODY MASS INDEX: 28.76 KG/M2 | DIASTOLIC BLOOD PRESSURE: 64 MMHG | WEIGHT: 178.19 LBS | HEART RATE: 68 BPM

## 2019-02-04 DIAGNOSIS — K58.0 IRRITABLE BOWEL SYNDROME WITH DIARRHEA: ICD-10-CM

## 2019-02-04 DIAGNOSIS — R10.31 RIGHT LOWER QUADRANT ABDOMINAL PAIN: Primary | ICD-10-CM

## 2019-02-04 DIAGNOSIS — R14.1 GAS PAIN: ICD-10-CM

## 2019-02-04 DIAGNOSIS — Z23 IMMUNIZATION DUE: ICD-10-CM

## 2019-02-04 PROCEDURE — 99213 PR OFFICE/OUTPT VISIT, EST, LEVL III, 20-29 MIN: ICD-10-PCS | Mod: 25,HCNC,S$GLB, | Performed by: FAMILY MEDICINE

## 2019-02-04 PROCEDURE — G0009 ADMIN PNEUMOCOCCAL VACCINE: HCPCS | Mod: 59,HCNC,S$GLB, | Performed by: FAMILY MEDICINE

## 2019-02-04 PROCEDURE — 90732 PNEUMOCOCCAL POLYSACCHARIDE VACCINE 23-VALENT =>2YO SQ IM: ICD-10-PCS | Mod: HCNC,S$GLB,, | Performed by: FAMILY MEDICINE

## 2019-02-04 PROCEDURE — 3074F PR MOST RECENT SYSTOLIC BLOOD PRESSURE < 130 MM HG: ICD-10-PCS | Mod: HCNC,CPTII,S$GLB, | Performed by: FAMILY MEDICINE

## 2019-02-04 PROCEDURE — G0009 PNEUMOCOCCAL POLYSACCHARIDE VACCINE 23-VALENT =>2YO SQ IM: ICD-10-PCS | Mod: 59,HCNC,S$GLB, | Performed by: FAMILY MEDICINE

## 2019-02-04 PROCEDURE — 1101F PT FALLS ASSESS-DOCD LE1/YR: CPT | Mod: HCNC,CPTII,S$GLB, | Performed by: FAMILY MEDICINE

## 2019-02-04 PROCEDURE — 3074F SYST BP LT 130 MM HG: CPT | Mod: HCNC,CPTII,S$GLB, | Performed by: FAMILY MEDICINE

## 2019-02-04 PROCEDURE — 90732 PPSV23 VACC 2 YRS+ SUBQ/IM: CPT | Mod: HCNC,S$GLB,, | Performed by: FAMILY MEDICINE

## 2019-02-04 PROCEDURE — 3078F DIAST BP <80 MM HG: CPT | Mod: HCNC,CPTII,S$GLB, | Performed by: FAMILY MEDICINE

## 2019-02-04 PROCEDURE — 3078F PR MOST RECENT DIASTOLIC BLOOD PRESSURE < 80 MM HG: ICD-10-PCS | Mod: HCNC,CPTII,S$GLB, | Performed by: FAMILY MEDICINE

## 2019-02-04 PROCEDURE — 99213 OFFICE O/P EST LOW 20 MIN: CPT | Mod: 25,HCNC,S$GLB, | Performed by: FAMILY MEDICINE

## 2019-02-04 PROCEDURE — 99999 PR PBB SHADOW E&M-EST. PATIENT-LVL III: ICD-10-PCS | Mod: PBBFAC,HCNC,, | Performed by: FAMILY MEDICINE

## 2019-02-04 PROCEDURE — 1101F PR PT FALLS ASSESS DOC 0-1 FALLS W/OUT INJ PAST YR: ICD-10-PCS | Mod: HCNC,CPTII,S$GLB, | Performed by: FAMILY MEDICINE

## 2019-02-04 PROCEDURE — 99999 PR PBB SHADOW E&M-EST. PATIENT-LVL III: CPT | Mod: PBBFAC,HCNC,, | Performed by: FAMILY MEDICINE

## 2019-02-04 NOTE — TELEPHONE ENCOUNTER
PA for Xifaxan  submitted to insurance company via [employer] web site. Key: [id number]  Awaiting insurance company response/ decision.

## 2019-02-04 NOTE — TELEPHONE ENCOUNTER
Received determination of approval for Xifaxan 550 mg thru 05/05/2019 .Pharmacy notified and will notify patient.

## 2019-02-04 NOTE — PROGRESS NOTES
Subjective:       Patient ID: James Quan Jr. is a 73 y.o. male.    Chief Complaint: Follow-up (1 week follow up/CAT Scan)    Pt is a 73 y.o. male who presents for evaluation and management of   Encounter Diagnoses   Name Primary?    Right lower quadrant abdominal pain Yes    Gas pain     Irritable bowel syndrome with diarrhea    .pain not much better with cipro and flagyl  Feels like gas pain     Doing well on current meds. Denies any side effects. Prevention is up to date.    Review of Systems   Constitutional: Negative for activity change and fever.   Gastrointestinal: Positive for abdominal pain.       Objective:      Physical Exam   Constitutional: He is oriented to person, place, and time. He appears well-developed and well-nourished.   HENT:   Head: Normocephalic and atraumatic.   Right Ear: External ear normal.   Left Ear: External ear normal.   Nose: Nose normal.   Mouth/Throat: Oropharynx is clear and moist.   Eyes: Conjunctivae and EOM are normal. Pupils are equal, round, and reactive to light. Right eye exhibits no discharge. Left eye exhibits no discharge. No scleral icterus.   Neck: Normal range of motion. Neck supple. No JVD present. No tracheal deviation present. No thyromegaly present.   Cardiovascular: Normal rate, regular rhythm, normal heart sounds and intact distal pulses.   No murmur heard.  Pulmonary/Chest: Effort normal and breath sounds normal. No respiratory distress. He has no wheezes. He has no rales. He exhibits no tenderness.   Abdominal: Soft. Bowel sounds are normal. He exhibits no distension and no mass. There is no tenderness. There is no rebound and no guarding.   No inguinal hernia bilaterally   Musculoskeletal: Normal range of motion.   Lymphadenopathy:     He has no cervical adenopathy.   Neurological: He is alert and oriented to person, place, and time. He has normal reflexes. He displays normal reflexes. No cranial nerve deficit. He exhibits normal muscle tone.  Coordination normal.   Skin: Skin is warm and dry.   Psychiatric: He has a normal mood and affect. His behavior is normal. Judgment and thought content normal.       Assessment:       1. Right lower quadrant abdominal pain    2. Gas pain    3. Irritable bowel syndrome with diarrhea        Plan:   James was seen today for follow-up.    Diagnoses and all orders for this visit:    Right lower quadrant abdominal pain  -     rifAXIMin (XIFAXAN) 550 mg Tab; Take 1 tablet (550 mg total) by mouth 2 (two) times daily.    Gas pain  -     rifAXIMin (XIFAXAN) 550 mg Tab; Take 1 tablet (550 mg total) by mouth 2 (two) times daily.    Irritable bowel syndrome with diarrhea  -     rifAXIMin (XIFAXAN) 550 mg Tab; Take 1 tablet (550 mg total) by mouth 2 (two) times daily.      Problem List Items Addressed This Visit     None      Visit Diagnoses     Right lower quadrant abdominal pain    -  Primary    Relevant Medications    rifAXIMin (XIFAXAN) 550 mg Tab    Gas pain        Relevant Medications    rifAXIMin (XIFAXAN) 550 mg Tab    Irritable bowel syndrome with diarrhea        Relevant Medications    rifAXIMin (XIFAXAN) 550 mg Tab      CT WNL   Will stop cipro flagyl and start above   RTC 2 weeks   RTC if condition acutely worsens or any other concerns, otherwise RTC as scheduled      No Follow-up on file.

## 2019-02-04 NOTE — TELEPHONE ENCOUNTER
Spoke with pharmacy state medication is $536 spoke with patient spouse they would like alternative sent spouse with insurance state there are no alternatives .Thank you

## 2019-02-04 NOTE — TELEPHONE ENCOUNTER
----- Message from Mau Seo sent at 2019  3:25 PM CST -----  Contact: Wife - Elsa Quan Jr.  MRN: [id number]  : 1945  PCP: Alex Mcallister  Home Phone      [phone]  Work Phone      Not on file.  Mobile          [phone]      MESSAGE: even with insurance Xifaxin was still $500 -- can something else be sent in    Call Elsa @ [phone]    PCP: Ary

## 2019-02-04 NOTE — TELEPHONE ENCOUNTER
Ok. I have enough samples for 12 days. Have him come get them. Its TID for 12 days. On my desk thanks

## 2019-02-18 ENCOUNTER — OFFICE VISIT (OUTPATIENT)
Dept: FAMILY MEDICINE | Facility: CLINIC | Age: 74
End: 2019-02-18
Payer: MEDICARE

## 2019-02-18 VITALS
HEART RATE: 64 BPM | WEIGHT: 173.5 LBS | RESPIRATION RATE: 16 BRPM | BODY MASS INDEX: 27.88 KG/M2 | SYSTOLIC BLOOD PRESSURE: 118 MMHG | DIASTOLIC BLOOD PRESSURE: 68 MMHG | HEIGHT: 66 IN

## 2019-02-18 DIAGNOSIS — R10.84 GENERALIZED ABDOMINAL PAIN: Primary | ICD-10-CM

## 2019-02-18 PROCEDURE — 99999 PR PBB SHADOW E&M-EST. PATIENT-LVL IV: ICD-10-PCS | Mod: PBBFAC,HCNC,, | Performed by: FAMILY MEDICINE

## 2019-02-18 PROCEDURE — 99213 OFFICE O/P EST LOW 20 MIN: CPT | Mod: HCNC,S$GLB,, | Performed by: FAMILY MEDICINE

## 2019-02-18 PROCEDURE — 3074F SYST BP LT 130 MM HG: CPT | Mod: HCNC,CPTII,S$GLB, | Performed by: FAMILY MEDICINE

## 2019-02-18 PROCEDURE — 3074F PR MOST RECENT SYSTOLIC BLOOD PRESSURE < 130 MM HG: ICD-10-PCS | Mod: HCNC,CPTII,S$GLB, | Performed by: FAMILY MEDICINE

## 2019-02-18 PROCEDURE — 3078F PR MOST RECENT DIASTOLIC BLOOD PRESSURE < 80 MM HG: ICD-10-PCS | Mod: HCNC,CPTII,S$GLB, | Performed by: FAMILY MEDICINE

## 2019-02-18 PROCEDURE — 1101F PT FALLS ASSESS-DOCD LE1/YR: CPT | Mod: HCNC,CPTII,S$GLB, | Performed by: FAMILY MEDICINE

## 2019-02-18 PROCEDURE — 3078F DIAST BP <80 MM HG: CPT | Mod: HCNC,CPTII,S$GLB, | Performed by: FAMILY MEDICINE

## 2019-02-18 PROCEDURE — 99999 PR PBB SHADOW E&M-EST. PATIENT-LVL IV: CPT | Mod: PBBFAC,HCNC,, | Performed by: FAMILY MEDICINE

## 2019-02-18 PROCEDURE — 99213 PR OFFICE/OUTPT VISIT, EST, LEVL III, 20-29 MIN: ICD-10-PCS | Mod: HCNC,S$GLB,, | Performed by: FAMILY MEDICINE

## 2019-02-18 PROCEDURE — 1101F PR PT FALLS ASSESS DOC 0-1 FALLS W/OUT INJ PAST YR: ICD-10-PCS | Mod: HCNC,CPTII,S$GLB, | Performed by: FAMILY MEDICINE

## 2019-02-18 NOTE — PROGRESS NOTES
Subjective:       Patient ID: James Quan Jr. is a 73 y.o. male.    Chief Complaint: Follow-up (IBS follow up )    Pt is a 73 y.o. male who presents for evaluation and management of   Encounter Diagnosis   Name Primary?    Generalized abdominal pain Yes   .  Doing well on current meds. Denies any side effects. Prevention is up to date.  Review of Systems   Constitutional: Negative for activity change.   Gastrointestinal: Negative for abdominal pain.        Abd pain much better   '       Objective:      Physical Exam   Constitutional: He is oriented to person, place, and time. He appears well-developed and well-nourished.   HENT:   Head: Normocephalic and atraumatic.   Right Ear: External ear normal.   Left Ear: External ear normal.   Nose: Nose normal.   Mouth/Throat: Oropharynx is clear and moist.   Eyes: Conjunctivae and EOM are normal. Pupils are equal, round, and reactive to light. Right eye exhibits no discharge. Left eye exhibits no discharge. No scleral icterus.   Neck: Normal range of motion. Neck supple. No JVD present. No tracheal deviation present. No thyromegaly present.   Cardiovascular: Normal rate, regular rhythm, normal heart sounds and intact distal pulses.   No murmur heard.  Pulmonary/Chest: Effort normal and breath sounds normal. No respiratory distress. He has no wheezes. He has no rales. He exhibits no tenderness.   Abdominal: Soft. Bowel sounds are normal. He exhibits no distension and no mass. There is no tenderness. There is no rebound and no guarding.   Musculoskeletal: Normal range of motion.   Lymphadenopathy:     He has no cervical adenopathy.   Neurological: He is alert and oriented to person, place, and time. He has normal reflexes. He displays normal reflexes. No cranial nerve deficit. He exhibits normal muscle tone. Coordination normal.   Skin: Skin is warm and dry.   Psychiatric: He has a normal mood and affect. His behavior is normal. Judgment and thought content  normal.       Assessment:       1. Generalized abdominal pain        Plan:   James was seen today for follow-up.    Diagnoses and all orders for this visit:    Generalized abdominal pain      Problem List Items Addressed This Visit     None      Visit Diagnoses     Generalized abdominal pain    -  Primary      resolved without Xifaxan   Using align which seems to help   Increase fiber. Try and avoid sees and nuts   RTC if condition acutely worsens or any other concerns, otherwise RTC as scheduled    No Follow-up on file.

## 2019-03-28 ENCOUNTER — TELEPHONE (OUTPATIENT)
Dept: ADMINISTRATIVE | Facility: HOSPITAL | Age: 74
End: 2019-03-28

## 2019-04-23 ENCOUNTER — TELEPHONE (OUTPATIENT)
Dept: FAMILY MEDICINE | Facility: CLINIC | Age: 74
End: 2019-04-23

## 2019-04-23 NOTE — TELEPHONE ENCOUNTER
Received a PA request for Xifaxan 550mg.  Medication was D/c. Attempted to contact to to discuss if he takes this medication or not.  No answer, was not able to leave message

## 2019-05-03 DIAGNOSIS — E03.9 HYPOTHYROIDISM, UNSPECIFIED TYPE: ICD-10-CM

## 2019-05-05 RX ORDER — LEVOTHYROXINE SODIUM 100 UG/1
TABLET ORAL
Qty: 90 TABLET | Refills: 3 | Status: SHIPPED | OUTPATIENT
Start: 2019-05-05 | End: 2020-05-07 | Stop reason: SDUPTHER

## 2019-05-19 ENCOUNTER — HOSPITAL ENCOUNTER (INPATIENT)
Facility: HOSPITAL | Age: 74
LOS: 4 days | Discharge: HOME | DRG: 690 | End: 2019-05-23
Attending: INTERNAL MEDICINE | Admitting: FAMILY MEDICINE
Payer: MEDICARE

## 2019-05-19 ENCOUNTER — HOSPITAL ENCOUNTER (EMERGENCY)
Facility: HOSPITAL | Age: 74
Discharge: HOME | End: 2019-05-19
Attending: INTERNAL MEDICINE
Payer: MEDICARE

## 2019-05-19 VITALS
WEIGHT: 170.75 LBS | SYSTOLIC BLOOD PRESSURE: 120 MMHG | OXYGEN SATURATION: 97 % | HEIGHT: 66 IN | RESPIRATION RATE: 16 BRPM | TEMPERATURE: 97 F | DIASTOLIC BLOOD PRESSURE: 63 MMHG | HEART RATE: 63 BPM | BODY MASS INDEX: 27.44 KG/M2

## 2019-05-19 DIAGNOSIS — R78.81 BACTEREMIA DUE TO GRAM-NEGATIVE BACTERIA: Primary | ICD-10-CM

## 2019-05-19 DIAGNOSIS — N39.0 URINARY TRACT INFECTION WITHOUT HEMATURIA, SITE UNSPECIFIED: ICD-10-CM

## 2019-05-19 DIAGNOSIS — N39.0 URINARY TRACT INFECTION WITH HEMATURIA, SITE UNSPECIFIED: Primary | ICD-10-CM

## 2019-05-19 DIAGNOSIS — C92.10 CML (CHRONIC MYELOCYTIC LEUKEMIA): ICD-10-CM

## 2019-05-19 DIAGNOSIS — R31.9 URINARY TRACT INFECTION WITH HEMATURIA, SITE UNSPECIFIED: Primary | ICD-10-CM

## 2019-05-19 LAB
ALBUMIN SERPL BCP-MCNC: 3.5 G/DL (ref 3.5–5.2)
ALP SERPL-CCNC: 69 U/L (ref 55–135)
ALT SERPL W/O P-5'-P-CCNC: 19 U/L (ref 10–44)
ANION GAP SERPL CALC-SCNC: 10 MMOL/L (ref 8–16)
AST SERPL-CCNC: 22 U/L (ref 10–40)
BACTERIA #/AREA URNS HPF: ABNORMAL /HPF
BASOPHILS # BLD AUTO: 0.01 K/UL (ref 0–0.2)
BASOPHILS NFR BLD: 0.1 % (ref 0–1.9)
BILIRUB SERPL-MCNC: 0.7 MG/DL (ref 0.1–1)
BILIRUB UR QL STRIP: ABNORMAL
BUN SERPL-MCNC: 20 MG/DL (ref 8–23)
CALCIUM SERPL-MCNC: 9.1 MG/DL (ref 8.7–10.5)
CHLORIDE SERPL-SCNC: 107 MMOL/L (ref 95–110)
CLARITY UR: CLEAR
CO2 SERPL-SCNC: 25 MMOL/L (ref 23–29)
COLOR UR: YELLOW
CREAT SERPL-MCNC: 1.4 MG/DL (ref 0.5–1.4)
D DIMER PPP IA.FEU-MCNC: 1.52 MG/L FEU
DIFFERENTIAL METHOD: ABNORMAL
EOSINOPHIL # BLD AUTO: 0 K/UL (ref 0–0.5)
EOSINOPHIL NFR BLD: 0.3 % (ref 0–8)
ERYTHROCYTE [DISTWIDTH] IN BLOOD BY AUTOMATED COUNT: 13.9 % (ref 11.5–14.5)
EST. GFR  (AFRICAN AMERICAN): 58 ML/MIN/1.73 M^2
EST. GFR  (NON AFRICAN AMERICAN): 50 ML/MIN/1.73 M^2
GLUCOSE SERPL-MCNC: 122 MG/DL (ref 70–110)
GLUCOSE UR QL STRIP: NEGATIVE
HCT VFR BLD AUTO: 35.1 % (ref 40–54)
HGB BLD-MCNC: 12 G/DL (ref 14–18)
HGB UR QL STRIP: ABNORMAL
HYALINE CASTS #/AREA URNS LPF: 0 /LPF
KETONES UR QL STRIP: ABNORMAL
LACTATE SERPL-SCNC: 2 MMOL/L (ref 0.5–2.2)
LEUKOCYTE ESTERASE UR QL STRIP: ABNORMAL
LYMPHOCYTES # BLD AUTO: 0.8 K/UL (ref 1–4.8)
LYMPHOCYTES NFR BLD: 11.1 % (ref 18–48)
MCH RBC QN AUTO: 34.2 PG (ref 27–31)
MCHC RBC AUTO-ENTMCNC: 34.2 G/DL (ref 32–36)
MCV RBC AUTO: 100 FL (ref 82–98)
MICROSCOPIC COMMENT: ABNORMAL
MONOCYTES # BLD AUTO: 0.2 K/UL (ref 0.3–1)
MONOCYTES NFR BLD: 2.5 % (ref 4–15)
NEUTROPHILS # BLD AUTO: 5.9 K/UL (ref 1.8–7.7)
NEUTROPHILS NFR BLD: 86 % (ref 38–73)
NITRITE UR QL STRIP: NEGATIVE
PH UR STRIP: 5 [PH] (ref 5–8)
PLATELET # BLD AUTO: 243 K/UL (ref 150–350)
PMV BLD AUTO: 10.1 FL (ref 9.2–12.9)
POTASSIUM SERPL-SCNC: 3.9 MMOL/L (ref 3.5–5.1)
PROT SERPL-MCNC: 6.8 G/DL (ref 6–8.4)
PROT UR QL STRIP: ABNORMAL
RBC # BLD AUTO: 3.51 M/UL (ref 4.6–6.2)
RBC #/AREA URNS HPF: 2 /HPF (ref 0–4)
SODIUM SERPL-SCNC: 142 MMOL/L (ref 136–145)
SP GR UR STRIP: 1.02 (ref 1–1.03)
TROPONIN I SERPL DL<=0.01 NG/ML-MCNC: 0.02 NG/ML (ref 0–0.03)
URN SPEC COLLECT METH UR: ABNORMAL
UROBILINOGEN UR STRIP-ACNC: NEGATIVE EU/DL
WBC # BLD AUTO: 6.83 K/UL (ref 3.9–12.7)
WBC #/AREA URNS HPF: >100 /HPF (ref 0–5)
WBC CLUMPS URNS QL MICRO: ABNORMAL

## 2019-05-19 PROCEDURE — 11000001 HC ACUTE MED/SURG PRIVATE ROOM: Mod: HCNC

## 2019-05-19 PROCEDURE — 85025 COMPLETE CBC W/AUTO DIFF WBC: CPT | Mod: HCNC

## 2019-05-19 PROCEDURE — 36415 COLL VENOUS BLD VENIPUNCTURE: CPT | Mod: HCNC

## 2019-05-19 PROCEDURE — 80053 COMPREHEN METABOLIC PANEL: CPT | Mod: HCNC

## 2019-05-19 PROCEDURE — 80053 COMPREHEN METABOLIC PANEL: CPT | Mod: 91,HCNC

## 2019-05-19 PROCEDURE — 99285 EMERGENCY DEPT VISIT HI MDM: CPT | Mod: 25,HCNC

## 2019-05-19 PROCEDURE — 87186 SC STD MICRODIL/AGAR DIL: CPT | Mod: HCNC

## 2019-05-19 PROCEDURE — 87086 URINE CULTURE/COLONY COUNT: CPT | Mod: HCNC

## 2019-05-19 PROCEDURE — 99285 EMERGENCY DEPT VISIT HI MDM: CPT | Mod: 25,27,HCNC

## 2019-05-19 PROCEDURE — 63600175 PHARM REV CODE 636 W HCPCS: Mod: HCNC | Performed by: INTERNAL MEDICINE

## 2019-05-19 PROCEDURE — 85379 FIBRIN DEGRADATION QUANT: CPT | Mod: HCNC

## 2019-05-19 PROCEDURE — 96361 HYDRATE IV INFUSION ADD-ON: CPT | Mod: HCNC

## 2019-05-19 PROCEDURE — 83605 ASSAY OF LACTIC ACID: CPT | Mod: HCNC

## 2019-05-19 PROCEDURE — 93010 ELECTROCARDIOGRAM REPORT: CPT | Mod: HCNC,,, | Performed by: INTERNAL MEDICINE

## 2019-05-19 PROCEDURE — 93010 EKG 12-LEAD: ICD-10-PCS | Mod: HCNC,,, | Performed by: INTERNAL MEDICINE

## 2019-05-19 PROCEDURE — 85025 COMPLETE CBC W/AUTO DIFF WBC: CPT | Mod: 91,HCNC

## 2019-05-19 PROCEDURE — 25000003 PHARM REV CODE 250: Mod: HCNC | Performed by: INTERNAL MEDICINE

## 2019-05-19 PROCEDURE — 87077 CULTURE AEROBIC IDENTIFY: CPT | Mod: HCNC

## 2019-05-19 PROCEDURE — 81000 URINALYSIS NONAUTO W/SCOPE: CPT | Mod: HCNC

## 2019-05-19 PROCEDURE — 87040 BLOOD CULTURE FOR BACTERIA: CPT | Mod: HCNC

## 2019-05-19 PROCEDURE — 93005 ELECTROCARDIOGRAM TRACING: CPT | Mod: HCNC

## 2019-05-19 PROCEDURE — 96365 THER/PROPH/DIAG IV INF INIT: CPT | Mod: HCNC

## 2019-05-19 PROCEDURE — 84484 ASSAY OF TROPONIN QUANT: CPT | Mod: HCNC

## 2019-05-19 RX ORDER — ACETAMINOPHEN 500 MG
500 TABLET ORAL
Status: COMPLETED | OUTPATIENT
Start: 2019-05-19 | End: 2019-05-19

## 2019-05-19 RX ORDER — CIPROFLOXACIN 500 MG/1
500 TABLET ORAL 2 TIMES DAILY
Qty: 14 TABLET | Refills: 0 | Status: ON HOLD | OUTPATIENT
Start: 2019-05-19 | End: 2019-05-23 | Stop reason: HOSPADM

## 2019-05-19 RX ORDER — IBUPROFEN 600 MG/1
600 TABLET ORAL
Status: COMPLETED | OUTPATIENT
Start: 2019-05-19 | End: 2019-05-19

## 2019-05-19 RX ADMIN — IBUPROFEN 600 MG: 600 TABLET ORAL at 05:05

## 2019-05-19 RX ADMIN — SODIUM CHLORIDE 1000 ML: 0.9 INJECTION, SOLUTION INTRAVENOUS at 04:05

## 2019-05-19 RX ADMIN — ACETAMINOPHEN 500 MG: 500 TABLET, FILM COATED ORAL at 04:05

## 2019-05-19 RX ADMIN — CEFTRIAXONE 1 G: 1 INJECTION, SOLUTION INTRAVENOUS at 07:05

## 2019-05-19 NOTE — ED NOTES
Report taken from SHERRY Erazo. Pt resting comfortably on ED stretcher. NADN. AAOx3. Respirations even and unlabored. Bed locked in lowest position. Call bell within reach. Family at bedside. Awaiting MD orders.

## 2019-05-19 NOTE — ED PROVIDER NOTES
"Encounter Date: 5/19/2019       History     Chief Complaint   Patient presents with    Shaking     The patient is a 73 year old man who presents with shaking chills and fever which awakened him from sleep this morning. The patient had been working out in the heat today and experienced some shaking chills and fever when he returned inside tonight. He took a shower and went to bed, only to be awakened a 8 hours later by similar symptoms. The patient states that for the last week he has had a pain in his left groin area which is worse with certain movements. He says that it feels like a tender "knot" at that spot. He denies weakness, dizziness, fatigue, chest pain or dyspnea. He has CAD and h/o CML (in remission for 17 years on Gleevac).        Review of patient's allergies indicates:   Allergen Reactions    Niacin preparations Rash    Bactrim [sulfamethoxazole-trimethoprim] Hives and Itching     Past Medical History:   Diagnosis Date    Anemia     Aplasia bone marrow     Asthma     CA of prostate     CML (chronic myeloid leukemia)     Coronary artery disease     Heart attack 6/17/2014    Heart failure     Hyperlipidemia     Hypertension     Hypothyroidism     Kidney stones     Prostate cancer     Thyroid disease     Urinary incontinence      Past Surgical History:   Procedure Laterality Date    APPENDECTOMY  1966    ATRIAL CARDIAC PACEMAKER INSERTION  10/2018    CHOLECYSTECTOMY  1980    CORONARY ARTERY BYPASS GRAFT  1988    CORONARY STENT PLACEMENT  1990    CYSTOSCOPY      CYSTOSCOPY N/A 12/3/2014    [facility] FLR    CYSTOSCOPY N/A 5/7/2014    [facility] OR    DILATION-URETHRA N/A 12/3/2014    [facility]LR    DILATION-URETHRA N/A 5/7/2014    [facility] OR    heart attack  6/17/2014    INSERTION-CATHETER N/A 5/7/2014    [facility] by [facility] at " [facility] OR    PROSTATE SURGERY      TURP    radiation      for ca prostate    TONSILLECTOMY      As child    TRANSURETHRAL RESECTION OF PROSTATE      x3    TRANSURETHRAL RESECTION OF PROSTATE      urethral dilation      VASECTOMY  1979     Family History   Problem Relation Age of Onset    Diabetes Father      Social History     Tobacco Use    Smoking status: Former Smoker     Packs/day: 1.00     Years: 25.00     Pack years: 25.00     Types: Cigarettes     Last attempt to quit: 1988     Years since quittin.0    Smokeless tobacco: Never Used   Substance Use Topics    Alcohol use: No    Drug use: No     Review of Systems   All other systems reviewed and are negative.      Physical Exam     Initial Vitals [19 0349]   BP Pulse Resp Temp SpO2   (!) 169/72 99 20 (!) 102.9 °F (39.4 °C) --      MAP       --         Physical Exam    Nursing note and vitals reviewed.  Constitutional: He appears well-developed and well-nourished. He is not diaphoretic. No distress.   HENT:   Head: Normocephalic and atraumatic.   Nose: Nose normal.   Mouth/Throat: Oropharynx is clear and moist. No oropharyngeal exudate.   Eyes: Conjunctivae and EOM are normal. Pupils are equal, round, and reactive to light. No scleral icterus.   Neck: Normal range of motion. Neck supple. No thyromegaly present. No tracheal deviation present. No JVD present.   Cardiovascular: Normal rate, regular rhythm, normal heart sounds and intact distal pulses. Exam reveals no gallop and no friction rub.    No murmur heard.  Pulmonary/Chest: Breath sounds normal. No stridor. No respiratory distress. He has no wheezes. He has no rhonchi. He has no rales. He exhibits no tenderness.   Abdominal: Soft. Bowel sounds are normal. He exhibits no distension and no mass. There is no tenderness. There is no rebound and no guarding.   Musculoskeletal: Normal range of motion. He exhibits tenderness. He exhibits no edema.   There is tenderness in the left femoral  triangle with an irregularly-shaped subcutaneous fluuness palpated. There is no redness or increased heat noted.   Lymphadenopathy:     He has no cervical adenopathy.   Neurological: He is alert and oriented to person, place, and time. He has normal strength.   Skin: Skin is warm and dry. Capillary refill takes less than 2 seconds.         ED Course   Procedures  Labs Reviewed   CBC W/ AUTO DIFFERENTIAL   COMPREHENSIVE METABOLIC PANEL   D DIMER, QUANTITATIVE   URINALYSIS, REFLEX TO URINE CULTURE          Imaging Results    None          Medical Decision Making:   ED Management:  Ultrasound did not show any DVT in left leg.                      Clinical Impression:       ICD-10-CM ICD-9-CM   1. Urinary tract infection with hematuria, site unspecified N39.0 599.0    R31.9 599.70   2. CML (chronic myelocytic leukemia) C92.10 205.10         Disposition:   Disposition: Discharged  Condition: Stable                        Candelaria Vora MD  05/20/19 0551       Candelaria Vora MD  05/20/19 0551

## 2019-05-20 PROBLEM — N30.00 ACUTE CYSTITIS WITHOUT HEMATURIA: Status: ACTIVE | Noted: 2019-05-20

## 2019-05-20 LAB
ALBUMIN SERPL BCP-MCNC: 2.9 G/DL (ref 3.5–5.2)
ALBUMIN SERPL BCP-MCNC: 3.1 G/DL (ref 3.5–5.2)
ALP SERPL-CCNC: 51 U/L (ref 55–135)
ALP SERPL-CCNC: 58 U/L (ref 55–135)
ALT SERPL W/O P-5'-P-CCNC: 26 U/L (ref 10–44)
ALT SERPL W/O P-5'-P-CCNC: 27 U/L (ref 10–44)
ANION GAP SERPL CALC-SCNC: 5 MMOL/L (ref 8–16)
ANION GAP SERPL CALC-SCNC: 8 MMOL/L (ref 8–16)
AST SERPL-CCNC: 29 U/L (ref 10–40)
AST SERPL-CCNC: 33 U/L (ref 10–40)
BACTERIA UR CULT: NORMAL
BACTERIA UR CULT: NORMAL
BASOPHILS # BLD AUTO: 0.01 K/UL (ref 0–0.2)
BASOPHILS # BLD AUTO: 0.01 K/UL (ref 0–0.2)
BASOPHILS NFR BLD: 0.2 % (ref 0–1.9)
BASOPHILS NFR BLD: 0.2 % (ref 0–1.9)
BILIRUB SERPL-MCNC: 0.4 MG/DL (ref 0.1–1)
BILIRUB SERPL-MCNC: 0.4 MG/DL (ref 0.1–1)
BUN SERPL-MCNC: 15 MG/DL (ref 8–23)
BUN SERPL-MCNC: 19 MG/DL (ref 8–23)
CALCIUM SERPL-MCNC: 8.4 MG/DL (ref 8.7–10.5)
CALCIUM SERPL-MCNC: 8.7 MG/DL (ref 8.7–10.5)
CHLORIDE SERPL-SCNC: 109 MMOL/L (ref 95–110)
CHLORIDE SERPL-SCNC: 109 MMOL/L (ref 95–110)
CO2 SERPL-SCNC: 23 MMOL/L (ref 23–29)
CO2 SERPL-SCNC: 26 MMOL/L (ref 23–29)
CREAT SERPL-MCNC: 1.1 MG/DL (ref 0.5–1.4)
CREAT SERPL-MCNC: 1.3 MG/DL (ref 0.5–1.4)
DIFFERENTIAL METHOD: ABNORMAL
DIFFERENTIAL METHOD: ABNORMAL
EOSINOPHIL # BLD AUTO: 0 K/UL (ref 0–0.5)
EOSINOPHIL # BLD AUTO: 0 K/UL (ref 0–0.5)
EOSINOPHIL NFR BLD: 0 % (ref 0–8)
EOSINOPHIL NFR BLD: 0.2 % (ref 0–8)
ERYTHROCYTE [DISTWIDTH] IN BLOOD BY AUTOMATED COUNT: 14.1 % (ref 11.5–14.5)
ERYTHROCYTE [DISTWIDTH] IN BLOOD BY AUTOMATED COUNT: 14.2 % (ref 11.5–14.5)
EST. GFR  (AFRICAN AMERICAN): >60 ML/MIN/1.73 M^2
EST. GFR  (AFRICAN AMERICAN): >60 ML/MIN/1.73 M^2
EST. GFR  (NON AFRICAN AMERICAN): 55 ML/MIN/1.73 M^2
EST. GFR  (NON AFRICAN AMERICAN): >60 ML/MIN/1.73 M^2
GLUCOSE SERPL-MCNC: 103 MG/DL (ref 70–110)
GLUCOSE SERPL-MCNC: 115 MG/DL (ref 70–110)
HCT VFR BLD AUTO: 30.4 % (ref 40–54)
HCT VFR BLD AUTO: 31.3 % (ref 40–54)
HGB BLD-MCNC: 10.2 G/DL (ref 14–18)
HGB BLD-MCNC: 10.6 G/DL (ref 14–18)
LYMPHOCYTES # BLD AUTO: 0.8 K/UL (ref 1–4.8)
LYMPHOCYTES # BLD AUTO: 0.8 K/UL (ref 1–4.8)
LYMPHOCYTES NFR BLD: 13.1 % (ref 18–48)
LYMPHOCYTES NFR BLD: 13.5 % (ref 18–48)
MCH RBC QN AUTO: 33.7 PG (ref 27–31)
MCH RBC QN AUTO: 34.2 PG (ref 27–31)
MCHC RBC AUTO-ENTMCNC: 33.6 G/DL (ref 32–36)
MCHC RBC AUTO-ENTMCNC: 33.9 G/DL (ref 32–36)
MCV RBC AUTO: 100 FL (ref 82–98)
MCV RBC AUTO: 101 FL (ref 82–98)
MONOCYTES # BLD AUTO: 0.4 K/UL (ref 0.3–1)
MONOCYTES # BLD AUTO: 0.5 K/UL (ref 0.3–1)
MONOCYTES NFR BLD: 6.4 % (ref 4–15)
MONOCYTES NFR BLD: 9 % (ref 4–15)
NEUTROPHILS # BLD AUTO: 4.5 K/UL (ref 1.8–7.7)
NEUTROPHILS # BLD AUTO: 4.9 K/UL (ref 1.8–7.7)
NEUTROPHILS NFR BLD: 77.1 % (ref 38–73)
NEUTROPHILS NFR BLD: 80.3 % (ref 38–73)
PLATELET # BLD AUTO: 219 K/UL (ref 150–350)
PLATELET # BLD AUTO: 220 K/UL (ref 150–350)
PMV BLD AUTO: 9.3 FL (ref 9.2–12.9)
PMV BLD AUTO: 9.8 FL (ref 9.2–12.9)
POTASSIUM SERPL-SCNC: 3.6 MMOL/L (ref 3.5–5.1)
POTASSIUM SERPL-SCNC: 3.6 MMOL/L (ref 3.5–5.1)
PROT SERPL-MCNC: 6 G/DL (ref 6–8.4)
PROT SERPL-MCNC: 6 G/DL (ref 6–8.4)
RBC # BLD AUTO: 3.03 M/UL (ref 4.6–6.2)
RBC # BLD AUTO: 3.1 M/UL (ref 4.6–6.2)
SODIUM SERPL-SCNC: 140 MMOL/L (ref 136–145)
SODIUM SERPL-SCNC: 140 MMOL/L (ref 136–145)
WBC # BLD AUTO: 5.86 K/UL (ref 3.9–12.7)
WBC # BLD AUTO: 6.11 K/UL (ref 3.9–12.7)

## 2019-05-20 PROCEDURE — 99222 PR INITIAL HOSPITAL CARE,LEVL II: ICD-10-PCS | Mod: HCNC,A1,, | Performed by: INTERNAL MEDICINE

## 2019-05-20 PROCEDURE — 25000003 PHARM REV CODE 250: Mod: HCNC | Performed by: NURSE PRACTITIONER

## 2019-05-20 PROCEDURE — 99222 1ST HOSP IP/OBS MODERATE 55: CPT | Mod: HCNC,A1,, | Performed by: INTERNAL MEDICINE

## 2019-05-20 PROCEDURE — 85025 COMPLETE CBC W/AUTO DIFF WBC: CPT | Mod: HCNC

## 2019-05-20 PROCEDURE — 11000001 HC ACUTE MED/SURG PRIVATE ROOM: Mod: HCNC

## 2019-05-20 PROCEDURE — 87040 BLOOD CULTURE FOR BACTERIA: CPT | Mod: HCNC

## 2019-05-20 PROCEDURE — 36415 COLL VENOUS BLD VENIPUNCTURE: CPT | Mod: HCNC

## 2019-05-20 PROCEDURE — 63600175 PHARM REV CODE 636 W HCPCS: Mod: HCNC | Performed by: NURSE PRACTITIONER

## 2019-05-20 PROCEDURE — 63600175 PHARM REV CODE 636 W HCPCS: Mod: HCNC | Performed by: INTERNAL MEDICINE

## 2019-05-20 PROCEDURE — 94761 N-INVAS EAR/PLS OXIMETRY MLT: CPT | Mod: HCNC

## 2019-05-20 PROCEDURE — 80053 COMPREHEN METABOLIC PANEL: CPT | Mod: HCNC

## 2019-05-20 PROCEDURE — 25000003 PHARM REV CODE 250: Mod: HCNC | Performed by: INTERNAL MEDICINE

## 2019-05-20 RX ORDER — ONDANSETRON 2 MG/ML
4 INJECTION INTRAMUSCULAR; INTRAVENOUS EVERY 8 HOURS PRN
Status: DISCONTINUED | OUTPATIENT
Start: 2019-05-20 | End: 2019-05-23 | Stop reason: HOSPADM

## 2019-05-20 RX ORDER — ATORVASTATIN CALCIUM 40 MG/1
40 TABLET, FILM COATED ORAL DAILY
Status: DISCONTINUED | OUTPATIENT
Start: 2019-05-20 | End: 2019-05-23 | Stop reason: HOSPADM

## 2019-05-20 RX ORDER — ASPIRIN 81 MG/1
81 TABLET ORAL DAILY
Status: DISCONTINUED | OUTPATIENT
Start: 2019-05-20 | End: 2019-05-23 | Stop reason: HOSPADM

## 2019-05-20 RX ORDER — LEVOFLOXACIN 5 MG/ML
750 INJECTION, SOLUTION INTRAVENOUS
Status: DISCONTINUED | OUTPATIENT
Start: 2019-05-20 | End: 2019-05-22

## 2019-05-20 RX ORDER — IMATINIB MESYLATE 400 MG/1
400 TABLET, FILM COATED ORAL DAILY
Status: DISCONTINUED | OUTPATIENT
Start: 2019-05-20 | End: 2019-05-23 | Stop reason: HOSPADM

## 2019-05-20 RX ORDER — RANOLAZINE 500 MG/1
500 TABLET, EXTENDED RELEASE ORAL DAILY
Status: DISCONTINUED | OUTPATIENT
Start: 2019-05-20 | End: 2019-05-23 | Stop reason: HOSPADM

## 2019-05-20 RX ORDER — FOLIC ACID 0.4 MG
400 TABLET ORAL DAILY
Status: DISCONTINUED | OUTPATIENT
Start: 2019-05-20 | End: 2019-05-23 | Stop reason: HOSPADM

## 2019-05-20 RX ORDER — CHOLECALCIFEROL (VITAMIN D3) 25 MCG
2000 TABLET ORAL DAILY
Status: DISCONTINUED | OUTPATIENT
Start: 2019-05-20 | End: 2019-05-20

## 2019-05-20 RX ORDER — SODIUM CHLORIDE 0.9 % (FLUSH) 0.9 %
10 SYRINGE (ML) INJECTION
Status: DISCONTINUED | OUTPATIENT
Start: 2019-05-20 | End: 2019-05-23 | Stop reason: HOSPADM

## 2019-05-20 RX ORDER — LEVOTHYROXINE SODIUM 100 UG/1
100 TABLET ORAL
Status: DISCONTINUED | OUTPATIENT
Start: 2019-05-20 | End: 2019-05-23 | Stop reason: HOSPADM

## 2019-05-20 RX ORDER — ACETAMINOPHEN 325 MG/1
650 TABLET ORAL EVERY 8 HOURS PRN
Status: DISCONTINUED | OUTPATIENT
Start: 2019-05-20 | End: 2019-05-23 | Stop reason: HOSPADM

## 2019-05-20 RX ORDER — LEVOFLOXACIN 5 MG/ML
750 INJECTION, SOLUTION INTRAVENOUS
Status: DISCONTINUED | OUTPATIENT
Start: 2019-05-20 | End: 2019-05-20

## 2019-05-20 RX ORDER — NITROGLYCERIN 0.4 MG/1
0.4 TABLET SUBLINGUAL EVERY 5 MIN PRN
Status: DISCONTINUED | OUTPATIENT
Start: 2019-05-20 | End: 2019-05-23 | Stop reason: HOSPADM

## 2019-05-20 RX ORDER — LOSARTAN POTASSIUM 50 MG/1
50 TABLET ORAL DAILY
Status: DISCONTINUED | OUTPATIENT
Start: 2019-05-20 | End: 2019-05-22

## 2019-05-20 RX ORDER — ISOSORBIDE MONONITRATE 30 MG/1
30 TABLET, EXTENDED RELEASE ORAL DAILY
Status: DISCONTINUED | OUTPATIENT
Start: 2019-05-20 | End: 2019-05-23 | Stop reason: HOSPADM

## 2019-05-20 RX ADMIN — IMATINIB MESYLATE 400 MG: 400 TABLET, FILM COATED ORAL at 12:05

## 2019-05-20 RX ADMIN — FOLIC ACID TAB 400 MCG 400 MCG: 400 TAB at 09:05

## 2019-05-20 RX ADMIN — LEVOFLOXACIN 750 MG: 750 INJECTION, SOLUTION INTRAVENOUS at 01:05

## 2019-05-20 RX ADMIN — LEVOTHYROXINE SODIUM 100 MCG: 100 TABLET ORAL at 06:05

## 2019-05-20 RX ADMIN — ACETAMINOPHEN 650 MG: 325 TABLET ORAL at 09:05

## 2019-05-20 RX ADMIN — RANOLAZINE 500 MG: 500 TABLET, FILM COATED, EXTENDED RELEASE ORAL at 09:05

## 2019-05-20 RX ADMIN — ACETAMINOPHEN 650 MG: 325 TABLET ORAL at 05:05

## 2019-05-20 RX ADMIN — ASPIRIN 81 MG: 81 TABLET, COATED ORAL at 10:05

## 2019-05-20 RX ADMIN — ISOSORBIDE MONONITRATE 30 MG: 30 TABLET, EXTENDED RELEASE ORAL at 09:05

## 2019-05-20 RX ADMIN — LOSARTAN POTASSIUM 50 MG: 50 TABLET, FILM COATED ORAL at 09:05

## 2019-05-20 RX ADMIN — LEVOFLOXACIN 750 MG: 750 INJECTION, SOLUTION INTRAVENOUS at 09:05

## 2019-05-20 NOTE — PLAN OF CARE
Problem: Adult Inpatient Plan of Care  Goal: Plan of Care Review  Outcome: Ongoing (interventions implemented as appropriate)  Patient is presenting with a fever of 100.8 F. 650 mg of tylenol given. Will recheck temperature. Patient ambulating to toilet. Wife is present at bedside. Will continue to monitor. Plan of care reviewed with patient and spouse. Patient and spouse verbalized understanding.

## 2019-05-20 NOTE — PLAN OF CARE
05/20/19 1334   Medicare Message   Important Message from Medicare regarding Discharge Appeal Rights Given to patient/caregiver;Explained to patient/caregiver;Signed/date by patient/caregiver   Date IMM was signed 05/17/19   Time IMM was signed [id number]

## 2019-05-20 NOTE — PLAN OF CARE
05/20/19 1050   Discharge Assessment   Assessment Type Discharge Planning Assessment   Confirmed/corrected address and phone number on facesheet? Yes   Assessment information obtained from? Patient;Medical Record   Expected Length of Stay (days) 3   Communicated expected length of stay with patient/caregiver yes   Prior to hospitilization cognitive status: Alert/Oriented   Prior to hospitalization functional status: Independent   Current cognitive status: Alert/Oriented   Current Functional Status: Independent   Lives With spouse   Able to Return to Prior Arrangements yes   Is patient able to care for self after discharge? Yes   Who are your caregiver(s) and their phone number(s)? Spouse [phone]   Patient's perception of discharge disposition home or selfcare   Readmission Within the Last 30 Days no previous admission in last 30 days   Patient currently being followed by outpatient case management? No   Patient currently receives any other outside agency services? No   Equipment Currently Used at Home none   Do you have any problems affording any of your prescribed medications? No   Is the patient taking medications as prescribed? no   Does the patient have transportation home? No   Transportation Anticipated family or friend will provide   Does the patient receive services at the [facility]? No   Discharge Plan A Home   Discharge Plan B Home Health   DME Needed Upon Discharge  other (see comments)  (TBD)   Patient/Family in Agreement with Plan yes         Patient admitted for Bacteremia. He states he lives at home with spouse. He states he is pretty independent at home, and denies any needs or concerns at this time for discharge. Patient educated on diagnosis for this admission, and patient verbalizes understanding. Post acute needs will be determined on culture results. Patient educated on this and voices understanding. Discharge planning brochure and educational handout of what signs and symptoms to  look for after discharge, and how to manage health at home, given to patient and family. Patient and family are encouraged to call with any questions or help the would need. Contact information given. CM will continue to follow patient throughout the transitions of care, and assist with any discharge needs.

## 2019-05-20 NOTE — PROGRESS NOTES
Nursing Notes  Walking rounding.  Report given to Nicole POWELL and Faby ESCOTO.  Patient awake without complaints.   Huddle Comments

## 2019-05-20 NOTE — PLAN OF CARE
05/20/19 1050   Advance Directives (For Healthcare)   Advance Directive  (If Adv Dir status is received, view document under Code in header or Chart Review Media tab) Patient does not have Advance Directive, declines information.

## 2019-05-20 NOTE — ED PROVIDER NOTES
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 The patient had been seen last night with chills, fever, and a UTI was diagnosed. He had also had pain in the left groin; ultrasound was neg for DVT. Blood cultures were drawn at that time, and the patient received Rocephin IV. He has since taken 2 doses of Cipro. At the present time, he feels well without fever, chills, or left leg pain.        Review of patient's allergies indicates:   Allergen Reactions    Niacin preparations Rash    Bactrim [sulfamethoxazole-trimethoprim] Hives and Itching     Past Medical History:   Diagnosis Date    Anemia     Aplasia bone marrow     Asthma     CA of prostate     CML (chronic myeloid leukemia)     Coronary artery disease     Heart attack 6/17/2014    Heart failure     Hyperlipidemia     Hypertension     Hypothyroidism     Kidney stones     Prostate cancer     Thyroid disease     Urinary incontinence      Past Surgical History:   Procedure Laterality Date    APPENDECTOMY  1966    ATRIAL CARDIAC PACEMAKER INSERTION  10/2018    CHOLECYSTECTOMY  1980    CORONARY ARTERY BYPASS GRAFT  1988    CORONARY STENT PLACEMENT  1990    CYSTOSCOPY      CYSTOSCOPY N/A 12/3/2014    [facility] 1ST FLR    CYSTOSCOPY N/A 5/7/2014    [facility]    DILATION-URETHRA N/A 12/3/2014    [facility] 1ST FLR    DILATION-URETHRA N/A 5/7/2014    [facility] OR    heart attack  6/17/2014    INSERTION-CATHETER N/A 5/7/2014    [facility] at [facility] OR    PROSTATE SURGERY      TURP    radiation      for ca prostate    TONSILLECTOMY      As child    TRANSURETHRAL RESECTION OF PROSTATE      x3     TRANSURETHRAL RESECTION OF PROSTATE      urethral dilation      VASECTOMY  1979     Family History   Problem Relation Age of Onset    Diabetes Father      Social History     Tobacco Use    Smoking status: Former Smoker     Packs/day: 1.00     Years: 25.00     Pack years: 25.00     Types: Cigarettes     Last attempt to quit: 1988     Years since quittin.0    Smokeless tobacco: Never Used   Substance Use Topics    Alcohol use: No    Drug use: No     Review of Systems   All other systems reviewed and are negative.      Physical Exam     Initial Vitals   BP Pulse Resp Temp SpO2   -- -- -- -- --      MAP       --         Physical Exam    Nursing note and vitals reviewed.  Constitutional: He appears well-developed and well-nourished.   HENT:   Head: Normocephalic and atraumatic.   Nose: Nose normal.   Mouth/Throat: Oropharynx is clear and moist.   Eyes: Conjunctivae and EOM are normal. Pupils are equal, round, and reactive to light.   Neck: Normal range of motion. Neck supple.   Cardiovascular: Normal rate, regular rhythm and intact distal pulses. Exam reveals no gallop and no friction rub.    Murmur heard.  There is a 3/6 holosystolic murmur at the apex.   Pulmonary/Chest: Breath sounds normal.   Abdominal: Soft. Bowel sounds are normal. He exhibits no distension and no mass. There is no tenderness. There is no rebound and no guarding.   Musculoskeletal: Normal range of motion.   Neurological: He is alert and oriented to person, place, and time. He has normal strength.   Skin: Skin is warm and dry. Capillary refill takes less than 2 seconds.         ED Course   Procedures  Labs Reviewed - No data to display       Imaging Results    None          Medical Decision Making:   Initial Assessment:   UTI with positive 24 hour blood cultures for gram neg rods. Pt called to return for IV antibiotic therapy.  Differential Diagnosis:   Bacteremia  UTI  Hx CML in remission  Clinical Tests:   Lab Tests: Ordered and  Reviewed                      Clinical Impression:       ICD-10-CM ICD-9-CM   1. Bacteremia due to Gram-negative bacteria R78.81 790.7     041.85   2. Urinary tract infection without hematuria, site unspecified N39.0 599.0         Disposition:   Disposition: Admitted  Condition: Stable  Discussed with Dr. Hernandez who is admitting MD.                        Candelaria Vora MD  05/20/19 0600

## 2019-05-20 NOTE — H&P
[facility] Medicine  History & Physical    Patient Name: James Quan Jr.  MRN: [id number]  Admission Date: 5/19/2019  Attending Physician: Sandor Hernandez MD   Primary Care Provider: Alex Mcallister MD         Patient information was obtained from patient and ER records.     Subjective:     Principal Problem:Bacteremia due to Gram-negative bacteria    Chief Complaint:   Chief Complaint   Patient presents with    Abnormal Lab        HPI: 72yo male patient admitted to floor from ER. Presented yesterday with UTI and sent home with cipro po. He reports that he was having chills and fever at home that started Sunday AM. He denies dysuria or urinary frequency/difficulty. Did report back pain associated. Bilateral lower back pain but thought that was from working in yard. Called back from ER for positive blood cultures. T max 102.9 , but last temp recorded 101 5/19 620 . No elevated WBC. On levaquin 750mgIV. Pt does have hx of prostate ca s/p TURP. Does occasionally self cath at home. At most once per week. Last cath was 3 weeks ago.     Pt has hx of CMLon gleevec, CAD/CHF/HTN/HLD/ hypothyroidism.     Past Medical History:   Diagnosis Date    Anemia     Aplasia bone marrow     Asthma     CA of prostate     CML (chronic myeloid leukemia)     Coronary artery disease     Heart attack 6/17/2014    Heart failure     Hyperlipidemia     Hypertension     Hypothyroidism     Kidney stones     Prostate cancer     Thyroid disease     Urinary incontinence        Past Surgical History:   Procedure Laterality Date    APPENDECTOMY  1966    ATRIAL CARDIAC PACEMAKER INSERTION  10/2018    CHOLECYSTECTOMY  1980    CORONARY ARTERY BYPASS GRAFT  1988    CORONARY STENT PLACEMENT  1990    CYSTOSCOPY      CYSTOSCOPY N/A 12/3/2014    [facility]    CYSTOSCOPY N/A 5/7/2014    [facility]    DILATION-URETHRA  N/A 12/3/2014    [facility]ST FLR    DILATION-URETHRA N/A 5/7/2014    [facility] OR    heart attack  6/17/2014    INSERTION-CATHETER N/A 5/7/2014    [facility] at [facility] OR    PROSTATE SURGERY      TURP    radiation      for ca prostate    TONSILLECTOMY      As child    TRANSURETHRAL RESECTION OF PROSTATE      x3    TRANSURETHRAL RESECTION OF PROSTATE      urethral dilation      VASECTOMY  1979       Review of patient's allergies indicates:   Allergen Reactions    Niacin preparations Rash    Bactrim [sulfamethoxazole-trimethoprim] Hives and Itching       No current facility-administered medications on file prior to encounter.      Current Outpatient Medications on File Prior to Encounter   Medication Sig    ascorbic acid, vitamin C, (VITAMIN C) 100 MG tablet Take 100 mg by mouth once daily.    aspirin (ECOTRIN) 81 MG EC tablet Take 81 mg by mouth once daily.    b complex vitamins tablet Take 1 tablet by mouth once daily.    beta carotene 57812 UNIT capsule Take 10,000 Units by mouth once daily.     ciprofloxacin HCl (CIPRO) 500 MG tablet Take 1 tablet (500 mg total) by mouth 2 (two) times daily. for 7 days    cyanocobalamin (VITAMIN B-12) 1000 MCG tablet Take 1,000 mcg by mouth nightly.     ferrous sulfate 325 (65 FE) MG EC tablet Take 325 mg by mouth once daily.    fish oil-omega-3 fatty acids 300-1,000 mg capsule Take by mouth once daily.    folic acid (FOLVITE) 400 MCG tablet Take 400 mcg by mouth once daily.    GLEEVEC 400 mg Tab 400 mg once daily.     isosorbide mononitrate (IMDUR) 30 MG 24 hr tablet Take 30 mg by mouth once daily.    levothyroxine (SYNTHROID) 100 MCG tablet TAKE 1 TABLET EVERY DAY    losartan (COZAAR) 25 MG tablet Take 50 mg by mouth once daily.     lycopene 10 mg Cap Take 1 capsule by mouth nightly.     PRASTERONE, DHEA, (DHEA ORAL) Take 25 mg by mouth once daily. 50 mg. tablet      vitamin D (VITAMIN D3) 1000 units Tab Take 2,000 Units by mouth once daily.    atorvastatin (LIPITOR) 40 MG tablet Take 40 mg by mouth once daily.     catheter (BARD RUBBER UTILITY CATHETER) 14 Fr Misc 1 Units by Misc.(Non-Drug; Combo Route) route every 7 days. (Patient taking differently: 1 Units by Misc.(Non-Drug; Combo Route) route every other day. )    NITROSTAT 0.4 mg SL tablet Place 0.4 mg under the tongue every 5 (five) minutes as needed.     RANEXA 1,000 mg Tb12 Take 500 mg by mouth once daily.      Family History     Problem Relation (Age of Onset)    Diabetes Father        Tobacco Use    Smoking status: Former Smoker     Packs/day: 1.00     Years: 25.00     Pack years: 25.00     Types: Cigarettes     Last attempt to quit: 1988     Years since quittin.0    Smokeless tobacco: Never Used   Substance and Sexual Activity    Alcohol use: No    Drug use: No    Sexual activity: Yes     Partners: Female     Review of Systems   Constitutional: Positive for chills, fatigue and fever. Negative for activity change and unexpected weight change.   HENT: Negative for congestion, ear pain, hearing loss, rhinorrhea and sore throat.    Eyes: Negative for pain, redness and visual disturbance.   Respiratory: Negative for cough, shortness of breath and wheezing.    Cardiovascular: Negative for chest pain, palpitations and leg swelling.   Gastrointestinal: Negative for abdominal pain, constipation, diarrhea, nausea and vomiting.   Genitourinary: Positive for hematuria. Negative for decreased urine volume, dysuria, frequency and urgency.   Musculoskeletal: Negative for back pain, joint swelling and neck pain.   Skin: Negative for color change, rash and wound.   Neurological: Negative for dizziness, tremors, weakness, light-headedness and headaches.     Objective:     Vital Signs (Most Recent):  Temp: 97.9 °F (36.6 °C) (19)  Pulse: 60 (1948)  Resp: 18 (1948)  BP: (!) 154/67 (19  0748)  SpO2: 100 % (05/20/19 0748) Vital Signs (24h Range):  Temp:  [97.5 °F (36.4 °C)-99 °F (37.2 °C)] 97.9 °F (36.6 °C)  Pulse:  [60-72] 60  Resp:  [16-18] 18  SpO2:  [97 %-100 %] 100 %  BP: (121-170)/(56-74) 154/67     Weight: 79.1 kg (174 lb 6.1 oz)  Body mass index is 28.15 kg/m².    Physical Exam   Constitutional: He is oriented to person, place, and time. He appears well-developed and well-nourished. No distress.   HENT:   Head: Normocephalic and atraumatic.   Right Ear: External ear normal.   Left Ear: External ear normal.   Eyes: Pupils are equal, round, and reactive to light. Conjunctivae and EOM are normal. Right eye exhibits no discharge. Left eye exhibits no discharge.   Neck: Neck supple. No tracheal deviation present.   Cardiovascular: Normal rate and regular rhythm.   No murmur heard.  Pulmonary/Chest: Effort normal and breath sounds normal. No respiratory distress. He has no wheezes.   Abdominal: Soft. Bowel sounds are normal. He exhibits no distension. There is no tenderness. There is no rebound and no guarding.   Musculoskeletal: He exhibits no edema.   Neurological: He is alert and oriented to person, place, and time. No cranial nerve deficit.   Skin: Skin is warm and dry.   Psychiatric: He has a normal mood and affect. His behavior is normal.   Nursing note and vitals reviewed.        CRANIAL NERVES     CN III, IV, VI   Pupils are equal, round, and reactive to light.  Extraocular motions are normal.        Significant Labs:   Blood Culture:   Recent Labs   Lab 05/19/19  0437   LABBLOO Gram stain audie bottle: Gram negative rods  Results called to and read back by:Castro Quintero MD 05/19/2019  22:48  Gram stain aer bottle: Gram negative rods  No Growth to date     CBC:   Recent Labs   Lab 05/19/19  0400 05/19/19  2348   WBC 6.83 6.11   HGB 12.0* 10.2*   HCT 35.1* 30.4*    219     CMP:   Recent Labs   Lab 05/19/19  0400 05/19/19  2348    140   K 3.9 3.6    109   CO2 25 23   GLU  122* 103   BUN 20 19   CREATININE 1.4 1.3   CALCIUM 9.1 8.4*   PROT 6.8 6.0   ALBUMIN 3.5 3.1*   BILITOT 0.7 0.4   ALKPHOS 69 58   AST 22 29   ALT 19 26   ANIONGAP 10 8   EGFRNONAA 50* 55*     Lab Results   Component Value Date    DDIMER 1.52 (H) 05/19/2019     Recent Labs   Lab 05/19/19  0400   LACTATE 2.0     Troponin:   Recent Labs   Lab 05/19/19  0400   TROPONINI 0.019     TUrine Studies:   Recent Labs   Lab 05/19/19  0543   COLORU Yellow   APPEARANCEUA Clear   PHUR 5.0   SPECGRAV 1.025   PROTEINUA 2+*   GLUCUA Negative   KETONESU Trace*   BILIRUBINUA 1+*   OCCULTUA Trace*   NITRITE Negative   UROBILINOGEN Negative   LEUKOCYTESUR 2+*   RBCUA 2   WBCUA >100*   BACTERIA Few*   HYALINECASTS 0     Blood culture x 2-one no growth    One with gram negative       urine culture in process    CXR Left cardiac pacer and prior sternotomy.  No acute findings    US lower legs No evidence of deep venous thrombosis in the left lower extremity.    EKG Normal sinus rhythm  Left anterior fascicular block  Right bundle branch block      Assessment/Plan:     * Bacteremia due to Gram-negative bacteria  Admitted due to positive blood cx obtained in ER  Cont IV levquin and follow blood cultures   Repeat blood cultures 5/20/19        Acute cystitis without hematuria  The etiology of his bacteremia  Levaquin, follow up cultures  Sees Dr Guo in [facility] for urology       Coronary artery disease involving coronary bypass graft of native heart without angina pectoris  Cont isosorbide, statin, asa,  losartan and renexa      CML (chronic myelocytic leukemia)  Cont gleevec      Hypothyroidism due to acquired atrophy of thyroid  Cont levothyroxine      Chronic urethral stricture  Self caths as needed at home        VTE Risk Mitigation (From admission, onward)        Ordered     Place sequential compression device  Until discontinued      05/20/19 0932     IP VTE LOW RISK PATIENT  Once      05/20/19 0046     Place VERONICA hose  Until discontinued       05/20/19 0046             Afsaneh Sim MD  Department of Hospital Medicine   [facility]

## 2019-05-20 NOTE — NURSING
Report received from SHERRY Huynh. Patient Afebrile with no complaints of pain. No signs of distress. Last bowel movement past night. Call light in reach. Bed in Low position. Patient ambulates to and from bathroom. IV saline locked. Clean dry and in tact.

## 2019-05-20 NOTE — HPI
74yo male patient admitted to floor from ER. Presented yesterday with UTI and sent home with cipro po. He reports that he was having chills and fever at home that started Sunday AM. He denies dysuria or urinary frequency/difficulty. Did report back pain associated. Bilateral lower back pain but thought that was from working in yard. Called back from ER for positive blood cultures. T max 102.9 , but last temp recorded 101 5/19 620 . No elevated WBC. On levaquin 750mgIV. Pt does have hx of prostate ca s/p TURP. Does occasionally self cath at home. At most once per week. Last cath was 3 weeks ago.     Pt has hx of CMLon gleevec, CAD/CHF/HTN/HLD/ hypothyroidism.

## 2019-05-20 NOTE — PLAN OF CARE
05/20/19 1337   Medicare Message   Important Message from Medicare regarding Discharge Appeal Rights Given to patient/caregiver;Explained to patient/caregiver;Signed/date by patient/caregiver   Date IMM was signed 05/20/19   Time IMM was signed 0005

## 2019-05-20 NOTE — NURSING
Admitted to room 305 per wheelchair with wife and Roxane Levy RN from ED.  Patient awake alert.  No complaints of pain, SOB. Saline lock to left wrist in tact without complications.  Bed low.  Side rails up x2. Bed low.  Call bell in reach.  Oriented to room.

## 2019-05-20 NOTE — SUBJECTIVE & OBJECTIVE
Past Medical History:   Diagnosis Date    Anemia     Aplasia bone marrow     Asthma     CA of prostate     CML (chronic myeloid leukemia)     Coronary artery disease     Heart attack 6/17/2014    Heart failure     Hyperlipidemia     Hypertension     Hypothyroidism     Kidney stones     Prostate cancer     Thyroid disease     Urinary incontinence        Past Surgical History:   Procedure Laterality Date    APPENDECTOMY  1966    ATRIAL CARDIAC PACEMAKER INSERTION  10/2018    CHOLECYSTECTOMY  1980    CORONARY ARTERY BYPASS GRAFT  1988    CORONARY STENT PLACEMENT  1990    CYSTOSCOPY      CYSTOSCOPY N/A 12/3/2014    [facility]ST [facility]LR    CYSTOSCOPY N/A 5/7/2014    [facility] at [facility] OR    DILATION-URETHRA N/A 12/3/2014    [facility]formed [facility] at [facility]ST [facility]    DILATION-URETHRA N/A 5/7/2014    Performed by [facility] at [facility] OR    heart attack  6/17/2014    INSERTION-CATHETER N/A 5/7/2014    Performed by [facility] at [facility] OR    PROSTATE SURGERY      TURP    radiation      for ca prostate    TONSILLECTOMY      As child    TRANSURETHRAL RESECTION OF PROSTATE      x3    TRANSURETHRAL RESECTION OF PROSTATE      urethral dilation      VASECTOMY  1979       Review of patient's allergies indicates:   Allergen Reactions    Niacin preparations Rash    Bactrim [sulfamethoxazole-trimethoprim] Hives and Itching       No current facility-administered medications on file prior to encounter.      Current Outpatient Medications on File Prior to Encounter   Medication Sig    ascorbic acid, vitamin C, (VITAMIN C) 100 MG tablet Take 100 mg by mouth once daily.    aspirin (ECOTRIN) 81 MG EC tablet Take 81 mg by mouth once daily.    b complex vitamins tablet Take 1 tablet by mouth once daily.    beta carotene 00476 UNIT capsule Take 10,000 Units by mouth once daily.     ciprofloxacin HCl (CIPRO) 500 MG tablet Take 1  tablet (500 mg total) by mouth 2 (two) times daily. for 7 days    cyanocobalamin (VITAMIN B-12) 1000 MCG tablet Take 1,000 mcg by mouth nightly.     ferrous sulfate 325 (65 FE) MG EC tablet Take 325 mg by mouth once daily.    fish oil-omega-3 fatty acids 300-1,000 mg capsule Take by mouth once daily.    folic acid (FOLVITE) 400 MCG tablet Take 400 mcg by mouth once daily.    GLEEVEC 400 mg Tab 400 mg once daily.     isosorbide mononitrate (IMDUR) 30 MG 24 hr tablet Take 30 mg by mouth once daily.    levothyroxine (SYNTHROID) 100 MCG tablet TAKE 1 TABLET EVERY DAY    losartan (COZAAR) 25 MG tablet Take 50 mg by mouth once daily.     lycopene 10 mg Cap Take 1 capsule by mouth nightly.     PRASTERONE, DHEA, (DHEA ORAL) Take 25 mg by mouth once daily. 50 mg. tablet     vitamin D (VITAMIN D3) 1000 units Tab Take 2,000 Units by mouth once daily.    atorvastatin (LIPITOR) 40 MG tablet Take 40 mg by mouth once daily.     catheter (BARD RUBBER UTILITY CATHETER) 14 Fr Misc 1 Units by Misc.(Non-Drug; Combo Route) route every 7 days. (Patient taking differently: 1 Units by Misc.(Non-Drug; Combo Route) route every other day. )    NITROSTAT 0.4 mg SL tablet Place 0.4 mg under the tongue every 5 (five) minutes as needed.     RANEXA 1,000 mg Tb12 Take 500 mg by mouth once daily.      Family History     Problem Relation (Age of Onset)    Diabetes Father        Tobacco Use    Smoking status: Former Smoker     Packs/day: 1.00     Years: 25.00     Pack years: 25.00     Types: Cigarettes     Last attempt to quit: 1988     Years since quittin.0    Smokeless tobacco: Never Used   Substance and Sexual Activity    Alcohol use: No    Drug use: No    Sexual activity: Yes     Partners: Female     Review of Systems   Constitutional: Positive for chills, fatigue and fever. Negative for activity change and unexpected weight change.   HENT: Negative for congestion, ear pain, hearing loss, rhinorrhea and sore throat.     Eyes: Negative for pain, redness and visual disturbance.   Respiratory: Negative for cough, shortness of breath and wheezing.    Cardiovascular: Negative for chest pain, palpitations and leg swelling.   Gastrointestinal: Negative for abdominal pain, constipation, diarrhea, nausea and vomiting.   Genitourinary: Positive for hematuria. Negative for decreased urine volume, dysuria, frequency and urgency.   Musculoskeletal: Negative for back pain, joint swelling and neck pain.   Skin: Negative for color change, rash and wound.   Neurological: Negative for dizziness, tremors, weakness, light-headedness and headaches.     Objective:     Vital Signs (Most Recent):  Temp: 97.9 °F (36.6 °C) (05/20/19 0748)  Pulse: 60 (05/20/19 0748)  Resp: 18 (05/20/19 0748)  BP: (!) 154/67 (05/20/19 0748)  SpO2: 100 % (05/20/19 0748) Vital Signs (24h Range):  Temp:  [97.5 °F (36.4 °C)-99 °F (37.2 °C)] 97.9 °F (36.6 °C)  Pulse:  [60-72] 60  Resp:  [16-18] 18  SpO2:  [97 %-100 %] 100 %  BP: (121-170)/(56-74) 154/67     Weight: 79.1 kg (174 lb 6.1 oz)  Body mass index is 28.15 kg/m².    Physical Exam   Constitutional: He is oriented to person, place, and time. He appears well-developed and well-nourished. No distress.   HENT:   Head: Normocephalic and atraumatic.   Right Ear: External ear normal.   Left Ear: External ear normal.   Eyes: Pupils are equal, round, and reactive to light. Conjunctivae and EOM are normal. Right eye exhibits no discharge. Left eye exhibits no discharge.   Neck: Neck supple. No tracheal deviation present.   Cardiovascular: Normal rate and regular rhythm.   No murmur heard.  Pulmonary/Chest: Effort normal and breath sounds normal. No respiratory distress. He has no wheezes.   Abdominal: Soft. Bowel sounds are normal. He exhibits no distension. There is no tenderness. There is no rebound and no guarding.   Musculoskeletal: He exhibits no edema.   Neurological: He is alert and oriented to person, place, and time. No  cranial nerve deficit.   Skin: Skin is warm and dry.   Psychiatric: He has a normal mood and affect. His behavior is normal.   Nursing note and vitals reviewed.        CRANIAL NERVES     CN III, IV, VI   Pupils are equal, round, and reactive to light.  Extraocular motions are normal.        Significant Labs:   Blood Culture:   Recent Labs   Lab 05/19/19  0437   LABBLOO Gram stain audie bottle: Gram negative rods  Results called to and read back by:Castro Quintero MD 05/19/2019  22:48  Gram stain aer bottle: Gram negative rods  No Growth to date     CBC:   Recent Labs   Lab 05/19/19  0400 05/19/19  2348   WBC 6.83 6.11   HGB 12.0* 10.2*   HCT 35.1* 30.4*    219     CMP:   Recent Labs   Lab 05/19/19  0400 05/19/19  2348    140   K 3.9 3.6    109   CO2 25 23   * 103   BUN 20 19   CREATININE 1.4 1.3   CALCIUM 9.1 8.4*   PROT 6.8 6.0   ALBUMIN 3.5 3.1*   BILITOT 0.7 0.4   ALKPHOS 69 58   AST 22 29   ALT 19 26   ANIONGAP 10 8   EGFRNONAA 50* 55*     Lab Results   Component Value Date    DDIMER 1.52 (H) 05/19/2019     Recent Labs   Lab 05/19/19  0400   LACTATE 2.0     Troponin:   Recent Labs   Lab 05/19/19  0400   TROPONINI 0.019     TUrine Studies:   Recent Labs   Lab 05/19/19  0543   COLORU Yellow   APPEARANCEUA Clear   PHUR 5.0   SPECGRAV 1.025   PROTEINUA 2+*   GLUCUA Negative   KETONESU Trace*   BILIRUBINUA 1+*   OCCULTUA Trace*   NITRITE Negative   UROBILINOGEN Negative   LEUKOCYTESUR 2+*   RBCUA 2   WBCUA >100*   BACTERIA Few*   HYALINECASTS 0     Blood culture x 2-one no growth    One with gram negative       urine culture in process    CXR Left cardiac pacer and prior sternotomy.  No acute findings    US lower legs No evidence of deep venous thrombosis in the left lower extremity.    EKG Normal sinus rhythm  Left anterior fascicular block  Right bundle branch block

## 2019-05-20 NOTE — PROGRESS NOTES
Nursing Notes    Walking rounding. Report given to Nicole POWELL and Faby ESCOTO.  Patient awake and alert without complaints.  l  Huddle Comments

## 2019-05-20 NOTE — ASSESSMENT & PLAN NOTE
Admitted due to positive blood cx obtained in ER  Cont IV levquin and follow blood cultures   Repeat blood cultures 5/20/19

## 2019-05-20 NOTE — NURSING TRANSFER
Nursing Transfer Note      5/20/2019     Transfer From: ED 6 to Med Surg 305    Transfer via wheelchair    Transfer with personal belongings    Transported by CAMERON Levy RN    Medicines sent: No meds    Chart send with patient: Yes    Notified: spouse present during transfer    Patient reassessed at: upon arrival to floor    Upon arrival to floor: cardiac monitor applied, patient oriented to room, call bell in reach and bed in lowest position    Patient transferred as noted above. Vitals stable. Report to LINDA Box RN

## 2019-05-20 NOTE — PROGRESS NOTES
Mr Rosado and I discussed his current inpatient meds and home meds. He is a [facility] and very knowledgeable about all of his meds. He was able to tell me what he takes, when, and what they are for. He has complete understanding of them all and has no questions or concerns. He reports no side effects or pain at this time.

## 2019-05-20 NOTE — PLAN OF CARE
Problem: Adult Inpatient Plan of Care  Goal: Plan of Care Review  Plan of Care reviewed with patient.  VERONICA hose applied.  IV antibiotic initiated. Fall risks discussed.  Orientation to room and call bell to call for assist to bathroom.   Monitoring vital signs.  No complaints of discomfort, pain, SOB.  Patient able to reposition self without assist.  Encouraged frequent position changes.

## 2019-05-20 NOTE — ASSESSMENT & PLAN NOTE
The etiology of his bacteremia  Levaquin, follow up cultures  [facility]

## 2019-05-20 NOTE — PROVIDER PROGRESS NOTES - EMERGENCY DEPT.
Encounter Date: 5/19/2019    ED Physician Progress Notes           Positive blood culture result reported at 10:40 p.m.  I immediately called the patient at 10:45 p.m.  I checked 2 patient identifiers on the patient before discussing results  Patient is a former pharmacist, he understands positive blood culture  He will return to the ER ASAP for admission and re-evaluation of sepsis       Castro Quintero M.D. 10:50 PM 5/19/2019

## 2019-05-20 NOTE — ED TRIAGE NOTES
Arrives per self transport from home. Presents for admit. Called in per Dr Quintero after positive blood cultures resulted

## 2019-05-21 LAB
ALBUMIN SERPL BCP-MCNC: 2.8 G/DL (ref 3.5–5.2)
ALP SERPL-CCNC: 51 U/L (ref 55–135)
ALT SERPL W/O P-5'-P-CCNC: 29 U/L (ref 10–44)
ANION GAP SERPL CALC-SCNC: 6 MMOL/L (ref 8–16)
AST SERPL-CCNC: 29 U/L (ref 10–40)
BASOPHILS # BLD AUTO: 0.01 K/UL (ref 0–0.2)
BASOPHILS NFR BLD: 0.2 % (ref 0–1.9)
BILIRUB SERPL-MCNC: 0.4 MG/DL (ref 0.1–1)
BUN SERPL-MCNC: 9 MG/DL (ref 8–23)
CALCIUM SERPL-MCNC: 8.9 MG/DL (ref 8.7–10.5)
CHLORIDE SERPL-SCNC: 109 MMOL/L (ref 95–110)
CO2 SERPL-SCNC: 27 MMOL/L (ref 23–29)
CREAT SERPL-MCNC: 0.9 MG/DL (ref 0.5–1.4)
DIFFERENTIAL METHOD: ABNORMAL
EOSINOPHIL # BLD AUTO: 0.1 K/UL (ref 0–0.5)
EOSINOPHIL NFR BLD: 1.2 % (ref 0–8)
ERYTHROCYTE [DISTWIDTH] IN BLOOD BY AUTOMATED COUNT: 14.5 % (ref 11.5–14.5)
EST. GFR  (AFRICAN AMERICAN): >60 ML/MIN/1.73 M^2
EST. GFR  (NON AFRICAN AMERICAN): >60 ML/MIN/1.73 M^2
GLUCOSE SERPL-MCNC: 103 MG/DL (ref 70–110)
HCT VFR BLD AUTO: 32.8 % (ref 40–54)
HGB BLD-MCNC: 11 G/DL (ref 14–18)
LYMPHOCYTES # BLD AUTO: 1.2 K/UL (ref 1–4.8)
LYMPHOCYTES NFR BLD: 19.5 % (ref 18–48)
MCH RBC QN AUTO: 33.6 PG (ref 27–31)
MCHC RBC AUTO-ENTMCNC: 33.5 G/DL (ref 32–36)
MCV RBC AUTO: 100 FL (ref 82–98)
MONOCYTES # BLD AUTO: 0.9 K/UL (ref 0.3–1)
MONOCYTES NFR BLD: 14.4 % (ref 4–15)
NEUTROPHILS # BLD AUTO: 3.9 K/UL (ref 1.8–7.7)
NEUTROPHILS NFR BLD: 64.7 % (ref 38–73)
PLATELET # BLD AUTO: 241 K/UL (ref 150–350)
PMV BLD AUTO: 10.1 FL (ref 9.2–12.9)
POTASSIUM SERPL-SCNC: 3.7 MMOL/L (ref 3.5–5.1)
PROT SERPL-MCNC: 5.8 G/DL (ref 6–8.4)
RBC # BLD AUTO: 3.27 M/UL (ref 4.6–6.2)
SODIUM SERPL-SCNC: 142 MMOL/L (ref 136–145)
WBC # BLD AUTO: 6.04 K/UL (ref 3.9–12.7)

## 2019-05-21 PROCEDURE — 63600175 PHARM REV CODE 636 W HCPCS: Mod: HCNC | Performed by: NURSE PRACTITIONER

## 2019-05-21 PROCEDURE — 25000003 PHARM REV CODE 250: Mod: HCNC | Performed by: NURSE PRACTITIONER

## 2019-05-21 PROCEDURE — 85025 COMPLETE CBC W/AUTO DIFF WBC: CPT | Mod: HCNC

## 2019-05-21 PROCEDURE — 94761 N-INVAS EAR/PLS OXIMETRY MLT: CPT | Mod: HCNC

## 2019-05-21 PROCEDURE — 99233 SBSQ HOSP IP/OBS HIGH 50: CPT | Mod: HCNC,,, | Performed by: INTERNAL MEDICINE

## 2019-05-21 PROCEDURE — 80053 COMPREHEN METABOLIC PANEL: CPT | Mod: HCNC

## 2019-05-21 PROCEDURE — 99233 PR SUBSEQUENT HOSPITAL CARE,LEVL III: ICD-10-PCS | Mod: HCNC,,, | Performed by: INTERNAL MEDICINE

## 2019-05-21 PROCEDURE — 25000003 PHARM REV CODE 250: Mod: HCNC | Performed by: INTERNAL MEDICINE

## 2019-05-21 PROCEDURE — 36415 COLL VENOUS BLD VENIPUNCTURE: CPT | Mod: HCNC

## 2019-05-21 PROCEDURE — 99900035 HC TECH TIME PER 15 MIN (STAT): Mod: HCNC

## 2019-05-21 PROCEDURE — 11000001 HC ACUTE MED/SURG PRIVATE ROOM: Mod: HCNC

## 2019-05-21 RX ADMIN — LOSARTAN POTASSIUM 50 MG: 50 TABLET, FILM COATED ORAL at 09:05

## 2019-05-21 RX ADMIN — FOLIC ACID TAB 400 MCG 400 MCG: 400 TAB at 12:05

## 2019-05-21 RX ADMIN — ASPIRIN 81 MG: 81 TABLET, COATED ORAL at 09:05

## 2019-05-21 RX ADMIN — LEVOFLOXACIN 750 MG: 750 INJECTION, SOLUTION INTRAVENOUS at 09:05

## 2019-05-21 RX ADMIN — ISOSORBIDE MONONITRATE 30 MG: 30 TABLET, EXTENDED RELEASE ORAL at 09:05

## 2019-05-21 RX ADMIN — RANOLAZINE 500 MG: 500 TABLET, FILM COATED, EXTENDED RELEASE ORAL at 09:05

## 2019-05-21 RX ADMIN — IMATINIB MESYLATE 400 MG: 400 TABLET, FILM COATED ORAL at 12:05

## 2019-05-21 RX ADMIN — ACETAMINOPHEN 650 MG: 325 TABLET ORAL at 09:05

## 2019-05-21 RX ADMIN — LEVOTHYROXINE SODIUM 100 MCG: 100 TABLET ORAL at 05:05

## 2019-05-21 NOTE — PLAN OF CARE
05/21/19 1042   Final Note   Assessment Type Discharge Planning Reassessment   Anticipated Discharge Disposition Home         Patient will remain for continued treatment today. MD is waiting on culture and sensitivity results. Also patient not fever free for 24-48 hours. He states his understanding and agreement with plan of care. He states his understanding of treatment as well. No needs or concerns voiced at this time. CM will continue to follow and assist as needed.

## 2019-05-21 NOTE — PLAN OF CARE
Problem: Adult Inpatient Plan of Care  Goal: Plan of Care Review  Outcome: Ongoing (interventions implemented as appropriate)  Quiet hours, rested well. Medicated X1 with tylenol during the shift for aches. Afebrile. IV antibiotics given as ordered. Plan of care reviewed with pt, voiced understanding. SR up X3, call bell in reach. Instructed to call for needs or assistance.

## 2019-05-21 NOTE — PLAN OF CARE
Problem: Adult Inpatient Plan of Care  Goal: Plan of Care Review  Outcome: Ongoing (interventions implemented as appropriate)  Vitals remained stable,afebrile. No complaints of pain. Ambulating, eating, and voiding well. Waiting for culture sensitivity. IV abx. Needs to be 48 hours without fever. Discussed plan of care with pt, stated understanding

## 2019-05-21 NOTE — NURSING
Bedside report complete. Pt resting in bed, visiting with family, with no distress noted. SR up X3, call bell in reach. Instructed to call for needs or assistance.

## 2019-05-21 NOTE — ASSESSMENT & PLAN NOTE
The etiology of his bacteremia  Levaquin, follow up cultures  [facility] Gu[facility] .

## 2019-05-21 NOTE — ASSESSMENT & PLAN NOTE
Admitted due to positive blood cx obtained in ER  Cont IV levquin and follow blood cultures gram negative with sensitivity pending   Repeat blood cultures 5/20/19- ths far no growth  Last temp 5pm 5/20- will need to be 48hr afebrile on IV abx prior to d/c. Will need 14 days abx total.

## 2019-05-21 NOTE — PROGRESS NOTES
[facility] Hospital Medicine  Progress Note    Patient Name: James Quan Jr.  MRN: [id number]  Patient Class: IP- Inpatient   Admission Date: 5/19/2019  Length of Stay: 2 days  Attending Physician: Sandor Hernandez MD  Primary Care Provider: Alex Mcallister MD        Subjective:     Principal Problem:Bacteremia due to Gram-negative bacteria    HPI:  72yo male patient admitted to floor from ER. Presented yesterday with UTI and sent home with cipro po. He reports that he was having chills and fever at home that started Sunday AM. He denies dysuria or urinary frequency/difficulty. Did report back pain associated. Bilateral lower back pain but thought that was from working in yard. Called back from ER for positive blood cultures. T max 102.9 , but last temp recorded 101 5/19 620 . No elevated WBC. On levaquin 750mgIV. Pt does have hx of prostate ca s/p TURP. Does occasionally self cath at home. At most once per week. Last cath was 3 weeks ago.     Pt has hx of CMLon gleevec, CAD/CHF/HTN/HLD/ hypothyroidism.     Hospital Course:  Pt doing well. He is on IV levaquin day 3 today. Last temp recorded 100.8 1722 5/20/19. No elevated WBC. Urine culture resulted multiple organisms. Blood culture  Gram negative- sensitivity pending. Repeated blood cultures yesterday thus far no growth.    Review of Systems   Constitutional: Positive for fever. Negative for activity change and unexpected weight change.   HENT: Negative for congestion, ear pain, hearing loss, rhinorrhea and sore throat.    Eyes: Negative for pain, redness and visual disturbance.   Respiratory: Negative for cough, shortness of breath and wheezing.    Cardiovascular: Negative for chest pain, palpitations and leg swelling.   Gastrointestinal: Negative for abdominal pain, constipation, diarrhea, nausea and vomiting.   Genitourinary: Negative for decreased urine volume, dysuria, frequency and urgency.   Musculoskeletal: Negative for  back pain, joint swelling and neck pain.   Skin: Negative for color change, rash and wound.   Neurological: Negative for dizziness, tremors, weakness, light-headedness and headaches.     Objective:     Vital Signs (Most Recent):  Temp: 97.4 °F (36.3 °C) (05/21/19 0729)  Pulse: 60 (05/21/19 0729)  Resp: 18 (05/21/19 0729)  BP: (!) 157/69 (05/21/19 0729)  SpO2: 99 % (05/21/19 0729) Vital Signs (24h Range):  Temp:  [97.4 °F (36.3 °C)-100.8 °F (38.2 °C)] 97.4 °F (36.3 °C)  Pulse:  [60-70] 60  Resp:  [16-20] 18  SpO2:  [97 %-99 %] 99 %  BP: (125-157)/(62-93) 157/69     Weight: 79.1 kg (174 lb 6.1 oz)  Body mass index is 28.15 kg/m².    Physical Exam   Constitutional: He is oriented to person, place, and time. He appears well-developed and well-nourished. No distress.   HENT:   Head: Normocephalic and atraumatic.   Right Ear: External ear normal.   Left Ear: External ear normal.   Eyes: Pupils are equal, round, and reactive to light. Conjunctivae and EOM are normal. Right eye exhibits no discharge. Left eye exhibits no discharge.   Neck: Neck supple. No tracheal deviation present.   Cardiovascular: Normal rate and regular rhythm.   No murmur heard.  Pulmonary/Chest: Effort normal and breath sounds normal. No respiratory distress. He has no wheezes.   Abdominal: Soft. Bowel sounds are normal. He exhibits no distension. There is no tenderness. There is no rebound and no guarding.   Musculoskeletal: He exhibits no edema.   Neurological: He is alert and oriented to person, place, and time. No cranial nerve deficit.   Skin: Skin is warm and dry.   Psychiatric: He has a normal mood and affect. His behavior is normal.   Nursing note and vitals reviewed.        CRANIAL NERVES     CN III, IV, VI   Pupils are equal, round, and reactive to light.  Extraocular motions are normal.        Significant Labs:   Blood Culture:   Recent Labs   Lab 05/20/19  0941   LABBLOO No Growth to date  No Growth to date     CBC:   Recent Labs   Lab  05/19/19  2348 05/20/19  0940 05/21/19  0611   WBC 6.11 5.86 6.04   HGB 10.2* 10.6* 11.0*   HCT 30.4* 31.3* 32.8*    220 241     CMP:   Recent Labs   Lab 05/19/19  2348 05/20/19  0940 05/21/19  0611    140 142   K 3.6 3.6 3.7    109 109   CO2 23 26 27    115* 103   BUN 19 15 9   CREATININE 1.3 1.1 0.9   CALCIUM 8.4* 8.7 8.9   PROT 6.0 6.0 5.8*   ALBUMIN 3.1* 2.9* 2.8*   BILITOT 0.4 0.4 0.4   ALKPHOS 58 51* 51*   AST 29 33 29   ALT 26 27 29   ANIONGAP 8 5* 6*   EGFRNONAA 55* >60 >60     Lab Results   Component Value Date    DDIMER 1.52 (H) 05/19/2019     Blood culture x 2-one no growth    One with gram negative sensitivity pending      urine culture in process- multiple organisms     CXR Left cardiac pacer and prior sternotomy.  No acute findings    US lower legs No evidence of deep venous thrombosis in the left lower extremity.    EKG Normal sinus rhythm  Left anterior fascicular block  Right bundle branch block      Assessment/Plan:      * Bacteremia due to Gram-negative bacteria  Admitted due to positive blood cx obtained in ER  Cont IV levquin and follow blood cultures gram negative with sensitivity pending   Repeat blood cultures 5/20/19- ths far no growth  Last temp 5pm 5/20- will need to be 48hr afebrile on IV abx prior to d/c. Will need 14 days abx total.        Acute cystitis without hematuria  The etiology of his bacteremia  Levaquin, follow up cultures  Sees Dr Guo in [facility] for urology .      Coronary artery disease involving coronary bypass graft of native heart without angina pectoris  Cont isosorbide, statin, asa,  losartan and renexa.      CML (chronic myelocytic leukemia)  Cont gleevec  Monitor counts; stable.    Hypothyroidism due to acquired atrophy of thyroid  Cont levothyroxine.      Chronic urethral stricture  Self caths as needed at home        VTE Risk Mitigation (From admission, onward)        Ordered     Place sequential compression device  Until discontinued       05/20/19 0932     IP VTE LOW RISK PATIENT  Once      05/20/19 0046     Place VERONICA hose  Until discontinued      05/20/19 0046              Davidson Perdomo MD  Department of Hospital Medicine   [facility]

## 2019-05-21 NOTE — HOSPITAL COURSE
Pt doing well. He is on IV levaquin day 3 today. Last temp recorded 100.8 1722 5/20/19. No elevated WBC. Urine culture resulted multiple organisms. Blood culture  Gram negative- sensitivity pending. Repeated blood cultures yesterday thus far no growth.    5/22 pt is doing well on IV levaqiun. Last fever was evening of 5/20. Original blood cultures from ER still showing one no growth and one with ecoli- sensitivity pending. Repeat blood cultures from 5/20 NGTD x 2. Urine grew back multiple organisms.     5/23 Pt was changed to rocephin yesterday as culture sensitivity was reviewed. Will get second dose today and he is sensitive to po augmentin. Will d/c today as he has not had fever since 5pm 5/20

## 2019-05-21 NOTE — SUBJECTIVE & OBJECTIVE
Review of Systems   Constitutional: Positive for fever. Negative for activity change and unexpected weight change.   HENT: Negative for congestion, ear pain, hearing loss, rhinorrhea and sore throat.    Eyes: Negative for pain, redness and visual disturbance.   Respiratory: Negative for cough, shortness of breath and wheezing.    Cardiovascular: Negative for chest pain, palpitations and leg swelling.   Gastrointestinal: Negative for abdominal pain, constipation, diarrhea, nausea and vomiting.   Genitourinary: Negative for decreased urine volume, dysuria, frequency and urgency.   Musculoskeletal: Negative for back pain, joint swelling and neck pain.   Skin: Negative for color change, rash and wound.   Neurological: Negative for dizziness, tremors, weakness, light-headedness and headaches.     Objective:     Vital Signs (Most Recent):  Temp: 97.4 °F (36.3 °C) (05/21/19 0729)  Pulse: 60 (05/21/19 0729)  Resp: 18 (05/21/19 0729)  BP: (!) 157/69 (05/21/19 0729)  SpO2: 99 % (05/21/19 0729) Vital Signs (24h Range):  Temp:  [97.4 °F (36.3 °C)-100.8 °F (38.2 °C)] 97.4 °F (36.3 °C)  Pulse:  [60-70] 60  Resp:  [16-20] 18  SpO2:  [97 %-99 %] 99 %  BP: (125-157)/(62-93) 157/69     Weight: 79.1 kg (174 lb 6.1 oz)  Body mass index is 28.15 kg/m².    Physical Exam   Constitutional: He is oriented to person, place, and time. He appears well-developed and well-nourished. No distress.   HENT:   Head: Normocephalic and atraumatic.   Right Ear: External ear normal.   Left Ear: External ear normal.   Eyes: Pupils are equal, round, and reactive to light. Conjunctivae and EOM are normal. Right eye exhibits no discharge. Left eye exhibits no discharge.   Neck: Neck supple. No tracheal deviation present.   Cardiovascular: Normal rate and regular rhythm.   No murmur heard.  Pulmonary/Chest: Effort normal and breath sounds normal. No respiratory distress. He has no wheezes.   Abdominal: Soft. Bowel sounds are normal. He exhibits no distension.  There is no tenderness. There is no rebound and no guarding.   Musculoskeletal: He exhibits no edema.   Neurological: He is alert and oriented to person, place, and time. No cranial nerve deficit.   Skin: Skin is warm and dry.   Psychiatric: He has a normal mood and affect. His behavior is normal.   Nursing note and vitals reviewed.        CRANIAL NERVES     CN III, IV, VI   Pupils are equal, round, and reactive to light.  Extraocular motions are normal.        Significant Labs:   Blood Culture:   Recent Labs   Lab 05/20/19  0941   LABBLOO No Growth to date  No Growth to date     CBC:   Recent Labs   Lab 05/19/19  2348 05/20/19  0940 05/21/19  0611   WBC 6.11 5.86 6.04   HGB 10.2* 10.6* 11.0*   HCT 30.4* 31.3* 32.8*    220 241     CMP:   Recent Labs   Lab 05/19/19  2348 05/20/19  0940 05/21/19  0611    140 142   K 3.6 3.6 3.7    109 109   CO2 23 26 27    115* 103   BUN 19 15 9   CREATININE 1.3 1.1 0.9   CALCIUM 8.4* 8.7 8.9   PROT 6.0 6.0 5.8*   ALBUMIN 3.1* 2.9* 2.8*   BILITOT 0.4 0.4 0.4   ALKPHOS 58 51* 51*   AST 29 33 29   ALT 26 27 29   ANIONGAP 8 5* 6*   EGFRNONAA 55* >60 >60     Lab Results   Component Value Date    DDIMER 1.52 (H) 05/19/2019     Blood culture x 2-one no growth    One with gram negative sensitivity pending      urine culture in process- multiple organisms     CXR Left cardiac pacer and prior sternotomy.  No acute findings    US lower legs No evidence of deep venous thrombosis in the left lower extremity.    EKG Normal sinus rhythm  Left anterior fascicular block  Right bundle branch block

## 2019-05-22 PROBLEM — I10 ESSENTIAL HYPERTENSION: Status: ACTIVE | Noted: 2019-05-22

## 2019-05-22 LAB
ALBUMIN SERPL BCP-MCNC: 2.8 G/DL (ref 3.5–5.2)
ALP SERPL-CCNC: 63 U/L (ref 55–135)
ALT SERPL W/O P-5'-P-CCNC: 35 U/L (ref 10–44)
ANION GAP SERPL CALC-SCNC: 7 MMOL/L (ref 8–16)
AST SERPL-CCNC: 28 U/L (ref 10–40)
BACTERIA BLD CULT: NORMAL
BASOPHILS # BLD AUTO: 0.01 K/UL (ref 0–0.2)
BASOPHILS NFR BLD: 0.2 % (ref 0–1.9)
BILIRUB SERPL-MCNC: 0.3 MG/DL (ref 0.1–1)
BUN SERPL-MCNC: 12 MG/DL (ref 8–23)
CALCIUM SERPL-MCNC: 9 MG/DL (ref 8.7–10.5)
CHLORIDE SERPL-SCNC: 107 MMOL/L (ref 95–110)
CO2 SERPL-SCNC: 28 MMOL/L (ref 23–29)
CREAT SERPL-MCNC: 1 MG/DL (ref 0.5–1.4)
DIFFERENTIAL METHOD: ABNORMAL
EOSINOPHIL # BLD AUTO: 0.1 K/UL (ref 0–0.5)
EOSINOPHIL NFR BLD: 2.1 % (ref 0–8)
ERYTHROCYTE [DISTWIDTH] IN BLOOD BY AUTOMATED COUNT: 14.1 % (ref 11.5–14.5)
EST. GFR  (AFRICAN AMERICAN): >60 ML/MIN/1.73 M^2
EST. GFR  (NON AFRICAN AMERICAN): >60 ML/MIN/1.73 M^2
GLUCOSE SERPL-MCNC: 107 MG/DL (ref 70–110)
HCT VFR BLD AUTO: 33.9 % (ref 40–54)
HGB BLD-MCNC: 11.5 G/DL (ref 14–18)
LYMPHOCYTES # BLD AUTO: 1.5 K/UL (ref 1–4.8)
LYMPHOCYTES NFR BLD: 26.1 % (ref 18–48)
MCH RBC QN AUTO: 33.6 PG (ref 27–31)
MCHC RBC AUTO-ENTMCNC: 33.9 G/DL (ref 32–36)
MCV RBC AUTO: 99 FL (ref 82–98)
MONOCYTES # BLD AUTO: 0.7 K/UL (ref 0.3–1)
MONOCYTES NFR BLD: 11.5 % (ref 4–15)
NEUTROPHILS # BLD AUTO: 3.4 K/UL (ref 1.8–7.7)
NEUTROPHILS NFR BLD: 60.1 % (ref 38–73)
PLATELET # BLD AUTO: 268 K/UL (ref 150–350)
PMV BLD AUTO: 9.7 FL (ref 9.2–12.9)
POTASSIUM SERPL-SCNC: 3.7 MMOL/L (ref 3.5–5.1)
PROT SERPL-MCNC: 6.1 G/DL (ref 6–8.4)
RBC # BLD AUTO: 3.42 M/UL (ref 4.6–6.2)
SODIUM SERPL-SCNC: 142 MMOL/L (ref 136–145)
WBC # BLD AUTO: 5.67 K/UL (ref 3.9–12.7)

## 2019-05-22 PROCEDURE — 94761 N-INVAS EAR/PLS OXIMETRY MLT: CPT | Mod: HCNC

## 2019-05-22 PROCEDURE — 11000001 HC ACUTE MED/SURG PRIVATE ROOM: Mod: HCNC

## 2019-05-22 PROCEDURE — 99232 SBSQ HOSP IP/OBS MODERATE 35: CPT | Mod: HCNC,,, | Performed by: FAMILY MEDICINE

## 2019-05-22 PROCEDURE — 80053 COMPREHEN METABOLIC PANEL: CPT | Mod: HCNC

## 2019-05-22 PROCEDURE — 99900035 HC TECH TIME PER 15 MIN (STAT): Mod: HCNC

## 2019-05-22 PROCEDURE — 25000003 PHARM REV CODE 250: Mod: HCNC | Performed by: INTERNAL MEDICINE

## 2019-05-22 PROCEDURE — 36415 COLL VENOUS BLD VENIPUNCTURE: CPT | Mod: HCNC

## 2019-05-22 PROCEDURE — 25000003 PHARM REV CODE 250: Mod: HCNC | Performed by: NURSE PRACTITIONER

## 2019-05-22 PROCEDURE — 85025 COMPLETE CBC W/AUTO DIFF WBC: CPT | Mod: HCNC

## 2019-05-22 PROCEDURE — 63600175 PHARM REV CODE 636 W HCPCS: Mod: HCNC | Performed by: NURSE PRACTITIONER

## 2019-05-22 PROCEDURE — 99232 PR SUBSEQUENT HOSPITAL CARE,LEVL II: ICD-10-PCS | Mod: HCNC,,, | Performed by: FAMILY MEDICINE

## 2019-05-22 PROCEDURE — 99900031 HC PATIENT EDUCATION (STAT): Mod: HCNC

## 2019-05-22 RX ORDER — LOSARTAN POTASSIUM 50 MG/1
100 TABLET ORAL DAILY
Status: DISCONTINUED | OUTPATIENT
Start: 2019-05-22 | End: 2019-05-23 | Stop reason: HOSPADM

## 2019-05-22 RX ORDER — LOSARTAN POTASSIUM 50 MG/1
100 TABLET ORAL DAILY
Status: DISCONTINUED | OUTPATIENT
Start: 2019-05-23 | End: 2019-05-22

## 2019-05-22 RX ADMIN — LEVOTHYROXINE SODIUM 100 MCG: 100 TABLET ORAL at 06:05

## 2019-05-22 RX ADMIN — ACETAMINOPHEN 650 MG: 325 TABLET ORAL at 09:05

## 2019-05-22 RX ADMIN — IMATINIB MESYLATE 400 MG: 400 TABLET, FILM COATED ORAL at 09:05

## 2019-05-22 RX ADMIN — ASPIRIN 81 MG: 81 TABLET, COATED ORAL at 09:05

## 2019-05-22 RX ADMIN — ISOSORBIDE MONONITRATE 30 MG: 30 TABLET, EXTENDED RELEASE ORAL at 09:05

## 2019-05-22 RX ADMIN — FOLIC ACID TAB 400 MCG 400 MCG: 400 TAB at 09:05

## 2019-05-22 RX ADMIN — LOSARTAN POTASSIUM 100 MG: 50 TABLET, FILM COATED ORAL at 09:05

## 2019-05-22 RX ADMIN — RANOLAZINE 500 MG: 500 TABLET, FILM COATED, EXTENDED RELEASE ORAL at 09:05

## 2019-05-22 RX ADMIN — CEFTRIAXONE 1 G: 1 INJECTION, SOLUTION INTRAVENOUS at 11:05

## 2019-05-22 NOTE — PLAN OF CARE
Problem: Adult Inpatient Plan of Care  Goal: Plan of Care Review  Outcome: Ongoing (interventions implemented as appropriate)     05/22/19 0559   Plan of Care Review   Plan of Care Reviewed With patient   Progress improving     Goal: Patient-Specific Goal (Individualization)  Outcome: Ongoing (interventions implemented as appropriate)     05/22/19 0559   OTHER   Anxieties, Fears or Concerns None verbalized   Individualized Care Needs Care provided as to provide rest during sleeping hours.   Patient-Specific Goal (Individualization)   Patient-Specific Goals (Include Timeframe) Patient's antibiotics and tylenol adminstered as ordered overnight.

## 2019-05-22 NOTE — SUBJECTIVE & OBJECTIVE
Review of Systems   Constitutional: Negative for activity change, fever and unexpected weight change.   HENT: Negative for congestion, ear pain, hearing loss, rhinorrhea and sore throat.    Eyes: Negative for pain, redness and visual disturbance.   Respiratory: Negative for cough, shortness of breath and wheezing.    Cardiovascular: Negative for chest pain, palpitations and leg swelling.   Gastrointestinal: Negative for abdominal pain, constipation, diarrhea, nausea and vomiting.   Genitourinary: Negative for decreased urine volume, dysuria, frequency and urgency.   Musculoskeletal: Negative for back pain, joint swelling and neck pain.   Skin: Negative for color change, rash and wound.   Neurological: Negative for dizziness, tremors, weakness, light-headedness and headaches.     Objective:     Vital Signs (Most Recent):  Temp: 97.6 °F (36.4 °C) (05/22/19 0738)  Pulse: 60 (05/22/19 0738)  Resp: 18 (05/22/19 0738)  BP: (!) 163/70 (05/22/19 0738)  SpO2: (!) 94 % (05/22/19 0738) Vital Signs (24h Range):  Temp:  [97.4 °F (36.3 °C)-99.4 °F (37.4 °C)] 97.6 °F (36.4 °C)  Pulse:  [59-65] 60  Resp:  [18] 18  SpO2:  [94 %-99 %] 94 %  BP: (136-163)/(63-72) 163/70     Weight: 79.1 kg (174 lb 6.1 oz)  Body mass index is 28.15 kg/m².    Physical Exam   Constitutional: He is oriented to person, place, and time. He appears well-developed and well-nourished. No distress.   HENT:   Head: Normocephalic and atraumatic.   Right Ear: External ear normal.   Left Ear: External ear normal.   Eyes: Pupils are equal, round, and reactive to light. Conjunctivae and EOM are normal. Right eye exhibits no discharge. Left eye exhibits no discharge.   Neck: Neck supple. No tracheal deviation present.   Cardiovascular: Normal rate and regular rhythm.   No murmur heard.  Pulmonary/Chest: Effort normal and breath sounds normal. No respiratory distress. He has no wheezes.   Abdominal: Soft. Bowel sounds are normal. He exhibits no distension. There is no  tenderness. There is no rebound and no guarding.   Musculoskeletal: He exhibits no edema.   Neurological: He is alert and oriented to person, place, and time. No cranial nerve deficit.   Skin: Skin is warm and dry.   Psychiatric: He has a normal mood and affect. His behavior is normal.   Nursing note and vitals reviewed.        CRANIAL NERVES     CN III, IV, VI   Pupils are equal, round, and reactive to light.  Extraocular motions are normal.        Significant Labs:   CBC:   Recent Labs   Lab 05/20/19  0940 05/21/19  0611 05/22/19  0607   WBC 5.86 6.04 5.67   HGB 10.6* 11.0* 11.5*   HCT 31.3* 32.8* 33.9*    241 268     CMP:   Recent Labs   Lab 05/20/19  0940 05/21/19  0611 05/22/19  0607    142 142   K 3.6 3.7 3.7    109 107   CO2 26 27 28   * 103 107   BUN 15 9 12   CREATININE 1.1 0.9 1.0   CALCIUM 8.7 8.9 9.0   PROT 6.0 5.8* 6.1   ALBUMIN 2.9* 2.8* 2.8*   BILITOT 0.4 0.4 0.3   ALKPHOS 51* 51* 63   AST 33 29 28   ALT 27 29 35   ANIONGAP 5* 6* 7*   EGFRNONAA >60 >60 >60     Lab Results   Component Value Date    DDIMER 1.52 (H) 05/19/2019 5/19 Blood culture x 2-one no growth    One with gram negative e coli sensitivity pending  5/20 repeat blood cultures x 2 NGTD    urine culture in process- multiple organisms     CXR Left cardiac pacer and prior sternotomy.  No acute findings    US lower legs No evidence of deep venous thrombosis in the left lower extremity.    EKG Normal sinus rhythm  Left anterior fascicular block  Right bundle branch block

## 2019-05-22 NOTE — PLAN OF CARE
Problem: Adult Inpatient Plan of Care  Goal: Plan of Care Review  Outcome: Ongoing (interventions implemented as appropriate)  Patient had a good day; VSS, afebrile on room air.  IV antibiotics changed from Levaquin to Rocephin.  Losartan increased from 50mg daily to 100mg daily.  Patient up and ambulating halls throughout day.  No heart monitor.  Family at beside visiting throughout shift.  Awaiting urinary culture results.  Plan is to wait for culture, possibly discharge tomorrow.  Plan of care discussed at bedside; patient verbalizes understanding.

## 2019-05-22 NOTE — ASSESSMENT & PLAN NOTE
The etiology of his bacteremia  Levaquin, follow up cultures change to rocephin due to culture results  Sees Dr Guo in [facility] for urology .

## 2019-05-22 NOTE — PROGRESS NOTES
Staff Handoff  Bedside handoff report from SHERRY Rivera. POC discussed. Patient's input welcomed. None added; listened to report attentively. Will monitor.      Resident Handoff

## 2019-05-22 NOTE — ASSESSMENT & PLAN NOTE
bp has been running high. He is currently on his home doses of medication. Increase losartan from 50mg>100mg  //70

## 2019-05-22 NOTE — PLAN OF CARE
05/22/19 0952   Discharge Reassessment   Assessment Type Discharge Planning Reassessment           Patient will remain for continued treatment today. Cultures still pending, MD would like to get result of this. Also he will not be fever free for 48 hrs until 7PM tonight. Planned discharge for tomorrow. Patient state understanding and agreement. No needs or concerns voiced at this time. CM will continue to follow and assist as needed.

## 2019-05-22 NOTE — PROGRESS NOTES
[facility] Hospital Medicine  Progress Note    Patient Name: James Quan Jr.  MRN: [id number]  Patient Class: IP- Inpatient   Admission Date: 5/19/2019  Length of Stay: 3 days  Attending Physician: Sandor Hernandez MD  Primary Care Provider: Alex Mcallister MD        Subjective:     Principal Problem:Bacteremia due to Gram-negative bacteria    HPI:  74yo male patient admitted to floor from ER. Presented yesterday with UTI and sent home with cipro po. He reports that he was having chills and fever at home that started Sunday AM. He denies dysuria or urinary frequency/difficulty. Did report back pain associated. Bilateral lower back pain but thought that was from working in yard. Called back from ER for positive blood cultures. T max 102.9 , but last temp recorded 101 5/19 620 . No elevated WBC. On levaquin 750mgIV. Pt does have hx of prostate ca s/p TURP. Does occasionally self cath at home. At most once per week. Last cath was 3 weeks ago.     Pt has hx of CMLon gleevec, CAD/CHF/HTN/HLD/ hypothyroidism.     Hospital Course:  Pt doing well. He is on IV levaquin day 3 today. Last temp recorded 100.8 1722 5/20/19. No elevated WBC. Urine culture resulted multiple organisms. Blood culture  Gram negative- sensitivity pending. Repeated blood cultures yesterday thus far no growth.    5/22 pt is doing well on IV levaqiun. Last fever was evening of 5/20. Original blood cultures from ER still showing one no growth and one with ecoli- sensitivity pending. Repeat blood cultures from 5/20 NGTD x 2. Urine grew back multiple organisms.     Review of Systems   Constitutional: Negative for activity change, fever and unexpected weight change.   HENT: Negative for congestion, ear pain, hearing loss, rhinorrhea and sore throat.    Eyes: Negative for pain, redness and visual disturbance.   Respiratory: Negative for cough, shortness of breath and wheezing.    Cardiovascular: Negative for chest pain,  palpitations and leg swelling.   Gastrointestinal: Negative for abdominal pain, constipation, diarrhea, nausea and vomiting.   Genitourinary: Negative for decreased urine volume, dysuria, frequency and urgency.   Musculoskeletal: Negative for back pain, joint swelling and neck pain.   Skin: Negative for color change, rash and wound.   Neurological: Negative for dizziness, tremors, weakness, light-headedness and headaches.     Objective:     Vital Signs (Most Recent):  Temp: 97.6 °F (36.4 °C) (05/22/19 0738)  Pulse: 60 (05/22/19 0738)  Resp: 18 (05/22/19 0738)  BP: (!) 163/70 (05/22/19 0738)  SpO2: (!) 94 % (05/22/19 0738) Vital Signs (24h Range):  Temp:  [97.4 °F (36.3 °C)-99.4 °F (37.4 °C)] 97.6 °F (36.4 °C)  Pulse:  [59-65] 60  Resp:  [18] 18  SpO2:  [94 %-99 %] 94 %  BP: (136-163)/(63-72) 163/70     Weight: 79.1 kg (174 lb 6.1 oz)  Body mass index is 28.15 kg/m².    Physical Exam   Constitutional: He is oriented to person, place, and time. He appears well-developed and well-nourished. No distress.   HENT:   Head: Normocephalic and atraumatic.   Right Ear: External ear normal.   Left Ear: External ear normal.   Eyes: Pupils are equal, round, and reactive to light. Conjunctivae and EOM are normal. Right eye exhibits no discharge. Left eye exhibits no discharge.   Neck: Neck supple. No tracheal deviation present.   Cardiovascular: Normal rate and regular rhythm.   No murmur heard.  Pulmonary/Chest: Effort normal and breath sounds normal. No respiratory distress. He has no wheezes.   Abdominal: Soft. Bowel sounds are normal. He exhibits no distension. There is no tenderness. There is no rebound and no guarding.   Musculoskeletal: He exhibits no edema.   Neurological: He is alert and oriented to person, place, and time. No cranial nerve deficit.   Skin: Skin is warm and dry.   Psychiatric: He has a normal mood and affect. His behavior is normal.   Nursing note and vitals reviewed.        CRANIAL NERVES     CN III, IV,  VI   Pupils are equal, round, and reactive to light.  Extraocular motions are normal.        Significant Labs:   CBC:   Recent Labs   Lab 05/20/19  0940 05/21/19  0611 05/22/19  0607   WBC 5.86 6.04 5.67   HGB 10.6* 11.0* 11.5*   HCT 31.3* 32.8* 33.9*    241 268     CMP:   Recent Labs   Lab 05/20/19  0940 05/21/19  0611 05/22/19  0607    142 142   K 3.6 3.7 3.7    109 107   CO2 26 27 28   * 103 107   BUN 15 9 12   CREATININE 1.1 0.9 1.0   CALCIUM 8.7 8.9 9.0   PROT 6.0 5.8* 6.1   ALBUMIN 2.9* 2.8* 2.8*   BILITOT 0.4 0.4 0.3   ALKPHOS 51* 51* 63   AST 33 29 28   ALT 27 29 35   ANIONGAP 5* 6* 7*   EGFRNONAA >60 >60 >60     Lab Results   Component Value Date    DDIMER 1.52 (H) 05/19/2019 5/19 Blood culture x 2-one no growth    One with gram negative e coli sensitivity pending  5/20 repeat blood cultures x 2 NGTD    urine culture in process- multiple organisms     CXR Left cardiac pacer and prior sternotomy.  No acute findings    US lower legs No evidence of deep venous thrombosis in the left lower extremity.    EKG Normal sinus rhythm  Left anterior fascicular block  Right bundle branch block      Assessment/Plan:      * Bacteremia due to Gram-negative bacteria  Admitted due to positive blood cx obtained in ER  Cont IV levquin and follow blood cultures gram negative with sensitivity pending - sensitivity shows resistance to levaquin change to rocephin today, give today and tomorrow am and d/c on augmenting x 12 days  Repeat blood cultures 5/20/19- ths far no growth  Last temp 7pm 5/20- will need to be 48hr afebrile on IV abx prior to d/c. Will need 14 days abx total.  On Levaquin dose 4 today IV---Cx just resulted, he is resistant to this. Interesting that he improved on it tho(maybe bacteriostatic but not bacteriocidal). Will change to Rocephin and plan to d/c home on augmentin tomorrow if he is afebrile up until tonight(48 hours)    Essential hypertension  bp has been running high. He is  currently on his home doses of medication. Increase losartan from 50mg>100mg  //70        Acute cystitis without hematuria  The etiology of his bacteremia  Levaquin, follow up cultures change to rocephin due to culture results  Sees Dr Guo in [facility] for urology .      Coronary artery disease involving coronary bypass graft of native heart without angina pectoris  Cont isosorbide, statin, asa,  losartan and renexa.      CML (chronic myelocytic leukemia)  Cont gleevec  Monitor counts; stable.    Hypothyroidism due to acquired atrophy of thyroid  Cont levothyroxine.      Chronic urethral stricture  Self caths as needed at home        VTE Risk Mitigation (From admission, onward)        Ordered     Place sequential compression device  Until discontinued      05/20/19 0932     IP VTE LOW RISK PATIENT  Once      05/20/19 0046     Place VERONICA hose  Until discontinued      05/20/19 0046              Alex Mcallister MD  Department of Hospital Medicine   [facility]

## 2019-05-22 NOTE — ASSESSMENT & PLAN NOTE
Admitted due to positive blood cx obtained in ER  Cont IV levquin and follow blood cultures gram negative with sensitivity pending - sensitivity shows resistance to levaquin change to rocephin today, give today and tomorrow am and d/c on augmenting x 12 days  Repeat blood cultures 5/20/19- ths far no growth  Last temp 7pm 5/20- will need to be 48hr afebrile on IV abx prior to d/c. Will need 14 days abx total.  On Levaquin dose 4 today IV---Cx just resulted, he is resistant to this. Interesting that he improved on it tho(maybe bacteriostatic but not bacteriocidal). Will change to Rocephin and plan to d/c home on augmentin tomorrow if he is afebrile up until tonight(48 hours)

## 2019-05-23 VITALS
DIASTOLIC BLOOD PRESSURE: 65 MMHG | WEIGHT: 174.38 LBS | SYSTOLIC BLOOD PRESSURE: 132 MMHG | HEART RATE: 65 BPM | TEMPERATURE: 97 F | HEIGHT: 66 IN | BODY MASS INDEX: 28.03 KG/M2 | OXYGEN SATURATION: 99 % | RESPIRATION RATE: 17 BRPM

## 2019-05-23 LAB
ALBUMIN SERPL BCP-MCNC: 3 G/DL (ref 3.5–5.2)
ALP SERPL-CCNC: 71 U/L (ref 55–135)
ALT SERPL W/O P-5'-P-CCNC: 34 U/L (ref 10–44)
ANION GAP SERPL CALC-SCNC: 8 MMOL/L (ref 8–16)
AST SERPL-CCNC: 25 U/L (ref 10–40)
BASOPHILS # BLD AUTO: 0.03 K/UL (ref 0–0.2)
BASOPHILS NFR BLD: 0.5 % (ref 0–1.9)
BILIRUB SERPL-MCNC: 0.3 MG/DL (ref 0.1–1)
BUN SERPL-MCNC: 16 MG/DL (ref 8–23)
CALCIUM SERPL-MCNC: 9.1 MG/DL (ref 8.7–10.5)
CHLORIDE SERPL-SCNC: 106 MMOL/L (ref 95–110)
CO2 SERPL-SCNC: 28 MMOL/L (ref 23–29)
CREAT SERPL-MCNC: 0.9 MG/DL (ref 0.5–1.4)
DIFFERENTIAL METHOD: ABNORMAL
EOSINOPHIL # BLD AUTO: 0.2 K/UL (ref 0–0.5)
EOSINOPHIL NFR BLD: 3.6 % (ref 0–8)
ERYTHROCYTE [DISTWIDTH] IN BLOOD BY AUTOMATED COUNT: 14.2 % (ref 11.5–14.5)
EST. GFR  (AFRICAN AMERICAN): >60 ML/MIN/1.73 M^2
EST. GFR  (NON AFRICAN AMERICAN): >60 ML/MIN/1.73 M^2
GLUCOSE SERPL-MCNC: 103 MG/DL (ref 70–110)
HCT VFR BLD AUTO: 34.8 % (ref 40–54)
HGB BLD-MCNC: 11.8 G/DL (ref 14–18)
LYMPHOCYTES # BLD AUTO: 2 K/UL (ref 1–4.8)
LYMPHOCYTES NFR BLD: 33.7 % (ref 18–48)
MCH RBC QN AUTO: 33.8 PG (ref 27–31)
MCHC RBC AUTO-ENTMCNC: 33.9 G/DL (ref 32–36)
MCV RBC AUTO: 100 FL (ref 82–98)
MONOCYTES # BLD AUTO: 0.6 K/UL (ref 0.3–1)
MONOCYTES NFR BLD: 10 % (ref 4–15)
NEUTROPHILS # BLD AUTO: 3.2 K/UL (ref 1.8–7.7)
NEUTROPHILS NFR BLD: 52.2 % (ref 38–73)
PLATELET # BLD AUTO: 301 K/UL (ref 150–350)
PMV BLD AUTO: 10 FL (ref 9.2–12.9)
POTASSIUM SERPL-SCNC: 3.5 MMOL/L (ref 3.5–5.1)
PROT SERPL-MCNC: 6.5 G/DL (ref 6–8.4)
RBC # BLD AUTO: 3.49 M/UL (ref 4.6–6.2)
SODIUM SERPL-SCNC: 142 MMOL/L (ref 136–145)
WBC # BLD AUTO: 6.03 K/UL (ref 3.9–12.7)

## 2019-05-23 PROCEDURE — 99238 PR HOSPITAL DISCHARGE DAY,<30 MIN: ICD-10-PCS | Mod: HCNC,,, | Performed by: FAMILY MEDICINE

## 2019-05-23 PROCEDURE — 80053 COMPREHEN METABOLIC PANEL: CPT | Mod: HCNC

## 2019-05-23 PROCEDURE — 25000003 PHARM REV CODE 250: Mod: HCNC | Performed by: NURSE PRACTITIONER

## 2019-05-23 PROCEDURE — 85025 COMPLETE CBC W/AUTO DIFF WBC: CPT | Mod: HCNC

## 2019-05-23 PROCEDURE — 36415 COLL VENOUS BLD VENIPUNCTURE: CPT | Mod: HCNC

## 2019-05-23 PROCEDURE — 99238 HOSP IP/OBS DSCHRG MGMT 30/<: CPT | Mod: HCNC,,, | Performed by: FAMILY MEDICINE

## 2019-05-23 PROCEDURE — 94760 N-INVAS EAR/PLS OXIMETRY 1: CPT | Mod: HCNC

## 2019-05-23 PROCEDURE — 25000003 PHARM REV CODE 250: Mod: HCNC | Performed by: INTERNAL MEDICINE

## 2019-05-23 PROCEDURE — 63600175 PHARM REV CODE 636 W HCPCS: Mod: HCNC | Performed by: NURSE PRACTITIONER

## 2019-05-23 RX ORDER — LOSARTAN POTASSIUM 100 MG/1
100 TABLET ORAL DAILY
Qty: 30 TABLET | Refills: 0 | Status: SHIPPED | OUTPATIENT
Start: 2019-05-24 | End: 2020-05-04 | Stop reason: ALTCHOICE

## 2019-05-23 RX ORDER — AMOXICILLIN AND CLAVULANATE POTASSIUM 875; 125 MG/1; MG/1
1 TABLET, FILM COATED ORAL EVERY 12 HOURS
Qty: 24 TABLET | Refills: 0 | Status: SHIPPED | OUTPATIENT
Start: 2019-05-23 | End: 2019-06-04

## 2019-05-23 RX ADMIN — RANOLAZINE 500 MG: 500 TABLET, FILM COATED, EXTENDED RELEASE ORAL at 08:05

## 2019-05-23 RX ADMIN — ASPIRIN 81 MG: 81 TABLET, COATED ORAL at 08:05

## 2019-05-23 RX ADMIN — CEFTRIAXONE 1 G: 1 INJECTION, SOLUTION INTRAVENOUS at 10:05

## 2019-05-23 RX ADMIN — LEVOTHYROXINE SODIUM 100 MCG: 100 TABLET ORAL at 06:05

## 2019-05-23 RX ADMIN — ISOSORBIDE MONONITRATE 30 MG: 30 TABLET, EXTENDED RELEASE ORAL at 08:05

## 2019-05-23 RX ADMIN — FOLIC ACID TAB 400 MCG 400 MCG: 400 TAB at 08:05

## 2019-05-23 RX ADMIN — LOSARTAN POTASSIUM 100 MG: 50 TABLET, FILM COATED ORAL at 08:05

## 2019-05-23 NOTE — ASSESSMENT & PLAN NOTE
The etiology of his bacteremia  Levaquin, follow up cultures change to rocephin due to culture results  Sees Dr Guo in [facility] for urology .

## 2019-05-23 NOTE — DISCHARGE SUMMARY
[facility] Medicine  Discharge Summary      Patient Name: James Quan Jr.  MRN: [id number]  Admission Date: 5/19/2019  Hospital Length of Stay: 4 days  Discharge Date and Time:  05/23/2019 9:26 AM  Attending Physician: Sandor Hernandez MD   Discharging Provider: Concepcion Ballesteros NP  Primary Care Provider: Alex Mcallister MD      HPI:   74yo male patient admitted to floor from ER. Presented yesterday with UTI and sent home with cipro po. He reports that he was having chills and fever at home that started Sunday AM. He denies dysuria or urinary frequency/difficulty. Did report back pain associated. Bilateral lower back pain but thought that was from working in yard. Called back from ER for positive blood cultures. T max 102.9 , but last temp recorded 101 5/19 620 . No elevated WBC. On levaquin 750mgIV. Pt does have hx of prostate ca s/p TURP. Does occasionally self cath at home. At most once per week. Last cath was 3 weeks ago.     Pt has hx of CMLon gleevec, CAD/CHF/HTN/HLD/ hypothyroidism.     * No surgery found *      Hospital Course:   Pt doing well. He is on IV levaquin day 3 today. Last temp recorded 100.8 1722 5/20/19. No elevated WBC. Urine culture resulted multiple organisms. Blood culture  Gram negative- sensitivity pending. Repeated blood cultures yesterday thus far no growth.    5/22 pt is doing well on IV levaqiun. Last fever was evening of 5/20. Original blood cultures from ER still showing one no growth and one with ecoli- sensitivity pending. Repeat blood cultures from 5/20 NGTD x 2. Urine grew back multiple organisms.     5/23 Pt was changed to rocephin yesterday as culture sensitivity was reviewed. Will get second dose today and he is sensitive to po augmentin. Will d/c today as he has not had fever since 5pm 5/20     Consults:     * Bacteremia due to Gram-negative bacteria  Admitted due to positive blood cx obtained in ER  Cont IV levquin and follow  blood cultures gram negative with sensitivity pending - sensitivity shows resistance to levaquin change to rocephin today, give today and tomorrow am and d/c on augmenting x 12 days  Repeat blood cultures 5/20/19- ths far no growth  Last temp 7pm 5/20- will need to be 48hr afebrile on IV abx prior to d/c. Will need 14 days abx total.  On Levaquin dose 4 today IV---Cx just resulted, he is resistant to this. Interesting that he improved on it tho(maybe bacteriostatic but not bacteriocidal). Will change to Rocephin and plan to d/c home on augmentin tomorrow if he is afebrile up until tonight(48 hours)    D/c today after rocephin and rx bedside delivery augmentin    Essential hypertension  bp has been running high. He is currently on his home doses of medication. Increase losartan from 50mg>100mg  //70        Acute cystitis without hematuria  The etiology of his bacteremia  Levaquin, follow up cultures change to rocephin due to culture results  Sees Dr Guo in [facility] for urology .      Coronary artery disease involving coronary bypass graft of native heart without angina pectoris  Cont isosorbide, statin, asa,  losartan and renexa.      CML (chronic myelocytic leukemia)  Cont gleevec  Monitor counts; stable.    Hypothyroidism due to acquired atrophy of thyroid  Cont levothyroxine.        Final Active Diagnoses:    Diagnosis Date Noted POA    PRINCIPAL PROBLEM:  Bacteremia due to Gram-negative bacteria [R78.81] 05/19/2019 Yes    Essential hypertension [I10] 05/22/2019 Yes    Acute cystitis without hematuria [N30.00] 05/20/2019 Yes    Coronary artery disease involving coronary bypass graft of native heart without angina pectoris [I25.810] 02/07/2017 Yes    CML (chronic myelocytic leukemia) [C92.10] 03/15/2016 Yes    Hypothyroidism due to acquired atrophy of thyroid [E03.4] 03/15/2016 Yes      Problems Resolved During this Admission:       Discharged Condition: good    Disposition: Home or Self  Care    Follow Up:  Follow-up Information     Alex Mcallister MD In 1 week.    Specialty:  Family Medicine  Contact information:  [address]  [facility] [id number] [phone]                 Patient Instructions:   No discharge procedures on file.    Significant Diagnostic Studies:       Significant Labs:   CBC:   Recent Labs   Lab 05/22/19  0607 05/23/19  0616   WBC 5.67 6.03   HGB 11.5* 11.8*   HCT 33.9* 34.8*    301     CMP:   Recent Labs   Lab 05/22/19  0607 05/23/19  0616    142   K 3.7 3.5    106   CO2 28 28    103   BUN 12 16   CREATININE 1.0 0.9   CALCIUM 9.0 9.1   PROT 6.1 6.5   ALBUMIN 2.8* 3.0*   BILITOT 0.3 0.3   ALKPHOS 63 71   AST 28 25   ALT 35 34   ANIONGAP 7* 8   EGFRNONAA >60 >60     Lab Results   Component Value Date    DDIMER 1.52 (H) 05/19/2019 5/19 Blood culture x 2-one no growth      Escherichia coli       CULTURE, BLOOD     Amox/K Clav'ate <=8/4 mcg/mL Sensitive     Amp/Sulbactam <=4/2 mcg/mL Sensitive     Ampicillin <=2 mcg/mL Sensitive     Cefazolin <=2 mcg/mL Sensitive     Ceftriaxone <=1 mcg/mL Sensitive     Ciprofloxacin >2 mcg/mL Resistant     Ertapenem <=0.5 mcg/mL Sensitive     Gentamicin <=1 mcg/mL Sensitive     Levofloxacin >4 mcg/mL Resistant     Meropenem <=1 mcg/mL Sensitive     Piperacillin/Tazo <=8 mcg/mL Sensitive     Tetracycline >8 mcg/mL Resistant     Tobramycin <=2 mcg/mL Sensitive     Trimeth/Sulfa 1/19 mcg/mL Sensitive        5/20 repeat blood cultures x 2 NGTD    urine culture in process- multiple organisms     CXR Left cardiac pacer and prior sternotomy.  No acute findings    US lower legs No evidence of deep venous thrombosis in the left lower extremity.    EKG Normal sinus rhythm  Left anterior fascicular block  Right bundle branch block      Pending Diagnostic Studies:     None         Medications:  Reconciled Home Medications:      Medication List      START taking these medications     amoxicillin-clavulanate 875-125mg 875-125 mg per tablet  Commonly known as:  AUGMENTIN  Take 1 tablet by mouth every 12 (twelve) hours. for 12 days        CHANGE how you take these medications    catheter 14 Fr Misc  Commonly known as:  BARD RUBBER UTILITY CATHETER  1 Units by Misc.(Non-Drug; Combo Route) route every 7 days.  What changed:  when to take this     losartan 100 MG tablet  Commonly known as:  COZAAR  Take 1 tablet (100 mg total) by mouth once daily.  Start taking on:  5/24/2019  What changed:    · medication strength  · how much to take        CONTINUE taking these medications    aspirin 81 MG EC tablet  Commonly known as:  ECOTRIN  Take 81 mg by mouth once daily.     atorvastatin 40 MG tablet  Commonly known as:  LIPITOR  Take 40 mg by mouth once daily.     b complex vitamins tablet  Take 1 tablet by mouth once daily.     beta carotene 94538 UNIT capsule  Take 10,000 Units by mouth once daily.     cyanocobalamin 1000 MCG tablet  Commonly known as:  VITAMIN B-12  Take 1,000 mcg by mouth nightly.     DHEA ORAL  Take 25 mg by mouth once daily. 50 mg. tablet     ferrous sulfate 325 (65 FE) MG EC tablet  Take 325 mg by mouth once daily.     fish oil-omega-3 fatty acids 300-1,000 mg capsule  Take by mouth once daily.     folic acid 400 MCG tablet  Commonly known as:  FOLVITE  Take 400 mcg by mouth once daily.     GLEEVEC 400 MG Tab  Generic drug:  imatinib  400 mg once daily.     isosorbide mononitrate 30 MG 24 hr tablet  Commonly known as:  IMDUR  Take 30 mg by mouth once daily.     levothyroxine 100 MCG tablet  Commonly known as:  SYNTHROID  TAKE 1 TABLET EVERY DAY     lycopene 10 mg Cap  Take 1 capsule by mouth nightly.     NITROSTAT 0.4 MG SL tablet  Generic drug:  nitroGLYCERIN  Place 0.4 mg under the tongue every 5 (five) minutes as needed.     RANEXA 1,000 mg Tb12  Generic drug:  ranolazine  Take 500 mg by mouth once daily.     VITAMIN C 100 MG tablet  Generic drug:  ascorbic acid (vitamin C)  Take  100 mg by mouth once daily.     vitamin D 1000 units Tab  Commonly known as:  VITAMIN D3  Take 2,000 Units by mouth once daily.        STOP taking these medications    ciprofloxacin HCl 500 MG tablet  Commonly known as:  CIPRO            Indwelling Lines/Drains at time of discharge:   Lines/Drains/Airways          None          Time spent on the discharge of patient: 20 minutes  Patient was seen and examined on the date of discharge and determined to be suitable for discharge.         Concepcion Ballesteros NP  Department of Hospital Medicine  [facility]

## 2019-05-23 NOTE — PLAN OF CARE
Problem: Adult Inpatient Plan of Care  Goal: Plan of Care Review  Outcome: Ongoing (interventions implemented as appropriate)  Patient up ad lisa. Afebrile. Safety and comfort maintained. Denies needs. Agrees with plan of care.

## 2019-05-23 NOTE — ASSESSMENT & PLAN NOTE
Admitted due to positive blood cx obtained in ER  Cont IV levquin and follow blood cultures gram negative with sensitivity pending - sensitivity shows resistance to levaquin change to rocephin today, give today and tomorrow am and d/c on augmenting x 12 days  Repeat blood cultures 5/20/19- ths far no growth  Last temp 7pm 5/20- will need to be 48hr afebrile on IV abx prior to d/c. Will need 14 days abx total.  On Levaquin dose 4 today IV---Cx just resulted, he is resistant to this. Interesting that he improved on it tho(maybe bacteriostatic but not bacteriocidal). Will change to Rocephin and plan to d/c home on augmentin tomorrow if he is afebrile up until tonight(48 hours)    D/c today after rocephin and rx bedside delivery augmentin

## 2019-05-23 NOTE — ASSESSMENT & PLAN NOTE
The etiology of his bacteremia  Levaquin, follow up cultures change to rocephin due to culture results  Sees Dr Guo in [facility] for urology .

## 2019-05-23 NOTE — PROGRESS NOTES
[facility] Medicine  Progress Note    Patient Name: James Quan Jr.  MRN: [id number]  Patient Class: IP- Inpatient   Admission Date: 5/19/2019  Length of Stay: 4 days  Attending Physician: No att. providers found  Primary Care Provider: Alex Mcallister MD        Subjective:     Principal Problem:Bacteremia due to Gram-negative bacteria    HPI:  74yo male patient admitted to floor from ER. Presented yesterday with UTI and sent home with cipro po. He reports that he was having chills and fever at home that started Sunday AM. He denies dysuria or urinary frequency/difficulty. Did report back pain associated. Bilateral lower back pain but thought that was from working in yard. Called back from ER for positive blood cultures. T max 102.9 , but last temp recorded 101 5/19 620 . No elevated WBC. On levaquin 750mgIV. Pt does have hx of prostate ca s/p TURP. Does occasionally self cath at home. At most once per week. Last cath was 3 weeks ago.     Pt has hx of CMLon gleevec, CAD/CHF/HTN/HLD/ hypothyroidism.     Hospital Course:  Pt doing well. He is on IV levaquin day 3 today. Last temp recorded 100.8 1722 5/20/19. No elevated WBC. Urine culture resulted multiple organisms. Blood culture  Gram negative- sensitivity pending. Repeated blood cultures yesterday thus far no growth.    5/22 pt is doing well on IV levaqiun. Last fever was evening of 5/20. Original blood cultures from ER still showing one no growth and one with ecoli- sensitivity pending. Repeat blood cultures from 5/20 NGTD x 2. Urine grew back multiple organisms.     5/23 Pt was changed to rocephin yesterday as culture sensitivity was reviewed. Will get second dose today and he is sensitive to po augmentin. Will d/c today as he has not had fever since 5pm 5/20    Review of Systems   Constitutional: Negative for activity change, fever and unexpected weight change.   HENT: Negative for congestion, ear pain, hearing loss,  rhinorrhea and sore throat.    Eyes: Negative for pain, redness and visual disturbance.   Respiratory: Negative for cough, shortness of breath and wheezing.    Cardiovascular: Negative for chest pain, palpitations and leg swelling.   Gastrointestinal: Negative for abdominal pain, constipation, diarrhea, nausea and vomiting.   Genitourinary: Negative for decreased urine volume, dysuria, frequency and urgency.   Musculoskeletal: Negative for back pain, joint swelling and neck pain.   Skin: Negative for color change, rash and wound.   Neurological: Negative for dizziness, tremors, weakness, light-headedness and headaches.     Objective:     Vital Signs (Most Recent):  Temp: 97.4 °F (36.3 °C) (05/23/19 0832)  Pulse: 60 (05/23/19 0832)  Resp: 17 (05/23/19 0832)  BP: (!) 154/67 (05/23/19 0832)  SpO2: 98 % (05/23/19 0832) Vital Signs (24h Range):  Temp:  [97.1 °F (36.2 °C)-99.4 °F (37.4 °C)] 97.4 °F (36.3 °C)  Pulse:  [60-73] 60  Resp:  [17-20] 17  SpO2:  [96 %-100 %] 98 %  BP: (122-156)/(64-75) 154/67     Weight: 79.1 kg (174 lb 6.1 oz)  Body mass index is 28.15 kg/m².    Physical Exam   Constitutional: He is oriented to person, place, and time. He appears well-developed and well-nourished. No distress.   HENT:   Head: Normocephalic and atraumatic.   Right Ear: External ear normal.   Left Ear: External ear normal.   Eyes: Pupils are equal, round, and reactive to light. Conjunctivae and EOM are normal. Right eye exhibits no discharge. Left eye exhibits no discharge.   Neck: Neck supple. No tracheal deviation present.   Cardiovascular: Normal rate and regular rhythm.   No murmur heard.  Pulmonary/Chest: Effort normal and breath sounds normal. No respiratory distress. He has no wheezes.   Abdominal: Soft. Bowel sounds are normal. He exhibits no distension. There is no tenderness. There is no rebound and no guarding.   Musculoskeletal: He exhibits no edema.   Neurological: He is alert and oriented to person, place, and time.  No cranial nerve deficit.   Skin: Skin is warm and dry.   Psychiatric: He has a normal mood and affect. His behavior is normal.   Nursing note and vitals reviewed.        CRANIAL NERVES     CN III, IV, VI   Pupils are equal, round, and reactive to light.  Extraocular motions are normal.        Significant Labs:   CBC:   Recent Labs   Lab 05/22/19  0607 05/23/19 0616   WBC 5.67 6.03   HGB 11.5* 11.8*   HCT 33.9* 34.8*    301     CMP:   Recent Labs   Lab 05/22/19  0607 05/23/19 0616    142   K 3.7 3.5    106   CO2 28 28    103   BUN 12 16   CREATININE 1.0 0.9   CALCIUM 9.0 9.1   PROT 6.1 6.5   ALBUMIN 2.8* 3.0*   BILITOT 0.3 0.3   ALKPHOS 63 71   AST 28 25   ALT 35 34   ANIONGAP 7* 8   EGFRNONAA >60 >60     Lab Results   Component Value Date    DDIMER 1.52 (H) 05/19/2019 5/19 Blood culture x 2-one no growth      Escherichia coli       CULTURE, BLOOD     Amox/K Clav'ate <=8/4 mcg/mL Sensitive     Amp/Sulbactam <=4/2 mcg/mL Sensitive     Ampicillin <=2 mcg/mL Sensitive     Cefazolin <=2 mcg/mL Sensitive     Ceftriaxone <=1 mcg/mL Sensitive     Ciprofloxacin >2 mcg/mL Resistant     Ertapenem <=0.5 mcg/mL Sensitive     Gentamicin <=1 mcg/mL Sensitive     Levofloxacin >4 mcg/mL Resistant     Meropenem <=1 mcg/mL Sensitive     Piperacillin/Tazo <=8 mcg/mL Sensitive     Tetracycline >8 mcg/mL Resistant     Tobramycin <=2 mcg/mL Sensitive     Trimeth/Sulfa 1/19 mcg/mL Sensitive        5/20 repeat blood cultures x 2 NGTD    urine culture in process- multiple organisms     CXR Left cardiac pacer and prior sternotomy.  No acute findings    US lower legs No evidence of deep venous thrombosis in the left lower extremity.    EKG Normal sinus rhythm  Left anterior fascicular block  Right bundle branch block      Assessment/Plan:      * Bacteremia due to Gram-negative bacteria  Admitted due to positive blood cx obtained in ER  Cont IV levquin and follow blood cultures gram negative with sensitivity  pending - sensitivity shows resistance to levaquin change to rocephin today, give today and tomorrow am and d/c on augmenting x 12 days  Repeat blood cultures 5/20/19- ths far no growth  Last temp 7pm 5/20- will need to be 48hr afebrile on IV abx prior to d/c. Will need 14 days abx total.  On Levaquin dose 4 today IV---Cx just resulted, he is resistant to this. Interesting that he improved on it tho(maybe bacteriostatic but not bacteriocidal). Will change to Rocephin and plan to d/c home on augmentin tomorrow if he is afebrile up until tonight(48 hours)    D/c today after rocephin and rx bedside delivery augmentin    Essential hypertension  bp has been running high. He is currently on his home doses of medication. Increase losartan from 50mg>100mg  //70        Acute cystitis without hematuria  The etiology of his bacteremia  Levaquin, follow up cultures change to rocephin due to culture results  Sees Dr Guo in [place] for urology .      Coronary artery disease involving coronary bypass graft of native heart without angina pectoris  Cont isosorbide, statin, asa,  losartan and renexa.      CML (chronic myelocytic leukemia)  Cont gleevec  Monitor counts; stable.    Hypothyroidism due to acquired atrophy of thyroid  Cont levothyroxine.      Chronic urethral stricture  Self caths as needed at home        VTE Risk Mitigation (From admission, onward)        Ordered     IP VTE LOW RISK PATIENT  Once      05/20/19 0046              Roz Fulton MD  Department of Hospital Medicine   [facility]

## 2019-05-23 NOTE — PLAN OF CARE
05/23/19 1000   Medicare Message   Important Message from Medicare regarding Discharge Appeal Rights Given to patient/caregiver;Explained to patient/caregiver;Signed/date by patient/caregiver   Date IMM was signed 05/23/19   Time IMM was signed 0922         Signed for discharge. Copy given to patient.

## 2019-05-23 NOTE — PLAN OF CARE
05/23/19 1001   Final Note   Assessment Type Final Discharge Note   Anticipated Discharge Disposition Home   What phone number can be called within the next 1-3 days to see how you are doing after discharge? [phone]   Hospital Follow Up  Appt(s) scheduled? Yes   Discharge plans and expectations educations in teach back method with documentation complete? Yes   Right Care Referral Info   Post Acute Recommendation No Care           Patient will discharge home today. Medications will be delivered via bedside delivery. Patient reminded about what signs and symptoms to look for when discharged, and educated that if any symptoms return, make a same day appointment with PCP or come back to the ED. Patient states understanding and agreement. Patient denies any other needs or concerns for discharge.

## 2019-05-23 NOTE — PROGRESS NOTES
Nursing Notes  Bedside handoff completed with Caitlin zaldivar RN. Patient sitting up in bed. No complaints. Denies needs. Will continue to monitor. Call bell with reach of patient.       Huddle Comments

## 2019-05-23 NOTE — PLAN OF CARE
Problem: Adult Inpatient Plan of Care  Goal: Plan of Care Review  Outcome: Ongoing (interventions implemented as appropriate)  Patient rested well throughout shift. Room air. Ck hose on. Alert and oriented. Neuro intact. Patient denies pain or shortness of breath. IV antibiotics continued. Free from fall or injury. Plan of care reviewed with patient.

## 2019-05-23 NOTE — PROGRESS NOTES
Staff Handoff  Bedside report per SHERRY Napier. No distress noted. Room air. Awake, alert, oriented. Ck hose on. Daughter at bedside. Denies pain. Call bell in reach. Encouraged to call for assistance.       Resident Handoff

## 2019-05-23 NOTE — SUBJECTIVE & OBJECTIVE
Review of Systems   Constitutional: Negative for activity change, fever and unexpected weight change.   HENT: Negative for congestion, ear pain, hearing loss, rhinorrhea and sore throat.    Eyes: Negative for pain, redness and visual disturbance.   Respiratory: Negative for cough, shortness of breath and wheezing.    Cardiovascular: Negative for chest pain, palpitations and leg swelling.   Gastrointestinal: Negative for abdominal pain, constipation, diarrhea, nausea and vomiting.   Genitourinary: Negative for decreased urine volume, dysuria, frequency and urgency.   Musculoskeletal: Negative for back pain, joint swelling and neck pain.   Skin: Negative for color change, rash and wound.   Neurological: Negative for dizziness, tremors, weakness, light-headedness and headaches.     Objective:     Vital Signs (Most Recent):  Temp: 97.4 °F (36.3 °C) (05/23/19 0832)  Pulse: 60 (05/23/19 0832)  Resp: 17 (05/23/19 0832)  BP: (!) 154/67 (05/23/19 0832)  SpO2: 98 % (05/23/19 0832) Vital Signs (24h Range):  Temp:  [97.1 °F (36.2 °C)-99.4 °F (37.4 °C)] 97.4 °F (36.3 °C)  Pulse:  [60-73] 60  Resp:  [17-20] 17  SpO2:  [96 %-100 %] 98 %  BP: (122-156)/(64-75) 154/67     Weight: 79.1 kg (174 lb 6.1 oz)  Body mass index is 28.15 kg/m².    Physical Exam   Constitutional: He is oriented to person, place, and time. He appears well-developed and well-nourished. No distress.   HENT:   Head: Normocephalic and atraumatic.   Right Ear: External ear normal.   Left Ear: External ear normal.   Eyes: Pupils are equal, round, and reactive to light. Conjunctivae and EOM are normal. Right eye exhibits no discharge. Left eye exhibits no discharge.   Neck: Neck supple. No tracheal deviation present.   Cardiovascular: Normal rate and regular rhythm.   No murmur heard.  Pulmonary/Chest: Effort normal and breath sounds normal. No respiratory distress. He has no wheezes.   Abdominal: Soft. Bowel sounds are normal. He exhibits no distension. There is no  tenderness. There is no rebound and no guarding.   Musculoskeletal: He exhibits no edema.   Neurological: He is alert and oriented to person, place, and time. No cranial nerve deficit.   Skin: Skin is warm and dry.   Psychiatric: He has a normal mood and affect. His behavior is normal.   Nursing note and vitals reviewed.        CRANIAL NERVES     CN III, IV, VI   Pupils are equal, round, and reactive to light.  Extraocular motions are normal.        Significant Labs:   CBC:   Recent Labs   Lab 05/22/19  0607 05/23/19 0616   WBC 5.67 6.03   HGB 11.5* 11.8*   HCT 33.9* 34.8*    301     CMP:   Recent Labs   Lab 05/22/19  0607 05/23/19 0616    142   K 3.7 3.5    106   CO2 28 28    103   BUN 12 16   CREATININE 1.0 0.9   CALCIUM 9.0 9.1   PROT 6.1 6.5   ALBUMIN 2.8* 3.0*   BILITOT 0.3 0.3   ALKPHOS 63 71   AST 28 25   ALT 35 34   ANIONGAP 7* 8   EGFRNONAA >60 >60     Lab Results   Component Value Date    DDIMER 1.52 (H) 05/19/2019 5/19 Blood culture x 2-one no growth      Escherichia coli       CULTURE, BLOOD     Amox/K Clav'ate <=8/4 mcg/mL Sensitive     Amp/Sulbactam <=4/2 mcg/mL Sensitive     Ampicillin <=2 mcg/mL Sensitive     Cefazolin <=2 mcg/mL Sensitive     Ceftriaxone <=1 mcg/mL Sensitive     Ciprofloxacin >2 mcg/mL Resistant     Ertapenem <=0.5 mcg/mL Sensitive     Gentamicin <=1 mcg/mL Sensitive     Levofloxacin >4 mcg/mL Resistant     Meropenem <=1 mcg/mL Sensitive     Piperacillin/Tazo <=8 mcg/mL Sensitive     Tetracycline >8 mcg/mL Resistant     Tobramycin <=2 mcg/mL Sensitive     Trimeth/Sulfa 1/19 mcg/mL Sensitive        5/20 repeat blood cultures x 2 NGTD    urine culture in process- multiple organisms     CXR Left cardiac pacer and prior sternotomy.  No acute findings    US lower legs No evidence of deep venous thrombosis in the left lower extremity.    EKG Normal sinus rhythm  Left anterior fascicular block  Right bundle branch block

## 2019-05-24 LAB — BACTERIA BLD CULT: NORMAL

## 2019-05-25 LAB
BACTERIA BLD CULT: NORMAL
BACTERIA BLD CULT: NORMAL

## 2019-06-03 ENCOUNTER — PES CALL (OUTPATIENT)
Dept: ADMINISTRATIVE | Facility: CLINIC | Age: 74
End: 2019-06-03

## 2019-06-20 ENCOUNTER — PES CALL (OUTPATIENT)
Dept: ADMINISTRATIVE | Facility: CLINIC | Age: 74
End: 2019-06-20

## 2019-08-20 ENCOUNTER — OFFICE VISIT (OUTPATIENT)
Dept: FAMILY MEDICINE | Facility: CLINIC | Age: 74
End: 2019-08-20
Payer: MEDICARE

## 2019-08-20 VITALS
WEIGHT: 167.38 LBS | SYSTOLIC BLOOD PRESSURE: 120 MMHG | DIASTOLIC BLOOD PRESSURE: 68 MMHG | HEART RATE: 72 BPM | HEIGHT: 66 IN | RESPIRATION RATE: 16 BRPM | BODY MASS INDEX: 26.9 KG/M2

## 2019-08-20 DIAGNOSIS — R31.9 URINARY TRACT INFECTION WITH HEMATURIA, SITE UNSPECIFIED: ICD-10-CM

## 2019-08-20 DIAGNOSIS — R10.32 LEFT GROIN PAIN: ICD-10-CM

## 2019-08-20 DIAGNOSIS — N39.0 URINARY TRACT INFECTION WITH HEMATURIA, SITE UNSPECIFIED: ICD-10-CM

## 2019-08-20 DIAGNOSIS — M75.52 BURSITIS OF LEFT SHOULDER: ICD-10-CM

## 2019-08-20 DIAGNOSIS — R82.90 FOUL SMELLING URINE: ICD-10-CM

## 2019-08-20 DIAGNOSIS — M25.512 ACUTE PAIN OF LEFT SHOULDER: Primary | ICD-10-CM

## 2019-08-20 LAB
BACTERIA SPEC CULT: NORMAL
BILIRUB SERPL-MCNC: NORMAL MG/DL
BLOOD URINE, POC: NORMAL
CASTS: NORMAL
COLOR, POC UA: NORMAL
CRYSTALS: NORMAL
GLUCOSE UR QL STRIP: NORMAL
KETONES UR QL STRIP: NORMAL
LEUKOCYTE ESTERASE URINE, POC: NORMAL
NITRITE, POC UA: NORMAL
PH, POC UA: 5
PROTEIN, POC: NORMAL
RBC CELLS COUNTED: NORMAL
SPECIFIC GRAVITY, POC UA: 1.01
UROBILINOGEN, POC UA: NORMAL
WHITE BLOOD CELLS: NORMAL

## 2019-08-20 PROCEDURE — 99214 PR OFFICE/OUTPT VISIT, EST, LEVL IV, 30-39 MIN: ICD-10-PCS | Mod: 25,HCNC,S$GLB, | Performed by: FAMILY MEDICINE

## 2019-08-20 PROCEDURE — 81001 POCT URINALYSIS, DIPSTICK OR TABLET REAGENT, AUTOMATED, WITH MICROSCOP: ICD-10-PCS | Mod: HCNC,S$GLB,, | Performed by: FAMILY MEDICINE

## 2019-08-20 PROCEDURE — 3074F SYST BP LT 130 MM HG: CPT | Mod: HCNC,CPTII,S$GLB, | Performed by: FAMILY MEDICINE

## 2019-08-20 PROCEDURE — 99214 OFFICE O/P EST MOD 30 MIN: CPT | Mod: 25,HCNC,S$GLB, | Performed by: FAMILY MEDICINE

## 2019-08-20 PROCEDURE — 20605 DRAIN/INJ JOINT/BURSA W/O US: CPT | Mod: HCNC,LT,S$GLB, | Performed by: FAMILY MEDICINE

## 2019-08-20 PROCEDURE — 3078F PR MOST RECENT DIASTOLIC BLOOD PRESSURE < 80 MM HG: ICD-10-PCS | Mod: HCNC,CPTII,S$GLB, | Performed by: FAMILY MEDICINE

## 2019-08-20 PROCEDURE — 87186 SC STD MICRODIL/AGAR DIL: CPT | Mod: HCNC

## 2019-08-20 PROCEDURE — 87086 URINE CULTURE/COLONY COUNT: CPT | Mod: HCNC

## 2019-08-20 PROCEDURE — 3078F DIAST BP <80 MM HG: CPT | Mod: HCNC,CPTII,S$GLB, | Performed by: FAMILY MEDICINE

## 2019-08-20 PROCEDURE — 81001 URINALYSIS AUTO W/SCOPE: CPT | Mod: HCNC,S$GLB,, | Performed by: FAMILY MEDICINE

## 2019-08-20 PROCEDURE — 1101F PT FALLS ASSESS-DOCD LE1/YR: CPT | Mod: HCNC,CPTII,S$GLB, | Performed by: FAMILY MEDICINE

## 2019-08-20 PROCEDURE — 3074F PR MOST RECENT SYSTOLIC BLOOD PRESSURE < 130 MM HG: ICD-10-PCS | Mod: HCNC,CPTII,S$GLB, | Performed by: FAMILY MEDICINE

## 2019-08-20 PROCEDURE — 1101F PR PT FALLS ASSESS DOC 0-1 FALLS W/OUT INJ PAST YR: ICD-10-PCS | Mod: HCNC,CPTII,S$GLB, | Performed by: FAMILY MEDICINE

## 2019-08-20 PROCEDURE — 87077 CULTURE AEROBIC IDENTIFY: CPT | Mod: 59,HCNC

## 2019-08-20 PROCEDURE — 99999 PR PBB SHADOW E&M-EST. PATIENT-LVL IV: CPT | Mod: PBBFAC,HCNC,, | Performed by: FAMILY MEDICINE

## 2019-08-20 PROCEDURE — 20605 PR DRAIN/INJECT INTERMEDIATE JOINT/BURSA: ICD-10-PCS | Mod: HCNC,LT,S$GLB, | Performed by: FAMILY MEDICINE

## 2019-08-20 PROCEDURE — 87088 URINE BACTERIA CULTURE: CPT | Mod: HCNC

## 2019-08-20 PROCEDURE — 99999 PR PBB SHADOW E&M-EST. PATIENT-LVL IV: ICD-10-PCS | Mod: PBBFAC,HCNC,, | Performed by: FAMILY MEDICINE

## 2019-08-20 RX ORDER — BUPIVACAINE HYDROCHLORIDE 5 MG/ML
1.5 INJECTION, SOLUTION EPIDURAL; INTRACAUDAL
Status: COMPLETED | OUTPATIENT
Start: 2019-08-20 | End: 2019-08-20

## 2019-08-20 RX ORDER — CEPHALEXIN 500 MG/1
500 CAPSULE ORAL EVERY 8 HOURS
Qty: 21 CAPSULE | Refills: 0 | Status: SHIPPED | OUTPATIENT
Start: 2019-08-20 | End: 2019-08-27

## 2019-08-20 RX ORDER — TRIAMCINOLONE ACETONIDE 40 MG/ML
60 INJECTION, SUSPENSION INTRA-ARTICULAR; INTRAMUSCULAR
Status: COMPLETED | OUTPATIENT
Start: 2019-08-20 | End: 2019-08-20

## 2019-08-20 RX ADMIN — TRIAMCINOLONE ACETONIDE 60 MG: 40 INJECTION, SUSPENSION INTRA-ARTICULAR; INTRAMUSCULAR at 01:08

## 2019-08-20 RX ADMIN — BUPIVACAINE HYDROCHLORIDE 7.5 MG: 5 INJECTION, SOLUTION EPIDURAL; INTRACAUDAL at 01:08

## 2019-08-20 NOTE — PROGRESS NOTES
Subjective:       Patient ID: James Quan Jr. is a 74 y.o. male.    Chief Complaint: pain in shoulder (left shoulder); Groin Pain; and Urinary Tract Infection    Pt is a 74 y.o. male who presents for evaluation and management of   Encounter Diagnosis   Name Primary?    Acute pain of left shoulder Yes   .noticed pain after lifting plywood above his head about a month ago   Groin pain for about a month, but had it previously in the early year. ED visit at that time did not reveal anything. Worse with standing and tuning to the left   Thinks he has a UTI     Doing well on current meds. Denies any side effects. Prevention is up to date.    Review of Systems   Constitutional: Negative for fever.   Respiratory: Negative for shortness of breath.    Cardiovascular: Negative for chest pain.   Gastrointestinal: Negative for anal bleeding and blood in stool.   Genitourinary: Negative for dysuria.   Neurological: Negative for dizziness and light-headedness.       Objective:      Physical Exam   Constitutional: He is oriented to person, place, and time. He appears well-developed and well-nourished.   HENT:   Head: Normocephalic and atraumatic.   Right Ear: External ear normal.   Left Ear: External ear normal.   Nose: Nose normal.   Mouth/Throat: Oropharynx is clear and moist.   Eyes: Pupils are equal, round, and reactive to light. Conjunctivae and EOM are normal. Right eye exhibits no discharge. Left eye exhibits no discharge. No scleral icterus.   Neck: Normal range of motion. Neck supple. No JVD present. No tracheal deviation present. No thyromegaly present.   Cardiovascular: Normal rate, regular rhythm, normal heart sounds and intact distal pulses.   No murmur heard.  Pulmonary/Chest: Effort normal and breath sounds normal. No respiratory distress. He has no wheezes. He has no rales. He exhibits no tenderness.   Abdominal: Soft. Bowel sounds are normal. He exhibits no distension and no mass. There is no  tenderness. There is no rebound and no guarding.   Genitourinary:   Genitourinary Comments: Condom catheter   No inguinal hernia    Musculoskeletal: Normal range of motion.   Lymphadenopathy:     He has no cervical adenopathy.   Neurological: He is alert and oriented to person, place, and time. He has normal reflexes. He displays normal reflexes. No cranial nerve deficit. He exhibits normal muscle tone. Coordination normal.   Skin: Skin is warm and dry.   Psychiatric: He has a normal mood and affect. His behavior is normal. Judgment and thought content normal.        Assessment:       1. Acute pain of left shoulder        Plan:   James was seen today for pain in shoulder, groin pain and urinary tract infection.    Diagnoses and all orders for this visit:    Acute pain of left shoulder    Foul smelling urine  -     POCT urinalysis, dipstick or tablet reag  -     POCT URINE SEDIMENT EXAM    Left groin pain    Bursitis of left shoulder  -     bupivacaine (PF) 0.5% (5 mg/mL) injection 7.5 mg  -     triamcinolone acetonide injection 60 mg    Urinary tract infection with hematuria, site unspecified  -     cephALEXin (KEFLEX) 500 MG capsule; Take 1 capsule (500 mg total) by mouth every 8 (eight) hours. for 7 days      Problem List Items Addressed This Visit     None      Visit Diagnoses     Acute pain of left shoulder    -  Primary          Area prepped and draped in sterile fashion. Using anterior/lateral approach, left shoulder(bursa) injected with kenalog 60mg and bupivicaine 1.5ml. Pt josé manuel well with good relief      No follow-ups on file.

## 2019-08-22 ENCOUNTER — PATIENT OUTREACH (OUTPATIENT)
Dept: ADMINISTRATIVE | Facility: HOSPITAL | Age: 74
End: 2019-08-22

## 2019-08-22 DIAGNOSIS — Z11.59 NEED FOR HEPATITIS C SCREENING TEST: Primary | ICD-10-CM

## 2019-08-24 LAB
BACTERIA UR CULT: ABNORMAL
BACTERIA UR CULT: ABNORMAL

## 2019-09-05 ENCOUNTER — OFFICE VISIT (OUTPATIENT)
Dept: FAMILY MEDICINE | Facility: CLINIC | Age: 74
End: 2019-09-05
Payer: MEDICARE

## 2019-09-05 VITALS
DIASTOLIC BLOOD PRESSURE: 68 MMHG | RESPIRATION RATE: 18 BRPM | SYSTOLIC BLOOD PRESSURE: 118 MMHG | HEART RATE: 64 BPM | BODY MASS INDEX: 26.58 KG/M2 | WEIGHT: 165.38 LBS | HEIGHT: 66 IN

## 2019-09-05 DIAGNOSIS — N39.0 URINARY TRACT INFECTION WITH HEMATURIA, SITE UNSPECIFIED: ICD-10-CM

## 2019-09-05 DIAGNOSIS — Z87.891 FORMER SMOKER: Primary | ICD-10-CM

## 2019-09-05 DIAGNOSIS — R31.9 URINARY TRACT INFECTION WITH HEMATURIA, SITE UNSPECIFIED: ICD-10-CM

## 2019-09-05 LAB
BACTERIA SPEC CULT: NORMAL
BILIRUB SERPL-MCNC: NORMAL MG/DL
BLOOD URINE, POC: 50
CASTS: NORMAL
COLOR, POC UA: NORMAL
CRYSTALS: NORMAL
GLUCOSE UR QL STRIP: NORMAL
KETONES UR QL STRIP: NORMAL
LEUKOCYTE ESTERASE URINE, POC: NORMAL
NITRITE, POC UA: NORMAL
PH, POC UA: 5
PROTEIN, POC: 100
RBC CELLS COUNTED: NORMAL
SPECIFIC GRAVITY, POC UA: 1
UROBILINOGEN, POC UA: NORMAL
WHITE BLOOD CELLS: NORMAL

## 2019-09-05 PROCEDURE — 3078F DIAST BP <80 MM HG: CPT | Mod: HCNC,CPTII,S$GLB, | Performed by: FAMILY MEDICINE

## 2019-09-05 PROCEDURE — 87077 CULTURE AEROBIC IDENTIFY: CPT | Mod: HCNC

## 2019-09-05 PROCEDURE — 99999 PR PBB SHADOW E&M-EST. PATIENT-LVL IV: ICD-10-PCS | Mod: PBBFAC,HCNC,, | Performed by: FAMILY MEDICINE

## 2019-09-05 PROCEDURE — 99999 PR PBB SHADOW E&M-EST. PATIENT-LVL IV: CPT | Mod: PBBFAC,HCNC,, | Performed by: FAMILY MEDICINE

## 2019-09-05 PROCEDURE — 3078F PR MOST RECENT DIASTOLIC BLOOD PRESSURE < 80 MM HG: ICD-10-PCS | Mod: HCNC,CPTII,S$GLB, | Performed by: FAMILY MEDICINE

## 2019-09-05 PROCEDURE — 1101F PR PT FALLS ASSESS DOC 0-1 FALLS W/OUT INJ PAST YR: ICD-10-PCS | Mod: HCNC,CPTII,S$GLB, | Performed by: FAMILY MEDICINE

## 2019-09-05 PROCEDURE — 99213 OFFICE O/P EST LOW 20 MIN: CPT | Mod: HCNC,25,S$GLB, | Performed by: FAMILY MEDICINE

## 2019-09-05 PROCEDURE — 1101F PT FALLS ASSESS-DOCD LE1/YR: CPT | Mod: HCNC,CPTII,S$GLB, | Performed by: FAMILY MEDICINE

## 2019-09-05 PROCEDURE — 81000 POCT URINE SEDIMENT EXAM: ICD-10-PCS | Mod: HCNC,S$GLB,, | Performed by: FAMILY MEDICINE

## 2019-09-05 PROCEDURE — 87086 URINE CULTURE/COLONY COUNT: CPT | Mod: HCNC

## 2019-09-05 PROCEDURE — 99213 PR OFFICE/OUTPT VISIT, EST, LEVL III, 20-29 MIN: ICD-10-PCS | Mod: HCNC,25,S$GLB, | Performed by: FAMILY MEDICINE

## 2019-09-05 PROCEDURE — 87088 URINE BACTERIA CULTURE: CPT | Mod: HCNC

## 2019-09-05 PROCEDURE — 81000 URINALYSIS NONAUTO W/SCOPE: CPT | Mod: HCNC,S$GLB,, | Performed by: FAMILY MEDICINE

## 2019-09-05 PROCEDURE — 87186 SC STD MICRODIL/AGAR DIL: CPT | Mod: HCNC

## 2019-09-05 PROCEDURE — 3074F SYST BP LT 130 MM HG: CPT | Mod: HCNC,CPTII,S$GLB, | Performed by: FAMILY MEDICINE

## 2019-09-05 PROCEDURE — 3074F PR MOST RECENT SYSTOLIC BLOOD PRESSURE < 130 MM HG: ICD-10-PCS | Mod: HCNC,CPTII,S$GLB, | Performed by: FAMILY MEDICINE

## 2019-09-05 RX ORDER — AMOXICILLIN AND CLAVULANATE POTASSIUM 875; 125 MG/1; MG/1
1 TABLET, FILM COATED ORAL 2 TIMES DAILY
Qty: 20 TABLET | Refills: 0 | Status: ON HOLD | OUTPATIENT
Start: 2019-09-05 | End: 2019-10-07 | Stop reason: CLARIF

## 2019-09-05 NOTE — PROGRESS NOTES
Subjective:       Patient ID: James Quan Jr. is a 74 y.o. male.    Chief Complaint: Follow-up ( 2week )    Pt is a 74 y.o. male who presents for evaluation and management of   Encounter Diagnoses   Name Primary?    Former smoker Yes    Urinary tract infection with hematuria, site unspecified    .  Doing well on current meds. Denies any side effects. Prevention is up to date.    Review of Systems   Constitutional: Negative for fever.   Genitourinary: Positive for dysuria.       Objective:      Physical Exam   Constitutional: He is oriented to person, place, and time. He appears well-developed and well-nourished.   HENT:   Head: Normocephalic and atraumatic.   Right Ear: External ear normal.   Left Ear: External ear normal.   Nose: Nose normal.   Mouth/Throat: Oropharynx is clear and moist.   Eyes: Pupils are equal, round, and reactive to light. Conjunctivae and EOM are normal. Right eye exhibits no discharge. Left eye exhibits no discharge. No scleral icterus.   Neck: Normal range of motion. Neck supple. No JVD present. No tracheal deviation present. No thyromegaly present.   Cardiovascular: Normal rate, regular rhythm, normal heart sounds and intact distal pulses.   No murmur heard.  Pulmonary/Chest: Effort normal and breath sounds normal. No respiratory distress. He has no wheezes. He has no rales. He exhibits no tenderness.   Abdominal: Soft. Bowel sounds are normal. He exhibits no distension and no mass. There is no tenderness. There is no rebound and no guarding.   Musculoskeletal: Normal range of motion.   Lymphadenopathy:     He has no cervical adenopathy.   Neurological: He is alert and oriented to person, place, and time. He has normal reflexes. He displays normal reflexes. No cranial nerve deficit. He exhibits normal muscle tone. Coordination normal.   Skin: Skin is warm and dry.   Psychiatric: He has a normal mood and affect. His behavior is normal. Judgment and thought content normal.        Assessment:       1. Former smoker    2. Urinary tract infection with hematuria, site unspecified        Plan:   James was seen today for follow-up.    Diagnoses and all orders for this visit:    Former smoker  -     US Abdominal Aorta; Future    Urinary tract infection with hematuria, site unspecified  -     POCT urinalysis, dipstick or tablet reag  -     POCT URINE SEDIMENT EXAM  -     amoxicillin-clavulanate 875-125mg (AUGMENTIN) 875-125 mg per tablet; Take 1 tablet by mouth 2 (two) times daily.  -     Urine culture      Problem List Items Addressed This Visit     None      Visit Diagnoses     Former smoker    -  Primary    Relevant Orders    US Abdominal Aorta    Urinary tract infection with hematuria, site unspecified        Relevant Medications    amoxicillin-clavulanate 875-125mg (AUGMENTIN) 875-125 mg per tablet    Other Relevant Orders    POCT urinalysis, dipstick or tablet reag    POCT URINE SEDIMENT EXAM    Urine culture        No follow-ups on file.

## 2019-09-08 LAB — BACTERIA UR CULT: ABNORMAL

## 2019-09-09 ENCOUNTER — PATIENT MESSAGE (OUTPATIENT)
Dept: FAMILY MEDICINE | Facility: CLINIC | Age: 74
End: 2019-09-09

## 2019-09-10 ENCOUNTER — PES CALL (OUTPATIENT)
Dept: ADMINISTRATIVE | Facility: CLINIC | Age: 74
End: 2019-09-10

## 2019-09-19 ENCOUNTER — HOSPITAL ENCOUNTER (OUTPATIENT)
Dept: RADIOLOGY | Facility: HOSPITAL | Age: 74
Discharge: HOME | End: 2019-09-19
Attending: FAMILY MEDICINE
Payer: MEDICARE

## 2019-09-19 DIAGNOSIS — Z87.891 FORMER SMOKER: ICD-10-CM

## 2019-09-19 PROCEDURE — 76775 US ABDOMINAL AORTA: ICD-10-PCS | Mod: 26,HCNC,, | Performed by: RADIOLOGY

## 2019-09-19 PROCEDURE — 76775 US EXAM ABDO BACK WALL LIM: CPT | Mod: 26,HCNC,, | Performed by: RADIOLOGY

## 2019-09-19 PROCEDURE — 76775 US EXAM ABDO BACK WALL LIM: CPT | Mod: TC,HCNC

## 2019-09-23 ENCOUNTER — OFFICE VISIT (OUTPATIENT)
Dept: FAMILY MEDICINE | Facility: CLINIC | Age: 74
End: 2019-09-23
Payer: MEDICARE

## 2019-09-23 VITALS
DIASTOLIC BLOOD PRESSURE: 56 MMHG | BODY MASS INDEX: 26.68 KG/M2 | RESPIRATION RATE: 16 BRPM | HEIGHT: 66 IN | HEART RATE: 68 BPM | WEIGHT: 166 LBS | SYSTOLIC BLOOD PRESSURE: 128 MMHG

## 2019-09-23 DIAGNOSIS — N39.0 RECURRENT URINARY TRACT INFECTION: Primary | ICD-10-CM

## 2019-09-23 LAB
BACTERIA SPEC CULT: NORMAL
BILIRUB SERPL-MCNC: 1 MG/DL
BLOOD URINE, POC: 250
CASTS: NORMAL
COLOR, POC UA: NORMAL
CRYSTALS: NORMAL
GLUCOSE UR QL STRIP: NORMAL
KETONES UR QL STRIP: NORMAL
LEUKOCYTE ESTERASE URINE, POC: NORMAL
NITRITE, POC UA: NORMAL
PH, POC UA: 5
PROTEIN, POC: 100
RBC CELLS COUNTED: NORMAL
SPECIFIC GRAVITY, POC UA: 1.01
UROBILINOGEN, POC UA: NORMAL
WHITE BLOOD CELLS: NORMAL

## 2019-09-23 PROCEDURE — 1101F PT FALLS ASSESS-DOCD LE1/YR: CPT | Mod: HCNC,CPTII,S$GLB, | Performed by: FAMILY MEDICINE

## 2019-09-23 PROCEDURE — 90662 FLU VACCINE - HIGH DOSE (65+) PRESERVATIVE FREE IM: ICD-10-PCS | Mod: HCNC,S$GLB,, | Performed by: FAMILY MEDICINE

## 2019-09-23 PROCEDURE — 99213 OFFICE O/P EST LOW 20 MIN: CPT | Mod: 25,HCNC,S$GLB, | Performed by: FAMILY MEDICINE

## 2019-09-23 PROCEDURE — G0008 ADMIN INFLUENZA VIRUS VAC: HCPCS | Mod: HCNC,S$GLB,, | Performed by: FAMILY MEDICINE

## 2019-09-23 PROCEDURE — 3074F PR MOST RECENT SYSTOLIC BLOOD PRESSURE < 130 MM HG: ICD-10-PCS | Mod: HCNC,CPTII,S$GLB, | Performed by: FAMILY MEDICINE

## 2019-09-23 PROCEDURE — 3074F SYST BP LT 130 MM HG: CPT | Mod: HCNC,CPTII,S$GLB, | Performed by: FAMILY MEDICINE

## 2019-09-23 PROCEDURE — 99999 PR PBB SHADOW E&M-EST. PATIENT-LVL IV: ICD-10-PCS | Mod: PBBFAC,HCNC,, | Performed by: FAMILY MEDICINE

## 2019-09-23 PROCEDURE — 99999 PR PBB SHADOW E&M-EST. PATIENT-LVL IV: CPT | Mod: PBBFAC,HCNC,, | Performed by: FAMILY MEDICINE

## 2019-09-23 PROCEDURE — 81001 URINALYSIS AUTO W/SCOPE: CPT | Mod: HCNC,S$GLB,, | Performed by: FAMILY MEDICINE

## 2019-09-23 PROCEDURE — 3078F PR MOST RECENT DIASTOLIC BLOOD PRESSURE < 80 MM HG: ICD-10-PCS | Mod: HCNC,CPTII,S$GLB, | Performed by: FAMILY MEDICINE

## 2019-09-23 PROCEDURE — 99213 PR OFFICE/OUTPT VISIT, EST, LEVL III, 20-29 MIN: ICD-10-PCS | Mod: 25,HCNC,S$GLB, | Performed by: FAMILY MEDICINE

## 2019-09-23 PROCEDURE — 3078F DIAST BP <80 MM HG: CPT | Mod: HCNC,CPTII,S$GLB, | Performed by: FAMILY MEDICINE

## 2019-09-23 PROCEDURE — 90662 IIV NO PRSV INCREASED AG IM: CPT | Mod: HCNC,S$GLB,, | Performed by: FAMILY MEDICINE

## 2019-09-23 PROCEDURE — 1101F PR PT FALLS ASSESS DOC 0-1 FALLS W/OUT INJ PAST YR: ICD-10-PCS | Mod: HCNC,CPTII,S$GLB, | Performed by: FAMILY MEDICINE

## 2019-09-23 PROCEDURE — 81001 POCT URINALYSIS, DIPSTICK OR TABLET REAGENT, AUTOMATED, WITH MICROSCOP: ICD-10-PCS | Mod: HCNC,S$GLB,, | Performed by: FAMILY MEDICINE

## 2019-09-23 PROCEDURE — G0008 FLU VACCINE - HIGH DOSE (65+) PRESERVATIVE FREE IM: ICD-10-PCS | Mod: HCNC,S$GLB,, | Performed by: FAMILY MEDICINE

## 2019-09-23 NOTE — PROGRESS NOTES
Subjective:       Patient ID: James Quan Jr. is a 74 y.o. male.    Chief Complaint: Follow-up (2 week )    Pt is a 74 y.o. male who presents for evaluation and management of   Encounter Diagnosis   Name Primary?    Recurrent urinary tract infection Yes   .no fever  He feels his urine is clearing up.   Doing well on current meds. Denies any side effects. Prevention is up to date.  Review of Systems   Constitutional: Negative for chills and fever.   Genitourinary: Negative for dysuria and frequency.       Objective:      Physical Exam   Constitutional: He is oriented to person, place, and time. He appears well-developed and well-nourished.   HENT:   Head: Normocephalic and atraumatic.   Right Ear: External ear normal.   Left Ear: External ear normal.   Nose: Nose normal.   Mouth/Throat: Oropharynx is clear and moist.   Eyes: Pupils are equal, round, and reactive to light. Conjunctivae and EOM are normal. Right eye exhibits no discharge. Left eye exhibits no discharge. No scleral icterus.   Neck: Normal range of motion. Neck supple. No JVD present. No tracheal deviation present. No thyromegaly present.   Cardiovascular: Normal rate, regular rhythm, normal heart sounds and intact distal pulses.   No murmur heard.  Pulmonary/Chest: Effort normal and breath sounds normal. No respiratory distress. He has no wheezes. He has no rales. He exhibits no tenderness.   Abdominal: Soft. Bowel sounds are normal. He exhibits no distension and no mass. There is no tenderness. There is no rebound and no guarding.   Musculoskeletal: Normal range of motion.   Lymphadenopathy:     He has no cervical adenopathy.   Neurological: He is alert and oriented to person, place, and time. He has normal reflexes. He displays normal reflexes. No cranial nerve deficit. He exhibits normal muscle tone. Coordination normal.   Skin: Skin is warm and dry.   Psychiatric: He has a normal mood and affect. His behavior is normal. Judgment and  thought content normal.       Assessment:       1. Recurrent urinary tract infection        Plan:   James was seen today for follow-up.    Diagnoses and all orders for this visit:    Recurrent urinary tract infection  -     POCT URINE DIPSTICK WITH MICROSCOPE, AUTOMATED  -     POCT URINE SEDIMENT EXAM      Problem List Items Addressed This Visit     None      Visit Diagnoses     Recurrent urinary tract infection    -  Primary    Relevant Orders    POCT URINE DIPSTICK WITH MICROSCOPE, AUTOMATED    POCT URINE SEDIMENT EXAM        U/a is improved but he still has quite a few WBCs, about 60-70/HPF.    Since he is not having systemic symptoms and he has a history of recurrent UTI's, I will not treat him any further as I do not want to run the risk of creating multidrug resistant UTI in this pt with a history of self catheterization.       No follow-ups on file.

## 2019-10-01 ENCOUNTER — HOSPITAL ENCOUNTER (EMERGENCY)
Facility: HOSPITAL | Age: 74
Discharge: HOME | End: 2019-10-01
Attending: SURGERY
Payer: MEDICARE

## 2019-10-01 VITALS
SYSTOLIC BLOOD PRESSURE: 156 MMHG | HEART RATE: 66 BPM | BODY MASS INDEX: 26.17 KG/M2 | DIASTOLIC BLOOD PRESSURE: 79 MMHG | TEMPERATURE: 98 F | RESPIRATION RATE: 16 BRPM | WEIGHT: 162.81 LBS | OXYGEN SATURATION: 100 % | HEIGHT: 66 IN

## 2019-10-01 DIAGNOSIS — N39.0 URINARY TRACT INFECTION WITHOUT HEMATURIA, SITE UNSPECIFIED: Primary | ICD-10-CM

## 2019-10-01 LAB
ALBUMIN SERPL BCP-MCNC: 3.6 G/DL (ref 3.5–5.2)
ALP SERPL-CCNC: 90 U/L (ref 55–135)
ALT SERPL W/O P-5'-P-CCNC: 23 U/L (ref 10–44)
ANION GAP SERPL CALC-SCNC: 7 MMOL/L (ref 8–16)
AST SERPL-CCNC: 21 U/L (ref 10–40)
BACTERIA #/AREA URNS HPF: ABNORMAL /HPF
BASOPHILS # BLD AUTO: 0.03 K/UL (ref 0–0.2)
BASOPHILS NFR BLD: 0.4 % (ref 0–1.9)
BILIRUB SERPL-MCNC: 0.5 MG/DL (ref 0.1–1)
BILIRUB UR QL STRIP: NEGATIVE
BUN SERPL-MCNC: 10 MG/DL (ref 8–23)
CALCIUM SERPL-MCNC: 9.4 MG/DL (ref 8.7–10.5)
CHLORIDE SERPL-SCNC: 104 MMOL/L (ref 95–110)
CLARITY UR: CLEAR
CO2 SERPL-SCNC: 28 MMOL/L (ref 23–29)
COLOR UR: YELLOW
CREAT SERPL-MCNC: 1.1 MG/DL (ref 0.5–1.4)
DIFFERENTIAL METHOD: ABNORMAL
EOSINOPHIL # BLD AUTO: 0.1 K/UL (ref 0–0.5)
EOSINOPHIL NFR BLD: 1.7 % (ref 0–8)
ERYTHROCYTE [DISTWIDTH] IN BLOOD BY AUTOMATED COUNT: 14.6 % (ref 11.5–14.5)
EST. GFR  (AFRICAN AMERICAN): >60 ML/MIN/1.73 M^2
EST. GFR  (NON AFRICAN AMERICAN): >60 ML/MIN/1.73 M^2
GLUCOSE SERPL-MCNC: 103 MG/DL (ref 70–110)
GLUCOSE UR QL STRIP: NEGATIVE
HCT VFR BLD AUTO: 37.1 % (ref 40–54)
HGB BLD-MCNC: 12 G/DL (ref 14–18)
HGB UR QL STRIP: ABNORMAL
IMM GRANULOCYTES # BLD AUTO: 0.02 K/UL (ref 0–0.04)
IMM GRANULOCYTES NFR BLD AUTO: 0.2 % (ref 0–0.5)
KETONES UR QL STRIP: NEGATIVE
LACTATE SERPL-SCNC: 0.7 MMOL/L (ref 0.5–2.2)
LEUKOCYTE ESTERASE UR QL STRIP: ABNORMAL
LYMPHOCYTES # BLD AUTO: 1.4 K/UL (ref 1–4.8)
LYMPHOCYTES NFR BLD: 16.6 % (ref 18–48)
MCH RBC QN AUTO: 32.2 PG (ref 27–31)
MCHC RBC AUTO-ENTMCNC: 32.3 G/DL (ref 32–36)
MCV RBC AUTO: 100 FL (ref 82–98)
MICROSCOPIC COMMENT: ABNORMAL
MONOCYTES # BLD AUTO: 0.8 K/UL (ref 0.3–1)
MONOCYTES NFR BLD: 9.6 % (ref 4–15)
NEUTROPHILS # BLD AUTO: 5.9 K/UL (ref 1.8–7.7)
NEUTROPHILS NFR BLD: 71.5 % (ref 38–73)
NITRITE UR QL STRIP: NEGATIVE
NRBC BLD-RTO: 0 /100 WBC
PH UR STRIP: 7 [PH] (ref 5–8)
PLATELET # BLD AUTO: 314 K/UL (ref 150–350)
PMV BLD AUTO: 9.2 FL (ref 9.2–12.9)
POTASSIUM SERPL-SCNC: 4.2 MMOL/L (ref 3.5–5.1)
PROT SERPL-MCNC: 7.3 G/DL (ref 6–8.4)
PROT UR QL STRIP: NEGATIVE
RBC # BLD AUTO: 3.73 M/UL (ref 4.6–6.2)
RBC #/AREA URNS HPF: 10 /HPF (ref 0–4)
SODIUM SERPL-SCNC: 139 MMOL/L (ref 136–145)
SP GR UR STRIP: 1.01 (ref 1–1.03)
SQUAMOUS #/AREA URNS HPF: 3 /HPF
URN SPEC COLLECT METH UR: ABNORMAL
UROBILINOGEN UR STRIP-ACNC: NEGATIVE EU/DL
WBC # BLD AUTO: 8.26 K/UL (ref 3.9–12.7)
WBC #/AREA URNS HPF: 75 /HPF (ref 0–5)
WBC CLUMPS URNS QL MICRO: ABNORMAL

## 2019-10-01 PROCEDURE — 81000 URINALYSIS NONAUTO W/SCOPE: CPT | Mod: HCNC

## 2019-10-01 PROCEDURE — 87040 BLOOD CULTURE FOR BACTERIA: CPT | Mod: HCNC

## 2019-10-01 PROCEDURE — 63600175 PHARM REV CODE 636 W HCPCS: Mod: HCNC | Performed by: SURGERY

## 2019-10-01 PROCEDURE — 87077 CULTURE AEROBIC IDENTIFY: CPT | Mod: HCNC

## 2019-10-01 PROCEDURE — 80053 COMPREHEN METABOLIC PANEL: CPT | Mod: HCNC

## 2019-10-01 PROCEDURE — 87088 URINE BACTERIA CULTURE: CPT | Mod: HCNC

## 2019-10-01 PROCEDURE — 36415 COLL VENOUS BLD VENIPUNCTURE: CPT | Mod: HCNC

## 2019-10-01 PROCEDURE — 85025 COMPLETE CBC W/AUTO DIFF WBC: CPT | Mod: HCNC

## 2019-10-01 PROCEDURE — 99284 EMERGENCY DEPT VISIT MOD MDM: CPT | Mod: 25,HCNC

## 2019-10-01 PROCEDURE — 96372 THER/PROPH/DIAG INJ SC/IM: CPT | Mod: HCNC

## 2019-10-01 PROCEDURE — 83605 ASSAY OF LACTIC ACID: CPT | Mod: HCNC

## 2019-10-01 PROCEDURE — 87086 URINE CULTURE/COLONY COUNT: CPT | Mod: HCNC

## 2019-10-01 PROCEDURE — 87186 SC STD MICRODIL/AGAR DIL: CPT | Mod: HCNC

## 2019-10-01 RX ORDER — CEFTRIAXONE 1 G/1
1 INJECTION, POWDER, FOR SOLUTION INTRAMUSCULAR; INTRAVENOUS
Status: COMPLETED | OUTPATIENT
Start: 2019-10-01 | End: 2019-10-01

## 2019-10-01 RX ORDER — CEPHALEXIN 500 MG/1
500 CAPSULE ORAL EVERY 8 HOURS
Qty: 21 CAPSULE | Refills: 0 | Status: ON HOLD | OUTPATIENT
Start: 2019-10-01 | End: 2019-10-08 | Stop reason: HOSPADM

## 2019-10-01 RX ADMIN — CEFTRIAXONE SODIUM 1 G: 1 INJECTION, POWDER, FOR SOLUTION INTRAMUSCULAR; INTRAVENOUS at 12:10

## 2019-10-01 NOTE — ED PROVIDER NOTES
Encounter Date: 10/1/2019       History     Chief Complaint   Patient presents with    Groin Pain     73 y/o male presents with suprapubic abdomoinal x several months intermittently. Current pain rated 3/10.  Has been treated with antibiotics multiple times.  Denies urinary frequency, urgency, or dysuria.  Reports that he has to use catheter a few times a week x several years d/t strictures.   Denies fever, nausea, vomiting or diarrhea.   Under the care of urology, but had his appointment cancelled.     The history is provided by the patient.     Review of patient's allergies indicates:   Allergen Reactions    Niacin preparations Rash    Bactrim [sulfamethoxazole-trimethoprim] Hives and Itching     Past Medical History:   Diagnosis Date    Anemia     Aplasia bone marrow     Asthma     CA of prostate     CML (chronic myeloid leukemia)     Coronary artery disease     Heart attack 2014    Heart failure     Hyperlipidemia     Hypertension     Hypothyroidism     Kidney stones     Prostate cancer     Thyroid disease     Urinary incontinence      Past Surgical History:   Procedure Laterality Date    APPENDECTOMY      ATRIAL CARDIAC PACEMAKER INSERTION  10/2018    CHOLECYSTECTOMY  1980    CORONARY ARTERY BYPASS GRAFT      CORONARY STENT PLACEMENT      CYSTOSCOPY      heart attack  2014    PROSTATE SURGERY      TURP    radiation      for ca prostate    TONSILLECTOMY      As child    TRANSURETHRAL RESECTION OF PROSTATE      x3    TRANSURETHRAL RESECTION OF PROSTATE      urethral dilation      VASECTOMY  1979     Family History   Problem Relation Age of Onset    Diabetes Father      Social History     Tobacco Use    Smoking status: Former Smoker     Packs/day: 1.00     Years: 25.00     Pack years: 25.00     Types: Cigarettes     Last attempt to quit: 1988     Years since quittin.4    Smokeless tobacco: Never Used   Substance Use Topics    Alcohol use: No     Drug use: No     Review of Systems   Constitutional: Negative for fever.   HENT: Negative for congestion, ear pain, rhinorrhea, sore throat and trouble swallowing.    Eyes: Negative for pain, discharge, redness and visual disturbance.   Respiratory: Negative for cough, shortness of breath and wheezing.    Cardiovascular: Negative for chest pain and leg swelling.   Gastrointestinal: Positive for abdominal pain (suprapubic). Negative for constipation, diarrhea, nausea and vomiting.   Genitourinary: Positive for difficulty urinating (chronic). Negative for dysuria, flank pain, frequency and urgency.   Musculoskeletal: Negative for arthralgias, back pain, myalgias and neck pain.   Skin: Negative for rash and wound.   Neurological: Negative for seizures, weakness and headaches.   Psychiatric/Behavioral: Negative.        Physical Exam     Initial Vitals [10/01/19 1011]   BP Pulse Resp Temp SpO2   (!) 144/69 77 20 97.6 °F (36.4 °C) 100 %      MAP       --         Physical Exam    Nursing note and vitals reviewed.  Constitutional: He appears well-developed and well-nourished.   HENT:   Head: Normocephalic and atraumatic.   Mouth/Throat: Oropharynx is clear and moist.   Eyes: EOM are normal. Pupils are equal, round, and reactive to light.   Neck: Normal range of motion. Neck supple.   Cardiovascular: Normal rate.   Pulmonary/Chest: Breath sounds normal.   Abdominal: Soft. He exhibits no distension. There is no tenderness. There is no rebound.   Musculoskeletal: Normal range of motion.   Neurological: He is alert and oriented to person, place, and time.   Skin: Skin is warm and dry.   Psychiatric: He has a normal mood and affect. Thought content normal.         ED Course   Procedures  Labs Reviewed - No data to display       Imaging Results    None                               Clinical Impression:       ICD-10-CM ICD-9-CM   1. Urinary tract infection without hematuria, site unspecified N39.0 599.0         Disposition:    Disposition: Discharged  Condition: Stable                        James Archer Jr., MD  10/01/19 0187

## 2019-10-01 NOTE — ED NOTES
Pt given urine speciman cup, omari soap towelettewipe, and instructions for [facility]; understanding verbalized

## 2019-10-01 NOTE — ED TRIAGE NOTES
74 y.o. male presents to ER   Chief Complaint   Patient presents with    Groin Pain   Pt reports groin pain off an on since June, reports frequent UTIs.  No acute distress noted.

## 2019-10-02 ENCOUNTER — TELEPHONE (OUTPATIENT)
Dept: UROLOGY | Facility: CLINIC | Age: 74
End: 2019-10-02

## 2019-10-02 ENCOUNTER — TELEPHONE (OUTPATIENT)
Dept: FAMILY MEDICINE | Facility: CLINIC | Age: 74
End: 2019-10-02

## 2019-10-02 DIAGNOSIS — R10.84 GENERALIZED ABDOMINAL PAIN: ICD-10-CM

## 2019-10-02 DIAGNOSIS — C61 CA PROSTATE, ADENOCA: Primary | ICD-10-CM

## 2019-10-02 DIAGNOSIS — R10.2 ACUTE SUPRAPUBIC PAIN: ICD-10-CM

## 2019-10-02 NOTE — TELEPHONE ENCOUNTER
----- Message from Edwina Rocha sent at 10/2/2019 10:25 AM CDT -----  Contact: Elsa- Mother  James Quan Jr.  MRN: [id number]  : 1945  PCP: Alex Mcallister  Home Phone      [phone]  Work Phone      Not on file.  Mobile          [phone]      MESSAGE:   Pts wife states she would like to speak to nurse regarding his ER visit on 10/1/2019 pr was diagnosed with UTI with hematuria, still in a lot of pain this morning and barely able to walk. Please return call @ [phone]. Thanks.

## 2019-10-02 NOTE — TELEPHONE ENCOUNTER
----- Message from Nikia Rey LPN sent at 10/2/2019  9:27 AM CDT -----  Contact: Elsa-Wife  Pt has see dr gibbs in the past () pt has hx prostate cancer, stricture and xrt. Pt also has  Incontinence. Dr zhang would like pt to see dr gibbs tomorrow if possible.  Pt is complaining of pain worse today. Please advise    ----- Message -----  From: Edwina Rocha  Sent: 10/2/2019   8:44 AM CDT  To: Dano GUPTA Jr Staff    James Quan Jr.  MRN: [id number]  : 1945  PCP: Alex Mcallister  Home Phone      [phone]  Work Phone      Not on file.  Mobile          [phone]      MESSAGE:   Pts wife states he would like to be seen today for UTI with hematuria, was seen in ER 10/1/2019 still in a lot of pain this morning and barely able to walk. Please return call @ [phone]. Thanks.

## 2019-10-02 NOTE — TELEPHONE ENCOUNTER
CA prostate, adenoca  -     CT Renal Stone Study ABD Pelvis WO; Future; Expected date: 10/02/2019    Generalized abdominal pain  -     CT Renal Stone Study ABD Pelvis WO; Future; Expected date: 10/02/2019    Acute suprapubic pain  -     CT Renal Stone Study ABD Pelvis WO; Future; Expected date: 10/02/2019    his urine culture pending from yesterday.  He is on Abx.  Continue abx.  Recommended that he should take Azo or pyridium.  Will follow up with CT RSS.  Please have him do the CT RSS and follow up with me on Friday on next week.

## 2019-10-03 ENCOUNTER — TELEPHONE (OUTPATIENT)
Dept: UROLOGY | Facility: CLINIC | Age: 74
End: 2019-10-03

## 2019-10-03 LAB — BACTERIA UR CULT: ABNORMAL

## 2019-10-03 NOTE — TELEPHONE ENCOUNTER
----- Message from Nikia Rey LPN sent at 10/2/2019  9:27 AM CDT -----  Contact: Elsa-Wife  Pt has see dr gibbs in the past () pt has hx prostate cancer, stricture and xrt. Pt also has  Incontinence. Dr zhang would like pt to see dr gibbs tomorrow if possible.  Pt is complaining of pain worse today. Please advise    ----- Message -----  From: Edwina Rocha  Sent: 10/2/2019   8:44 AM CDT  To: Dano GUPTA Jr Staff    James Quan Jr.  MRN: [id number]  : 1945  PCP: Alex Mcallister  Home Phone      [phone]  Work Phone      Not on file.  Mobile          [phone]      MESSAGE:   Pts wife states he would like to be seen today for UTI with hematuria, was seen in ER 10/1/2019 still in a lot of pain this morning and barely able to walk. Please return call @ [phone]. Thanks.

## 2019-10-03 NOTE — TELEPHONE ENCOUNTER
Pt states he was having so much pain that he went to [facility] last night. He states they did a ctscan there and it was normal.(not an [facility] facility)  They gave him a shot of rocephin and a prescription of vantin. He states he appreciates your call , but would like to cancel the ctscan and your office visit. I advised him to keep the visit or even allow me to move it to a later date as your schedule is full. He declined and stated he will call back

## 2019-10-06 LAB — BACTERIA BLD CULT: NORMAL

## 2019-10-07 ENCOUNTER — HOSPITAL ENCOUNTER (OUTPATIENT)
Facility: HOSPITAL | Age: 74
LOS: 1 days | Discharge: HOME | End: 2019-10-08
Attending: SURGERY | Admitting: FAMILY MEDICINE
Payer: MEDICARE

## 2019-10-07 DIAGNOSIS — R42 DIZZINESS: ICD-10-CM

## 2019-10-07 DIAGNOSIS — R53.1 WEAKNESS: Primary | ICD-10-CM

## 2019-10-07 DIAGNOSIS — I25.10 CARDIOVASCULAR DISEASE: ICD-10-CM

## 2019-10-07 DIAGNOSIS — I63.9 CVA (CEREBRAL VASCULAR ACCIDENT): ICD-10-CM

## 2019-10-07 PROBLEM — R78.81 BACTEREMIA DUE TO GRAM-NEGATIVE BACTERIA: Status: RESOLVED | Noted: 2019-05-19 | Resolved: 2019-10-07

## 2019-10-07 LAB
ALBUMIN SERPL BCP-MCNC: 3.4 G/DL (ref 3.5–5.2)
ALP SERPL-CCNC: 70 U/L (ref 55–135)
ALT SERPL W/O P-5'-P-CCNC: 18 U/L (ref 10–44)
ANION GAP SERPL CALC-SCNC: 9 MMOL/L (ref 8–16)
AST SERPL-CCNC: 20 U/L (ref 10–40)
BACTERIA #/AREA URNS HPF: ABNORMAL /HPF
BASOPHILS # BLD AUTO: 0.05 K/UL (ref 0–0.2)
BASOPHILS NFR BLD: 0.4 % (ref 0–1.9)
BILIRUB SERPL-MCNC: 0.5 MG/DL (ref 0.1–1)
BILIRUB UR QL STRIP: NEGATIVE
BNP SERPL-MCNC: 85 PG/ML (ref 0–99)
BUN SERPL-MCNC: 17 MG/DL (ref 8–23)
CALCIUM SERPL-MCNC: 9 MG/DL (ref 8.7–10.5)
CHLORIDE SERPL-SCNC: 106 MMOL/L (ref 95–110)
CK MB SERPL-MCNC: 2.2 NG/ML (ref 0.1–6.5)
CK MB SERPL-RTO: 2.8 % (ref 0–5)
CK SERPL-CCNC: 80 U/L (ref 20–200)
CK SERPL-CCNC: 80 U/L (ref 20–200)
CLARITY UR: CLEAR
CO2 SERPL-SCNC: 25 MMOL/L (ref 23–29)
COLOR UR: YELLOW
CREAT SERPL-MCNC: 1.3 MG/DL (ref 0.5–1.4)
DIFFERENTIAL METHOD: ABNORMAL
EOSINOPHIL # BLD AUTO: 0.1 K/UL (ref 0–0.5)
EOSINOPHIL NFR BLD: 1.1 % (ref 0–8)
ERYTHROCYTE [DISTWIDTH] IN BLOOD BY AUTOMATED COUNT: 14.7 % (ref 11.5–14.5)
EST. GFR  (AFRICAN AMERICAN): >60 ML/MIN/1.73 M^2
EST. GFR  (NON AFRICAN AMERICAN): 54 ML/MIN/1.73 M^2
GLUCOSE SERPL-MCNC: 138 MG/DL (ref 70–110)
GLUCOSE UR QL STRIP: NEGATIVE
HCT VFR BLD AUTO: 33.5 % (ref 40–54)
HGB BLD-MCNC: 11.1 G/DL (ref 14–18)
HGB UR QL STRIP: ABNORMAL
IMM GRANULOCYTES # BLD AUTO: 0.07 K/UL (ref 0–0.04)
IMM GRANULOCYTES NFR BLD AUTO: 0.6 % (ref 0–0.5)
KETONES UR QL STRIP: NEGATIVE
LACTATE SERPL-SCNC: 0.7 MMOL/L (ref 0.5–2.2)
LACTATE SERPL-SCNC: 1 MMOL/L (ref 0.5–2.2)
LACTATE SERPL-SCNC: 1.2 MMOL/L (ref 0.5–2.2)
LEUKOCYTE ESTERASE UR QL STRIP: ABNORMAL
LIPASE SERPL-CCNC: 56 U/L (ref 4–60)
LYMPHOCYTES # BLD AUTO: 1.7 K/UL (ref 1–4.8)
LYMPHOCYTES NFR BLD: 14.3 % (ref 18–48)
MAGNESIUM SERPL-MCNC: 1.7 MG/DL (ref 1.6–2.6)
MCH RBC QN AUTO: 32.8 PG (ref 27–31)
MCHC RBC AUTO-ENTMCNC: 33.1 G/DL (ref 32–36)
MCV RBC AUTO: 99 FL (ref 82–98)
MICROSCOPIC COMMENT: ABNORMAL
MONOCYTES # BLD AUTO: 0.9 K/UL (ref 0.3–1)
MONOCYTES NFR BLD: 7.3 % (ref 4–15)
NEUTROPHILS # BLD AUTO: 9.2 K/UL (ref 1.8–7.7)
NEUTROPHILS NFR BLD: 76.3 % (ref 38–73)
NITRITE UR QL STRIP: NEGATIVE
NRBC BLD-RTO: 0 /100 WBC
PH UR STRIP: 6 [PH] (ref 5–8)
PHOSPHATE SERPL-MCNC: 2.9 MG/DL (ref 2.7–4.5)
PLATELET # BLD AUTO: 335 K/UL (ref 150–350)
PMV BLD AUTO: 9.6 FL (ref 9.2–12.9)
POCT GLUCOSE: 138 MG/DL (ref 70–110)
POTASSIUM SERPL-SCNC: 4 MMOL/L (ref 3.5–5.1)
PROT SERPL-MCNC: 6.8 G/DL (ref 6–8.4)
PROT UR QL STRIP: NEGATIVE
RBC # BLD AUTO: 3.38 M/UL (ref 4.6–6.2)
RBC #/AREA URNS HPF: 7 /HPF (ref 0–4)
SODIUM SERPL-SCNC: 140 MMOL/L (ref 136–145)
SP GR UR STRIP: <=1.005 (ref 1–1.03)
TROPONIN I SERPL DL<=0.01 NG/ML-MCNC: <0.006 NG/ML (ref 0–0.03)
TSH SERPL DL<=0.005 MIU/L-ACNC: 1.76 UIU/ML (ref 0.4–4)
URN SPEC COLLECT METH UR: ABNORMAL
UROBILINOGEN UR STRIP-ACNC: NEGATIVE EU/DL
WBC # BLD AUTO: 11.99 K/UL (ref 3.9–12.7)
WBC #/AREA URNS HPF: 50 /HPF (ref 0–5)

## 2019-10-07 PROCEDURE — 93010 EKG 12-LEAD: ICD-10-PCS | Mod: HCNC,,, | Performed by: INTERNAL MEDICINE

## 2019-10-07 PROCEDURE — 85025 COMPLETE CBC W/AUTO DIFF WBC: CPT | Mod: HCNC

## 2019-10-07 PROCEDURE — 80053 COMPREHEN METABOLIC PANEL: CPT | Mod: HCNC

## 2019-10-07 PROCEDURE — 63600175 PHARM REV CODE 636 W HCPCS: Mod: HCNC | Performed by: SURGERY

## 2019-10-07 PROCEDURE — 94761 N-INVAS EAR/PLS OXIMETRY MLT: CPT | Mod: HCNC

## 2019-10-07 PROCEDURE — 82962 GLUCOSE BLOOD TEST: CPT | Mod: HCNC

## 2019-10-07 PROCEDURE — 87088 URINE BACTERIA CULTURE: CPT | Mod: HCNC

## 2019-10-07 PROCEDURE — 87086 URINE CULTURE/COLONY COUNT: CPT | Mod: HCNC

## 2019-10-07 PROCEDURE — G0425 PR INPT TELEHEALTH CONSULT 30M: ICD-10-PCS | Mod: GT,HCNC,G0, | Performed by: PSYCHIATRY & NEUROLOGY

## 2019-10-07 PROCEDURE — 87186 SC STD MICRODIL/AGAR DIL: CPT | Mod: HCNC

## 2019-10-07 PROCEDURE — 87040 BLOOD CULTURE FOR BACTERIA: CPT | Mod: HCNC

## 2019-10-07 PROCEDURE — 99900035 HC TECH TIME PER 15 MIN (STAT): Mod: HCNC

## 2019-10-07 PROCEDURE — G0425 INPT/ED TELECONSULT30: HCPCS | Mod: GT,HCNC,G0, | Performed by: PSYCHIATRY & NEUROLOGY

## 2019-10-07 PROCEDURE — 83880 ASSAY OF NATRIURETIC PEPTIDE: CPT | Mod: HCNC

## 2019-10-07 PROCEDURE — 84443 ASSAY THYROID STIM HORMONE: CPT | Mod: HCNC

## 2019-10-07 PROCEDURE — 25000003 PHARM REV CODE 250: Mod: HCNC | Performed by: FAMILY MEDICINE

## 2019-10-07 PROCEDURE — 87077 CULTURE AEROBIC IDENTIFY: CPT | Mod: HCNC

## 2019-10-07 PROCEDURE — 84484 ASSAY OF TROPONIN QUANT: CPT | Mod: HCNC

## 2019-10-07 PROCEDURE — 83735 ASSAY OF MAGNESIUM: CPT | Mod: HCNC

## 2019-10-07 PROCEDURE — 82550 ASSAY OF CK (CPK): CPT | Mod: HCNC

## 2019-10-07 PROCEDURE — 93010 ELECTROCARDIOGRAM REPORT: CPT | Mod: HCNC,,, | Performed by: INTERNAL MEDICINE

## 2019-10-07 PROCEDURE — 25500020 PHARM REV CODE 255: Mod: HCNC | Performed by: SURGERY

## 2019-10-07 PROCEDURE — 99900031 HC PATIENT EDUCATION (STAT): Mod: HCNC

## 2019-10-07 PROCEDURE — 36415 COLL VENOUS BLD VENIPUNCTURE: CPT | Mod: HCNC

## 2019-10-07 PROCEDURE — 81000 URINALYSIS NONAUTO W/SCOPE: CPT | Mod: HCNC

## 2019-10-07 PROCEDURE — 83690 ASSAY OF LIPASE: CPT | Mod: HCNC

## 2019-10-07 PROCEDURE — 82553 CREATINE MB FRACTION: CPT | Mod: HCNC

## 2019-10-07 PROCEDURE — 99220 PR INITIAL OBSERVATION CARE,LEVL III: CPT | Mod: HCNC,,, | Performed by: FAMILY MEDICINE

## 2019-10-07 PROCEDURE — 83605 ASSAY OF LACTIC ACID: CPT | Mod: 91,HCNC

## 2019-10-07 PROCEDURE — 84100 ASSAY OF PHOSPHORUS: CPT | Mod: HCNC

## 2019-10-07 PROCEDURE — 93005 ELECTROCARDIOGRAM TRACING: CPT | Mod: HCNC

## 2019-10-07 PROCEDURE — G0378 HOSPITAL OBSERVATION PER HR: HCPCS | Mod: HCNC

## 2019-10-07 PROCEDURE — 99220 PR INITIAL OBSERVATION CARE,LEVL III: ICD-10-PCS | Mod: HCNC,,, | Performed by: FAMILY MEDICINE

## 2019-10-07 PROCEDURE — 94760 N-INVAS EAR/PLS OXIMETRY 1: CPT | Mod: HCNC

## 2019-10-07 PROCEDURE — 99285 EMERGENCY DEPT VISIT HI MDM: CPT | Mod: 25,HCNC

## 2019-10-07 RX ORDER — ROSUVASTATIN CALCIUM 10 MG/1
10 TABLET, COATED ORAL NIGHTLY
Status: DISCONTINUED | OUTPATIENT
Start: 2019-10-07 | End: 2019-10-08 | Stop reason: HOSPADM

## 2019-10-07 RX ORDER — ONDANSETRON 2 MG/ML
4 INJECTION INTRAMUSCULAR; INTRAVENOUS EVERY 8 HOURS PRN
Status: DISCONTINUED | OUTPATIENT
Start: 2019-10-07 | End: 2019-10-08 | Stop reason: HOSPADM

## 2019-10-07 RX ORDER — ASPIRIN 81 MG/1
81 TABLET ORAL DAILY
Status: DISCONTINUED | OUTPATIENT
Start: 2019-10-08 | End: 2019-10-08 | Stop reason: HOSPADM

## 2019-10-07 RX ORDER — SODIUM CHLORIDE 9 MG/ML
1000 INJECTION, SOLUTION INTRAVENOUS
Status: COMPLETED | OUTPATIENT
Start: 2019-10-07 | End: 2019-10-07

## 2019-10-07 RX ORDER — RANOLAZINE 500 MG/1
500 TABLET, EXTENDED RELEASE ORAL 2 TIMES DAILY
Status: DISCONTINUED | OUTPATIENT
Start: 2019-10-07 | End: 2019-10-08 | Stop reason: HOSPADM

## 2019-10-07 RX ORDER — SODIUM CHLORIDE 0.9 % (FLUSH) 0.9 %
10 SYRINGE (ML) INJECTION
Status: DISCONTINUED | OUTPATIENT
Start: 2019-10-07 | End: 2019-10-08 | Stop reason: HOSPADM

## 2019-10-07 RX ORDER — LEVOTHYROXINE SODIUM 100 UG/1
100 TABLET ORAL DAILY
Status: DISCONTINUED | OUTPATIENT
Start: 2019-10-08 | End: 2019-10-08 | Stop reason: HOSPADM

## 2019-10-07 RX ORDER — PANTOPRAZOLE SODIUM 40 MG/1
40 TABLET, DELAYED RELEASE ORAL DAILY
Status: DISCONTINUED | OUTPATIENT
Start: 2019-10-08 | End: 2019-10-08 | Stop reason: HOSPADM

## 2019-10-07 RX ORDER — CEPHALEXIN 500 MG/1
500 CAPSULE ORAL EVERY 8 HOURS
Status: CANCELLED | OUTPATIENT
Start: 2019-10-07

## 2019-10-07 RX ORDER — ISOSORBIDE MONONITRATE 30 MG/1
30 TABLET, EXTENDED RELEASE ORAL DAILY
Status: DISCONTINUED | OUTPATIENT
Start: 2019-10-08 | End: 2019-10-08 | Stop reason: HOSPADM

## 2019-10-07 RX ORDER — ACETAMINOPHEN 325 MG/1
650 TABLET ORAL EVERY 8 HOURS PRN
Status: DISCONTINUED | OUTPATIENT
Start: 2019-10-07 | End: 2019-10-08 | Stop reason: HOSPADM

## 2019-10-07 RX ORDER — CEFPODOXIME PROXETIL 200 MG/1
200 TABLET, FILM COATED ORAL EVERY 12 HOURS
Status: DISCONTINUED | OUTPATIENT
Start: 2019-10-07 | End: 2019-10-08 | Stop reason: HOSPADM

## 2019-10-07 RX ORDER — HYDROCODONE BITARTRATE AND ACETAMINOPHEN 5; 325 MG/1; MG/1
1 TABLET ORAL EVERY 4 HOURS PRN
Status: DISCONTINUED | OUTPATIENT
Start: 2019-10-07 | End: 2019-10-08 | Stop reason: HOSPADM

## 2019-10-07 RX ORDER — CEFPODOXIME PROXETIL 200 MG/1
200 TABLET, FILM COATED ORAL EVERY 12 HOURS
Status: DISCONTINUED | OUTPATIENT
Start: 2019-10-07 | End: 2019-10-07

## 2019-10-07 RX ADMIN — CEFPODOXIME PROXETIL 200 MG: 200 TABLET, FILM COATED ORAL at 09:10

## 2019-10-07 RX ADMIN — IOHEXOL 100 ML: 350 INJECTION, SOLUTION INTRAVENOUS at 02:10

## 2019-10-07 RX ADMIN — RANOLAZINE 500 MG: 500 TABLET, FILM COATED, EXTENDED RELEASE ORAL at 08:10

## 2019-10-07 RX ADMIN — SODIUM CHLORIDE 1000 ML: 0.9 INJECTION, SOLUTION INTRAVENOUS at 03:10

## 2019-10-07 NOTE — ASSESSMENT & PLAN NOTE
R/O basilar lesion.  Monitoring tonight.  Seems more like vertigo.  Dr. Higginbotham consulted.  Vessels to posterior circulation okay.  Cannot get MRI.    Dizziness completely resolved at this time.  If it recurs may consider antivert vs scopolamine patch

## 2019-10-07 NOTE — H&P
[facility] Medicine  History & Physical    Patient Name: James Quan Jr.  MRN: [id number]  Admission Date: 10/7/2019  Attending Physician: Roz Fulton MD   Primary Care Provider: Alex Mcallister MD         Patient information was obtained from patient and ER records.     Subjective:     Principal Problem:Weakness    Chief Complaint:   Chief Complaint   Patient presents with    Weakness        HPI: 74 year old male with known cad, bradycardia (s/p pacer), prostate cancer and CLL as well as recent urologic issues and recurrent UTI comes in with c/o dizziness that started at 12:15 today. He was seen in the ER here 2 days ago and started on abx for a symptomatic uti. He notes that he went to the ER at [place] the next day because he was still having symptoms and his abx were adjusted. He was doing well this morning and was cooking food when he started getting dizzy, though. At that point, he asked his wife to take his place and he went to sit down. She notes that his eyes were 'shifting quickly back and forth' and he looked very pale. He sat still for a moment and felt better. However, he could not get up to get to the car to go to the ER, he had to crawl there as with movement his dizziness was much worse. At the time of the dizziness he had diaphoresis and slight pain in his head, but no other symptoms. No recent URI symptoms. In the ER he was noted to by bradycardic with HR 51.    Past Medical History:   Diagnosis Date    Anemia     Aplasia bone marrow     Asthma     CA of prostate     CML (chronic myeloid leukemia)     Coronary artery disease     Heart attack 6/17/2014    Heart failure     Hyperlipidemia     Hypertension     Hypothyroidism     Kidney stones     Prostate cancer     Thyroid disease     Urinary incontinence        Past Surgical History:   Procedure Laterality Date    APPENDECTOMY  1966    ATRIAL CARDIAC PACEMAKER INSERTION  10/2018     CHOLECYSTECTOMY  1980    CORONARY ARTERY BYPASS GRAFT  1988    CORONARY STENT PLACEMENT  1990    CYSTOSCOPY      heart attack  6/17/2014    PROSTATE SURGERY      TURP    radiation      for ca prostate    TONSILLECTOMY      As child    TRANSURETHRAL RESECTION OF PROSTATE      x3    TRANSURETHRAL RESECTION OF PROSTATE      urethral dilation      VASECTOMY  1979       Review of patient's allergies indicates:   Allergen Reactions    Niacin preparations Rash    Bactrim [sulfamethoxazole-trimethoprim] Hives and Itching       No current facility-administered medications on file prior to encounter.      Current Outpatient Medications on File Prior to Encounter   Medication Sig    ascorbic acid, vitamin C, (VITAMIN C) 100 MG tablet Take 100 mg by mouth once daily.    aspirin (ECOTRIN) 81 MG EC tablet Take 81 mg by mouth once daily.    atorvastatin (LIPITOR) 40 MG tablet Take 40 mg by mouth once daily.     b complex vitamins tablet Take 1 tablet by mouth once daily.    beta carotene 65615 UNIT capsule Take 10,000 Units by mouth once daily.     catheter (BARD RUBBER UTILITY CATHETER) 14 Fr Misc 1 Units by Misc.(Non-Drug; Combo Route) route every 7 days. (Patient taking differently: 1 Units by Misc.(Non-Drug; Combo Route) route every other day. )    cephALEXin (KEFLEX) 500 MG capsule Take 1 capsule (500 mg total) by mouth every 8 (eight) hours. for 7 days    cyanocobalamin (VITAMIN B-12) 1000 MCG tablet Take 1,000 mcg by mouth nightly.     ferrous sulfate 325 (65 FE) MG EC tablet Take 325 mg by mouth once daily.    fish oil-omega-3 fatty acids 300-1,000 mg capsule Take by mouth once daily.    folic acid (FOLVITE) 400 MCG tablet Take 400 mcg by mouth once daily.    GLEEVEC 400 mg Tab 400 mg once daily.     isosorbide mononitrate (IMDUR) 30 MG 24 hr tablet Take 30 mg by mouth once daily.    levothyroxine (SYNTHROID) 100 MCG tablet TAKE 1 TABLET EVERY DAY    losartan (COZAAR) 100 MG tablet Take 1 tablet  (100 mg total) by mouth once daily.    lycopene 10 mg Cap Take 1 capsule by mouth nightly.     NITROSTAT 0.4 mg SL tablet Place 0.4 mg under the tongue every 5 (five) minutes as needed.     PRASTERONE, DHEA, (DHEA ORAL) Take 25 mg by mouth once daily. 50 mg. tablet     RANEXA 1,000 mg Tb12 Take 500 mg by mouth 2 (two) times daily.     vitamin D (VITAMIN D3) 1000 units Tab Take 2,000 Units by mouth once daily.    [DISCONTINUED] amoxicillin-clavulanate 875-125mg (AUGMENTIN) 875-125 mg per tablet Take 1 tablet by mouth 2 (two) times daily.     Family History     Problem Relation (Age of Onset)    Diabetes Father        Tobacco Use    Smoking status: Former Smoker     Packs/day: 1.00     Years: 25.00     Pack years: 25.00     Types: Cigarettes     Last attempt to quit: 1988     Years since quittin.4    Smokeless tobacco: Never Used   Substance and Sexual Activity    Alcohol use: No    Drug use: No    Sexual activity: Yes     Partners: Female     Review of Systems   Constitutional: Negative for chills and fever.   HENT: Negative for congestion, ear pain, postnasal drip, rhinorrhea, sore throat and trouble swallowing.    Eyes: Negative for redness and itching.   Respiratory: Negative for cough, shortness of breath and wheezing.    Cardiovascular: Negative for chest pain and palpitations.   Gastrointestinal: Negative for abdominal pain, diarrhea, nausea and vomiting.   Genitourinary: Negative for dysuria and frequency.        Groin pain   Skin: Negative for rash.   Neurological: Positive for dizziness. Negative for weakness and headaches.     Objective:     Vital Signs (Most Recent):  Temp: 96.5 °F (35.8 °C) (10/07/19 1653)  Pulse: 63 (10/07/19 1800)  Resp: 18 (10/07/19 1653)  BP: (!) 166/70 (10/07/19 1653)  SpO2: 99 % (10/07/19 1653) Vital Signs (24h Range):  Temp:  [96.3 °F (35.7 °C)-96.5 °F (35.8 °C)] 96.5 °F (35.8 °C)  Pulse:  [51-63] 63  Resp:  [18-20] 18  SpO2:  [99 %] 99 %  BP: (141-166)/(65-70)  166/70     Weight: 73.5 kg (162 lb 0.6 oz)  Body mass index is 26.15 kg/m².    Physical Exam   Constitutional: He is oriented to person, place, and time. He appears well-developed. No distress.   HENT:   Head: Normocephalic and atraumatic.   + left sided nystagmus    Hearing aid to right ear   Eyes: Pupils are equal, round, and reactive to light. Conjunctivae are normal.   Neck: Normal range of motion. Neck supple. No thyromegaly present.   Cardiovascular: Normal rate, regular rhythm, normal heart sounds and intact distal pulses.   Pulmonary/Chest: Effort normal and breath sounds normal. No respiratory distress. He has no wheezes.   Abdominal: Soft. Bowel sounds are normal. There is no tenderness.   Musculoskeletal: Normal range of motion. He exhibits no edema.   Lymphadenopathy:     He has no cervical adenopathy.   Neurological: He is alert and oriented to person, place, and time. He displays normal reflexes. No cranial nerve deficit. Coordination normal.   FNF okay. No past pointing.  No Dysdiadocokinesia   Skin: Skin is warm and dry. No rash noted.   Psychiatric: He has a normal mood and affect. His behavior is normal.   Nursing note and vitals reviewed.        CRANIAL NERVES     CN III, IV, VI   Pupils are equal, round, and reactive to light.       Significant Labs:   CBC:   Recent Labs   Lab 10/07/19  1418   WBC 11.99   HGB 11.1*   HCT 33.5*        CMP:   Recent Labs   Lab 10/07/19  1418      K 4.0      CO2 25   *   BUN 17   CREATININE 1.3   CALCIUM 9.0   PROT 6.8   ALBUMIN 3.4*   BILITOT 0.5   ALKPHOS 70   AST 20   ALT 18   ANIONGAP 9   EGFRNONAA 54*     TSH:   Recent Labs   Lab 10/07/19  1418   TSH 1.758     Urine Culture: No results for input(s): LABURIN in the last 48 hours.  Urine Studies:   Recent Labs   Lab 10/07/19  1819   COLORU Yellow   APPEARANCEUA Clear   PHUR 6.0   SPECGRAV <=1.005*   PROTEINUA Negative   GLUCUA Negative   KETONESU Negative   BILIRUBINUA Negative    OCCULTUA 1+*   NITRITE Negative   UROBILINOGEN Negative   LEUKOCYTESUR 1+*   RBCUA 7*   WBCUA 50*   BACTERIA Rare       Significant Imaging: I have reviewed all pertinent imaging results/findings within the past 24 hours.   CXR  Left-sided pacer device is again seen.  The lungs are clear.  The heart is normal in size.  Median sternotomy wires are midline.  Skeletal structures are intact.    CTA  No acute abnormality. No high-grade stenosis or major vessel occlusion.    CT:  No acute intracranial findings.    Age-appropriate cerebral volume loss with mild moderate patchy decreased attenuation supratentorial white matter while nonspecific suggestive for chronic ischemic change.    No evidence for acute intracranial hemorrhage.  Clinical correlation and further evaluation as warranted.    Assessment/Plan:     Dizziness  R/O basilar lesion.  Monitoring tonight.  Seems more like vertigo.  Dr. Higginbotham consulted.  Vessels to posterior circulation okay.  Cannot get MRI.    Dizziness completely resolved at this time.  If it recurs may consider antivert vs scopolamine patch      Essential hypertension  Hold Bp meds and allow for permissive htn overnight.  Is normal at this time.      Acute cystitis without hematuria  Rocephin while here.  Has a cephalosporin at home that is not formulary here.      S/P placement of cardiac pacemaker        Coronary artery disease involving coronary bypass graft of native heart without angina pectoris  Resume aspirin.  BB and losartan on hold tonight.  Ranexa to be given.  Monitor BPs.    Switch from lipitor to crestor.        VTE Risk Mitigation (From admission, onward)         Ordered     IP VTE HIGH RISK PATIENT  Once      10/07/19 1723     Place sequential compression device  Until discontinued      10/07/19 1723                   Roz Fulton MD  Department of Hospital Medicine   [facility]

## 2019-10-07 NOTE — CONSULTS
"      [facility]  Vascular Neurology  Comprehensive Stroke Center  Tele-Consultation Note      Consults    Consulting Provider: MICA JJ  Current Providers  No providers found    Patient Location:  [facility] EMERGENCY DEPARTMENT Emergency Department  Spoke hospital nurse at bedside with patient assisting consultant.     Patient information was obtained from patient, spouse/SO and ED staff.         Assessment/Plan:   73 y/o with HTN, HLD, CAD s/p CABG, CHF, s/p pacemaker placement, CML, prostate cancer, brought in due to acute onset vertigo, unsteadiness, feeling " weak all over".  NIHSS 0 but with horizontal nystagmus on exam. CTH without acute abnormality.  Suspect acute brainstem infarct.  Discussed at length with ED attending and no intervention was iv alteplase was agreed upon.  CTA brain and neck.  Neuro and PT consults.  ASA, atorvastatin.           STROKE DOCUMENTATION     Acute Stroke Times:   Acute Stroke Times   Last Known Normal Date: 10/07/19  Last Known Normal Time: 1240  Symptom Onset Date: 10/07/19  Symptom Onset Time: 1240  Stroke Team Called Date: 10/07/19  Stroke Team Called Time: 1346  Stroke Team Arrival Date: 10/07/19  Stroke Team Arrival Time: 1350  CT Interpretation Time: 1350  Decision to Treat Time for Alteplase: (No iv alteplase)  Decision to Treat Time for IR: (No IR candidate)    NIH Scale:  Interval: baseline  1a. Level of Consciousness: 0-->Alert, keenly responsive  1b. LOC Questions: 0-->Answers both questions correctly  1c. LOC Commands: 0-->Performs both tasks correctly  2. Best Gaze: 0-->Normal  3. Visual: 0-->No visual loss  4. Facial Palsy: 0-->Normal symmetrical movements  5a. Motor Arm, Left: 0-->No drift, limb holds 90 (or 45) degrees for full 10 secs  5b. Motor Arm, Right: 0-->No drift, limb holds 90 (or 45) degrees for full 10 secs  6a. Motor Leg, Left: 0-->No drift, leg holds 30 degree position for full 5 secs  6b. Motor Leg, Right: 0-->No " "drift, leg holds 30 degree position for full 5 secs  7. Limb Ataxia: 0-->Absent  8. Sensory: 0-->Normal, no sensory loss  9. Best Language: 0-->No aphasia, normal  10. Dysarthria: 0-->Normal  11. Extinction and Inattention (formerly Neglect): 0-->No abnormality  Total (NIH Stroke Scale): 0     Modified [place] Score: 0  Petra Coma Scale:15   ABCD2 Score:    [id number] Score:   HAS -BLED Score:   ICH Score:   Hunt & Thompson Classification:       Diagnoses: ataxia-vertigo, acute.  No new Assessment & Plan notes have been filed under this hospital service since the last note was generated.  Service: Vascular Neurology      Blood pressure (!) 147/70, pulse 61, temperature 96.3 °F (35.7 °C), temperature source Oral, resp. rate 18, SpO2 99 %.  Alteplase Eligible?: No  Alteplase Recommendation: Alteplase not recommended due to minimal deficit   Possible Interventional Revascularization Candidate? No; No significant neurological deficit    Disposition Recommendation: admit to inpatient    Subjective:     History of Present Illness: 73 y/o with HTN, HLD, CAD s/p CABG, CHF, s/p pacemaker placement, CML, prostate cancer, brought in due to acute onset vertigo, unsteadiness, feeling " weak all over". Never had similar symptoms before.  No associated otological symptoms.  No improving.   No notes on file      Woke up with symptoms?: no    Recent bleeding noted: no  Does the patient take any Blood Thinners? no  Medications: Antiplatelets:  aspirin      Past Medical History: hypertension, hyperlipidemia, MI/CAD, CHF, Heart Problems, Cancer and s/p pacemaker placement    Past Surgical History: no major surgeries within the last 2 weeks    Family History: no relevant history    Social History: no smoking, no drinking, no drugs    Allergies: Niacin Preparations  Bactrim [Sulfamethoxazole-Trimethoprim] No known drug allergies    Review of Systems   Unable to perform ROS: Acuity of condition   Constitutional: Negative for chills, " diaphoresis and fever.   HENT: Negative for hearing loss, tinnitus and trouble swallowing.    Eyes: Negative for visual disturbance.   Respiratory: Negative for shortness of breath.    Cardiovascular: Negative for chest pain and palpitations.   Gastrointestinal: Negative for vomiting.   Endocrine: Negative for cold intolerance.   Genitourinary: Negative for hematuria.   Musculoskeletal: Negative for neck pain and neck stiffness.   Allergic/Immunologic: Negative for immunocompromised state.   Neurological: Positive for dizziness. Negative for seizures, facial asymmetry, speech difficulty, weakness, numbness and headaches.   Hematological: Does not bruise/bleed easily.   Psychiatric/Behavioral: Negative for agitation, behavioral problems and confusion.     Objective:   Vitals: Blood pressure (!) 147/70, pulse 61, temperature 96.3 °F (35.7 °C), temperature source Oral, resp. rate 18, SpO2 99 %. BP: 141/65, Respiratory Rate: 17 and Heart Rate: 78    CT READ: Yes  No hemmorhage. No mass effect. No early infarct signs.     Physical Exam   Constitutional: He is oriented to person, place, and time. He appears well-developed and well-nourished.   HENT:   Head: Normocephalic and atraumatic.   Eyes: Pupils are equal, round, and reactive to light. EOM are normal.   Neck: Normal range of motion. Neck supple.   Cardiovascular: Normal rate and regular rhythm.   Pulmonary/Chest: No respiratory distress.   Abdominal: He exhibits no mass.   Genitourinary:   Genitourinary Comments: No performed   Musculoskeletal: Normal range of motion. He exhibits no edema or deformity.   Neurological: He is alert and oriented to person, place, and time. No cranial nerve deficit. Coordination normal.   Skin: No rash noted. He is not diaphoretic. No erythema.   Psychiatric: He has a normal mood and affect. His behavior is normal.   Nursing note and vitals reviewed.            Recommended the emergency room physician to have a brief discussion with the  patient and/or family if available regarding the risks and benefits of treatment, and to briefly document the occurrence of that discussion in his clinical encounter note.     The attending portion of this evaluation, treatment, and documentation was performed per Paul Mota MD via audiovisual.    Billing code:  (non-intervention mild to moderate stroke, TIA, some mimics)    · This patient has a critical neurological condition/illness, with some potential for high morbidity and mortality.  · There is a moderate probability for acute neurological change leading to clinical and possibly life-threatening deterioration requiring highest level of physician preparedness for urgent intervention.  · Care was coordinated with other physicians involved in the patient's care.  · Radiologic studies and laboratory data were reviewed and interpreted, and plan of care was re-assessed based on the results.  · Diagnosis, treatment options and prognosis may have been discussed with the patient and/or family members or caregiver.      In your opinion, this was a: Tier 1 Van Negative    Consult End Time: 215 pm    Paul Mota MD  [facility] Stroke [facility]  Vascular Neurology   [facility]

## 2019-10-07 NOTE — HOSPITAL COURSE
Telestroke called. CTA completed. Normal. Recommended that he stay for obs and MRI in morning (he can't do this - he has a pacer). Dr. Higginbotham consulted.     10/7/19 NAD over night. No dizziness or nausea. But still noting symptoms on exam   HR 51-60. Pt requesting PPM interrogation. Cards consulted   Blood cultures negative thus far; has chronic UTI- Urine culture in process  Last urine culture 7 days ago noted + Ecoli; Pt's getting  home Rx- cefpodoxime continued   Pt notes worsening dizziness with turning head to the left; Symptoms suggestive of vertigo but will get MRI  out CVA- R/O Cerebellar /vestibular, pt able to have MRI per cardiology with new Pacemaker

## 2019-10-07 NOTE — HPI
74 year old male with known cad, bradycardia (s/p pacer), prostate cancer and CLL as well as recent urologic issues and recurrent UTI comes in with c/o dizziness that started at 12:15 today. He was seen in the ER here 2 days ago and started on abx for a symptomatic uti. He notes that he went to the ER at [place] the next day because he was still having symptoms and his abx were adjusted. He was doing well this morning and was cooking food when he started getting dizzy, though. At that point, he asked his wife to take his place and he went to sit down. She notes that his eyes were 'shifting quickly back and forth' and he looked very pale. He sat still for a moment and felt better. However, he could not get up to get to the car to go to the ER, he had to crawl there as with movement his dizziness was much worse. At the time of the dizziness he had diaphoresis and slight pain in his head, but no other symptoms. No recent URI symptoms. In the ER he was noted to by bradycardic with HR 51.

## 2019-10-07 NOTE — SUBJECTIVE & OBJECTIVE
Woke up with symptoms?: no    Recent bleeding noted: no  Does the patient take any Blood Thinners? no  Medications: Antiplatelets:  aspirin      Past Medical History: hypertension, hyperlipidemia, MI/CAD, CHF, Heart Problems, Cancer and s/p pacemaker placement    Past Surgical History: no major surgeries within the last 2 weeks    Family History: no relevant history    Social History: no smoking, no drinking, no drugs    Allergies: Niacin Preparations  Bactrim [Sulfamethoxazole-Trimethoprim] No known drug allergies    Review of Systems   Unable to perform ROS: Acuity of condition   Constitutional: Negative for chills, diaphoresis and fever.   HENT: Negative for hearing loss, tinnitus and trouble swallowing.    Eyes: Negative for visual disturbance.   Respiratory: Negative for shortness of breath.    Cardiovascular: Negative for chest pain and palpitations.   Gastrointestinal: Negative for vomiting.   Endocrine: Negative for cold intolerance.   Genitourinary: Negative for hematuria.   Musculoskeletal: Negative for neck pain and neck stiffness.   Allergic/Immunologic: Negative for immunocompromised state.   Neurological: Positive for dizziness. Negative for seizures, facial asymmetry, speech difficulty, weakness, numbness and headaches.   Hematological: Does not bruise/bleed easily.   Psychiatric/Behavioral: Negative for agitation, behavioral problems and confusion.     Objective:   Vitals: Blood pressure (!) 147/70, pulse 61, temperature 96.3 °F (35.7 °C), temperature source Oral, resp. rate 18, SpO2 99 %. BP: 141/65, Respiratory Rate: 17 and Heart Rate: 78    CT READ: Yes  No hemmorhage. No mass effect. No early infarct signs.     Physical Exam   Constitutional: He is oriented to person, place, and time. He appears well-developed and well-nourished.   HENT:   Head: Normocephalic and atraumatic.   Eyes: Pupils are equal, round, and reactive to light. EOM are normal.   Neck: Normal range of motion. Neck supple.    Cardiovascular: Normal rate and regular rhythm.   Pulmonary/Chest: No respiratory distress.   Abdominal: He exhibits no mass.   Genitourinary:   Genitourinary Comments: No performed   Musculoskeletal: Normal range of motion. He exhibits no edema or deformity.   Neurological: He is alert and oriented to person, place, and time. No cranial nerve deficit. Coordination normal.   Skin: No rash noted. He is not diaphoretic. No erythema.   Psychiatric: He has a normal mood and affect. His behavior is normal.   Nursing note and vitals reviewed.

## 2019-10-07 NOTE — ED TRIAGE NOTES
"PT presents with C/O dizziness and weakness that began at 1215 today. PT states " my eyes are jumping"  "

## 2019-10-07 NOTE — SUBJECTIVE & OBJECTIVE
Past Medical History:   Diagnosis Date    Anemia     Aplasia bone marrow     Asthma     CA of prostate     CML (chronic myeloid leukemia)     Coronary artery disease     Heart attack 6/17/2014    Heart failure     Hyperlipidemia     Hypertension     Hypothyroidism     Kidney stones     Prostate cancer     Thyroid disease     Urinary incontinence        Past Surgical History:   Procedure Laterality Date    APPENDECTOMY  1966    ATRIAL CARDIAC PACEMAKER INSERTION  10/2018    CHOLECYSTECTOMY  1980    CORONARY ARTERY BYPASS GRAFT  1988    CORONARY STENT PLACEMENT  1990    CYSTOSCOPY      heart attack  6/17/2014    PROSTATE SURGERY      TURP    radiation      for ca prostate    TONSILLECTOMY      As child    TRANSURETHRAL RESECTION OF PROSTATE      x3    TRANSURETHRAL RESECTION OF PROSTATE      urethral dilation      VASECTOMY  1979       Review of patient's allergies indicates:   Allergen Reactions    Niacin preparations Rash    Bactrim [sulfamethoxazole-trimethoprim] Hives and Itching       No current facility-administered medications on file prior to encounter.      Current Outpatient Medications on File Prior to Encounter   Medication Sig    ascorbic acid, vitamin C, (VITAMIN C) 100 MG tablet Take 100 mg by mouth once daily.    aspirin (ECOTRIN) 81 MG EC tablet Take 81 mg by mouth once daily.    atorvastatin (LIPITOR) 40 MG tablet Take 40 mg by mouth once daily.     b complex vitamins tablet Take 1 tablet by mouth once daily.    beta carotene 79736 UNIT capsule Take 10,000 Units by mouth once daily.     catheter (BARD RUBBER UTILITY CATHETER) 14 Fr Misc 1 Units by Misc.(Non-Drug; Combo Route) route every 7 days. (Patient taking differently: 1 Units by Misc.(Non-Drug; Combo Route) route every other day. )    cephALEXin (KEFLEX) 500 MG capsule Take 1 capsule (500 mg total) by mouth every 8 (eight) hours. for 7 days    cyanocobalamin (VITAMIN B-12) 1000 MCG tablet Take 1,000 mcg by  mouth nightly.     ferrous sulfate 325 (65 FE) MG EC tablet Take 325 mg by mouth once daily.    fish oil-omega-3 fatty acids 300-1,000 mg capsule Take by mouth once daily.    folic acid (FOLVITE) 400 MCG tablet Take 400 mcg by mouth once daily.    GLEEVEC 400 mg Tab 400 mg once daily.     isosorbide mononitrate (IMDUR) 30 MG 24 hr tablet Take 30 mg by mouth once daily.    levothyroxine (SYNTHROID) 100 MCG tablet TAKE 1 TABLET EVERY DAY    losartan (COZAAR) 100 MG tablet Take 1 tablet (100 mg total) by mouth once daily.    lycopene 10 mg Cap Take 1 capsule by mouth nightly.     NITROSTAT 0.4 mg SL tablet Place 0.4 mg under the tongue every 5 (five) minutes as needed.     PRASTERONE, DHEA, (DHEA ORAL) Take 25 mg by mouth once daily. 50 mg. tablet     RANEXA 1,000 mg Tb12 Take 500 mg by mouth 2 (two) times daily.     vitamin D (VITAMIN D3) 1000 units Tab Take 2,000 Units by mouth once daily.    [DISCONTINUED] amoxicillin-clavulanate 875-125mg (AUGMENTIN) 875-125 mg per tablet Take 1 tablet by mouth 2 (two) times daily.     Family History     Problem Relation (Age of Onset)    Diabetes Father        Tobacco Use    Smoking status: Former Smoker     Packs/day: 1.00     Years: 25.00     Pack years: 25.00     Types: Cigarettes     Last attempt to quit: 1988     Years since quittin.4    Smokeless tobacco: Never Used   Substance and Sexual Activity    Alcohol use: No    Drug use: No    Sexual activity: Yes     Partners: Female     Review of Systems   Constitutional: Negative for chills and fever.   HENT: Negative for congestion, ear pain, postnasal drip, rhinorrhea, sore throat and trouble swallowing.    Eyes: Negative for redness and itching.   Respiratory: Negative for cough, shortness of breath and wheezing.    Cardiovascular: Negative for chest pain and palpitations.   Gastrointestinal: Negative for abdominal pain, diarrhea, nausea and vomiting.   Genitourinary: Negative for dysuria and  frequency.        Groin pain   Skin: Negative for rash.   Neurological: Positive for dizziness. Negative for weakness and headaches.     Objective:     Vital Signs (Most Recent):  Temp: 96.5 °F (35.8 °C) (10/07/19 1653)  Pulse: 63 (10/07/19 1800)  Resp: 18 (10/07/19 1653)  BP: (!) 166/70 (10/07/19 1653)  SpO2: 99 % (10/07/19 1653) Vital Signs (24h Range):  Temp:  [96.3 °F (35.7 °C)-96.5 °F (35.8 °C)] 96.5 °F (35.8 °C)  Pulse:  [51-63] 63  Resp:  [18-20] 18  SpO2:  [99 %] 99 %  BP: (141-166)/(65-70) 166/70     Weight: 73.5 kg (162 lb 0.6 oz)  Body mass index is 26.15 kg/m².    Physical Exam   Constitutional: He is oriented to person, place, and time. He appears well-developed. No distress.   HENT:   Head: Normocephalic and atraumatic.   + left sided nystagmus    Hearing aid to right ear   Eyes: Pupils are equal, round, and reactive to light. Conjunctivae are normal.   Neck: Normal range of motion. Neck supple. No thyromegaly present.   Cardiovascular: Normal rate, regular rhythm, normal heart sounds and intact distal pulses.   Pulmonary/Chest: Effort normal and breath sounds normal. No respiratory distress. He has no wheezes.   Abdominal: Soft. Bowel sounds are normal. There is no tenderness.   Musculoskeletal: Normal range of motion. He exhibits no edema.   Lymphadenopathy:     He has no cervical adenopathy.   Neurological: He is alert and oriented to person, place, and time. He displays normal reflexes. No cranial nerve deficit. Coordination normal.   FNF okay. No past pointing.  No Dysdiadocokinesia   Skin: Skin is warm and dry. No rash noted.   Psychiatric: He has a normal mood and affect. His behavior is normal.   Nursing note and vitals reviewed.        CRANIAL NERVES     CN III, IV, VI   Pupils are equal, round, and reactive to light.       Significant Labs:   CBC:   Recent Labs   Lab 10/07/19  1418   WBC 11.99   HGB 11.1*   HCT 33.5*        CMP:   Recent Labs   Lab 10/07/19  1418      K 4.0   CL  106   CO2 25   *   BUN 17   CREATININE 1.3   CALCIUM 9.0   PROT 6.8   ALBUMIN 3.4*   BILITOT 0.5   ALKPHOS 70   AST 20   ALT 18   ANIONGAP 9   EGFRNONAA 54*     TSH:   Recent Labs   Lab 10/07/19  1418   TSH 1.758     Urine Culture: No results for input(s): LABURIN in the last 48 hours.  Urine Studies:   Recent Labs   Lab 10/07/19  1819   COLORU Yellow   APPEARANCEUA Clear   PHUR 6.0   SPECGRAV <=1.005*   PROTEINUA Negative   GLUCUA Negative   KETONESU Negative   BILIRUBINUA Negative   OCCULTUA 1+*   NITRITE Negative   UROBILINOGEN Negative   LEUKOCYTESUR 1+*   RBCUA 7*   WBCUA 50*   BACTERIA Rare       Significant Imaging: I have reviewed all pertinent imaging results/findings within the past 24 hours.   CXR  Left-sided pacer device is again seen.  The lungs are clear.  The heart is normal in size.  Median sternotomy wires are midline.  Skeletal structures are intact.    CTA  No acute abnormality. No high-grade stenosis or major vessel occlusion.    CT:  No acute intracranial findings.    Age-appropriate cerebral volume loss with mild moderate patchy decreased attenuation supratentorial white matter while nonspecific suggestive for chronic ischemic change.    No evidence for acute intracranial hemorrhage.  Clinical correlation and further evaluation as warranted.

## 2019-10-07 NOTE — ED PROVIDER NOTES
[facility]                                                 Chief Complaint  74 y.o. male with Weakness    History of Present Illness  James Quan Jr. presents to the emergency room with weakness  Patient had weakness and dizziness at home 1 hour prior to arrival this evening  Patient had minor visual problems, patient states he was unsteady on his feet   Pt has no obvious gross lateral deficit, telemedicine stroke consult immediately    The history is provided by the patient   device was not used during this ER visit    Past Medical History   -- Anemia     -- Aplasia bone marrow     -- Asthma     -- CA of prostate     -- CML (chronic myeloid leukemia)     -- Coronary artery disease     -- Heart attack     -- Heart failure     -- Hyperlipidemia     -- Hypertension     -- Hypothyroidism     -- Kidney stones     -- Prostate cancer     -- Thyroid disease     -- Urinary incontinence        Past Surgical History   -- APPENDECTOMY       -- CHOLECYSTECTOMY       -- CORONARY ARTERY BYPASS GRAFT       -- CORONARY STENT PLACEMENT       -- CYSTOSCOPY       -- CYSTOSCOPY       -- CYSTOSCOPY       -- DILATION-URETHRA       -- DILATION-URETHRA       -- heart attack       -- INSERTION-CATHETER       -- PROSTATE SURGERY       -- radiation       -- TONSILLECTOMY       -- TRANSURETHRAL RESECTION OF PROSTATE       -- TRANSURETHRAL RESECTION OF PROSTATE       -- urethral dilation       -- VASECTOMY          Review of patient's allergies   -- Niacin preparations     -- Bactrim [sulfamethoxazole-trimethoprim]        I have reviewed all of this patient's past medical, surgical, family, and social   histories as well as active allergies and medications documented in the  electronic medical record    Review of Systems and Physical Exam      Review of Systems  -- Constitution - no fever, denies fatigue, no weakness, no chills  -- Eyes - no tearing or redness, no visual disturbance  -- Ear,  Nose - no tinnitus or earache, no nasal congestion or discharge  -- Mouth,Throat - no sore throat, no toothache, normal voice, normal swallowing  -- Respiratory - denies cough and congestion, no shortness of breath, no CARPENTER  -- Cardiovascular - denies chest pain, no palpitations, denies claudication  -- Gastrointestinal - denies abdominal pain, nausea, vomiting, or diarrhea  -- Genitourinary - no dysuria, denies flank pain, no hematuria, no STD risk  -- Musculoskeletal - denies back pain, negative for trauma or injury  -- Neurological - dizziness, blurred vision, gait changes  -- Skin - denies pallor, rash, or changes in skin. no hives or welts noted  -- Psychiatric - Denies SI or HI, no psychosis or fractured thought noted     Vital Signs  His blood pressure is 147/70 and his pulse is 61.   His respiration is 18 and oxygen saturation is 99%.     Physical Exam  -- Nursing note and vitals reviewed  -- Constitutional: Appears well-developed and well-nourished  -- Head: Atraumatic. Normocephalic. No obvious abnormality  -- Eyes: Pupils are equal and reactive to light. Normal conjunctiva and lids  -- Cardiac: Normal rate, regular rhythm and normal heart sounds  -- Pulmonary: Normal respiratory effort, breath sounds clear to auscultation  -- Abdominal: Soft, no tenderness. Normal bowel sounds. Normal liver edge  -- Musculoskeletal: Normal range of motion, no effusions. Joints stable   -- Neurological: No focal deficits. Showed good interaction with staff  -- Vascular: Posterior tibial, dorsalis pedis and radial pulses 2+ bilaterally      Emergency Room Course      Lab Results     K 4.0      CO2 25   BUN 17   CREATININE 1.3    (H)   ALKPHOS 70   AST 20   ALT 18   BILITOT 0.5   ALBUMIN 3.4 (L)   PROT 6.8   WBC 11.99   HGB 11.1 (L)   HCT 33.5 (L)      CPK 80   CPK 80   CPKMB 2.2   TROPONINI <0.006   BNP 85   DDIMER 1.52 (H)   LACTATE 1.2   MG 1.7   TSH 1.758     EKG  -- The EKG findings today were  without concerning findings from baseline     Radiology  -- The CT of the head performed in the ER today was negative for acute pathology  -- The CTA of head neck performed in the ER today was negative for acute pathology  -- Chest x-ray showed no infiltrate and showed no acute pathology     Telemedicine Stroke Consult  -- Patient was seen by Vascular Neurology via telemedicine in the ER today  -- Please see the telemedicine notes for full evaluation of the consult in the ER     Medications Given  0.9%  NaCl infusion (1,000 mLs Intravenous New Bag 10/7/19 1503)   iohexol (OMNIPAQUE 350) injection 100 mL (100 mLs Intravenous Given 10/7/19 1430)     ED Physician Management  -- Diagnosis management comments: 74 y.o. male with dizziness today  -- patient is currently under treatment for UTI, appropriate based on culture  -- patient however had acute dizziness, gait changes, weakness at home  -- telemedicine stroke consult voice concern for basilar stroke this p.m.  -- CT a the head neck was normal, patient be admitted for evaluation  -- echo and carotid ordered with a Neurology consult for further evaluation    Diagnosis  -- The primary encounter diagnosis was Weakness.   -- A diagnosis of Dizziness was also pertinent to this visit.    Disposition and Plan  -- Disposition: admit  -- Condition: stable    This note is dictated on M*Modal word recognition program.  There are word recognition mistakes that are occasionally missed on review.         Castro Quintero MD  10/07/19 1695

## 2019-10-08 VITALS
DIASTOLIC BLOOD PRESSURE: 57 MMHG | OXYGEN SATURATION: 98 % | HEART RATE: 69 BPM | WEIGHT: 210.56 LBS | SYSTOLIC BLOOD PRESSURE: 110 MMHG | TEMPERATURE: 98 F | BODY MASS INDEX: 33.84 KG/M2 | RESPIRATION RATE: 18 BRPM | HEIGHT: 66 IN

## 2019-10-08 PROBLEM — R27.0 ATAXIA: Status: ACTIVE | Noted: 2019-10-08

## 2019-10-08 LAB
ALBUMIN SERPL BCP-MCNC: 3 G/DL (ref 3.5–5.2)
ALP SERPL-CCNC: 75 U/L (ref 55–135)
ALT SERPL W/O P-5'-P-CCNC: 16 U/L (ref 10–44)
ANION GAP SERPL CALC-SCNC: 6 MMOL/L (ref 8–16)
AORTIC ROOT ANNULUS: 3.89 CM
AST SERPL-CCNC: 17 U/L (ref 10–40)
AV INDEX (PROSTH): 0.88
AV MEAN GRADIENT: 4 MMHG
AV PEAK GRADIENT: 6 MMHG
AV VALVE AREA: 3.21 CM2
AV VELOCITY RATIO: 0.83
BASOPHILS # BLD AUTO: 0.03 K/UL (ref 0–0.2)
BASOPHILS NFR BLD: 0.4 % (ref 0–1.9)
BILIRUB SERPL-MCNC: 0.4 MG/DL (ref 0.1–1)
BSA FOR ECHO PROCEDURE: 1.85 M2
BUN SERPL-MCNC: 14 MG/DL (ref 8–23)
CALCIUM SERPL-MCNC: 9.1 MG/DL (ref 8.7–10.5)
CHLORIDE SERPL-SCNC: 107 MMOL/L (ref 95–110)
CO2 SERPL-SCNC: 29 MMOL/L (ref 23–29)
CREAT SERPL-MCNC: 1.1 MG/DL (ref 0.5–1.4)
CV ECHO LV RWT: 0.52 CM
DIFFERENTIAL METHOD: ABNORMAL
DOP CALC AO PEAK VEL: 1.24 M/S
DOP CALC AO VTI: 24.37 CM
DOP CALC LVOT AREA: 3.7 CM2
DOP CALC LVOT DIAMETER: 2.16 CM
DOP CALC LVOT PEAK VEL: 1.03 M/S
DOP CALC LVOT STROKE VOLUME: 78.23 CM3
DOP CALCLVOT PEAK VEL VTI: 21.36 CM
E WAVE DECELERATION TIME: 249.55 MSEC
E/A RATIO: 0.69
ECHO LV POSTERIOR WALL: 1.22 CM (ref 0.6–1.1)
EOSINOPHIL # BLD AUTO: 0.1 K/UL (ref 0–0.5)
EOSINOPHIL NFR BLD: 1.4 % (ref 0–8)
ERYTHROCYTE [DISTWIDTH] IN BLOOD BY AUTOMATED COUNT: 14.8 % (ref 11.5–14.5)
EST. GFR  (AFRICAN AMERICAN): >60 ML/MIN/1.73 M^2
EST. GFR  (NON AFRICAN AMERICAN): >60 ML/MIN/1.73 M^2
FRACTIONAL SHORTENING: 27 % (ref 28–44)
GLUCOSE SERPL-MCNC: 101 MG/DL (ref 70–110)
HCT VFR BLD AUTO: 32.8 % (ref 40–54)
HGB BLD-MCNC: 10.8 G/DL (ref 14–18)
IMM GRANULOCYTES # BLD AUTO: 0.03 K/UL (ref 0–0.04)
IMM GRANULOCYTES NFR BLD AUTO: 0.4 % (ref 0–0.5)
INTERVENTRICULAR SEPTUM: 0.96 CM (ref 0.6–1.1)
IVRT: 0.16 MSEC
LA MAJOR: 3.77 CM
LA MINOR: 4.94 CM
LA WIDTH: 3.64 CM
LEFT ATRIUM SIZE: 4.28 CM
LEFT ATRIUM VOLUME INDEX: 31 ML/M2
LEFT ATRIUM VOLUME: 56.63 CM3
LEFT INTERNAL DIMENSION IN SYSTOLE: 3.47 CM (ref 2.1–4)
LEFT VENTRICLE DIASTOLIC VOLUME INDEX: 56.86 ML/M2
LEFT VENTRICLE DIASTOLIC VOLUME: 103.99 ML
LEFT VENTRICLE MASS INDEX: 102 G/M2
LEFT VENTRICLE SYSTOLIC VOLUME INDEX: 27.2 ML/M2
LEFT VENTRICLE SYSTOLIC VOLUME: 49.72 ML
LEFT VENTRICULAR INTERNAL DIMENSION IN DIASTOLE: 4.73 CM (ref 3.5–6)
LEFT VENTRICULAR MASS: 187.06 G
LYMPHOCYTES # BLD AUTO: 1.9 K/UL (ref 1–4.8)
LYMPHOCYTES NFR BLD: 24.1 % (ref 18–48)
MCH RBC QN AUTO: 33.2 PG (ref 27–31)
MCHC RBC AUTO-ENTMCNC: 32.9 G/DL (ref 32–36)
MCV RBC AUTO: 101 FL (ref 82–98)
MONOCYTES # BLD AUTO: 0.6 K/UL (ref 0.3–1)
MONOCYTES NFR BLD: 7.6 % (ref 4–15)
MV PEAK A VEL: 1.08 M/S
MV PEAK E VEL: 0.74 M/S
NEUTROPHILS # BLD AUTO: 5.2 K/UL (ref 1.8–7.7)
NEUTROPHILS NFR BLD: 66.1 % (ref 38–73)
NRBC BLD-RTO: 0 /100 WBC
PLATELET # BLD AUTO: 315 K/UL (ref 150–350)
PMV BLD AUTO: 9.5 FL (ref 9.2–12.9)
POTASSIUM SERPL-SCNC: 4.1 MMOL/L (ref 3.5–5.1)
PROT SERPL-MCNC: 6.3 G/DL (ref 6–8.4)
PULM VEIN S/D RATIO: 1.36
PV PEAK D VEL: 0.39 M/S
PV PEAK S VEL: 0.53 M/S
PV PEAK VELOCITY: 1.06 CM/S
RA MAJOR: 3.82 CM
RBC # BLD AUTO: 3.25 M/UL (ref 4.6–6.2)
RIGHT VENTRICULAR END-DIASTOLIC DIMENSION: 3.15 CM
SODIUM SERPL-SCNC: 142 MMOL/L (ref 136–145)
STJ: 3.57 CM
WBC # BLD AUTO: 7.79 K/UL (ref 3.9–12.7)

## 2019-10-08 PROCEDURE — 36415 COLL VENOUS BLD VENIPUNCTURE: CPT | Mod: HCNC

## 2019-10-08 PROCEDURE — 99215 PR OFFICE/OUTPT VISIT, EST, LEVL V, 40-54 MIN: ICD-10-PCS | Mod: HCNC,,, | Performed by: PSYCHIATRY & NEUROLOGY

## 2019-10-08 PROCEDURE — 99215 OFFICE O/P EST HI 40 MIN: CPT | Mod: HCNC,,, | Performed by: PSYCHIATRY & NEUROLOGY

## 2019-10-08 PROCEDURE — 94760 N-INVAS EAR/PLS OXIMETRY 1: CPT | Mod: HCNC

## 2019-10-08 PROCEDURE — 99226 PR SUBSEQUENT OBSERVATION CARE,LEVEL III: ICD-10-PCS | Mod: HCNC,,, | Performed by: FAMILY MEDICINE

## 2019-10-08 PROCEDURE — 25000003 PHARM REV CODE 250: Mod: HCNC | Performed by: FAMILY MEDICINE

## 2019-10-08 PROCEDURE — 80053 COMPREHEN METABOLIC PANEL: CPT | Mod: HCNC

## 2019-10-08 PROCEDURE — 93325 DOPPLER ECHO COLOR FLOW MAPG: CPT | Mod: HCNC

## 2019-10-08 PROCEDURE — 93005 ELECTROCARDIOGRAM TRACING: CPT | Mod: HCNC

## 2019-10-08 PROCEDURE — 85025 COMPLETE CBC W/AUTO DIFF WBC: CPT | Mod: HCNC

## 2019-10-08 PROCEDURE — G0378 HOSPITAL OBSERVATION PER HR: HCPCS | Mod: HCNC

## 2019-10-08 PROCEDURE — 99226 PR SUBSEQUENT OBSERVATION CARE,LEVEL III: CPT | Mod: HCNC,,, | Performed by: FAMILY MEDICINE

## 2019-10-08 RX ORDER — ROSUVASTATIN CALCIUM 10 MG/1
10 TABLET, COATED ORAL NIGHTLY
Qty: 30 TABLET | Refills: 0 | Status: CANCELLED | OUTPATIENT
Start: 2019-10-08 | End: 2020-10-07

## 2019-10-08 RX ORDER — ROSUVASTATIN CALCIUM 10 MG/1
10 TABLET, COATED ORAL NIGHTLY
Qty: 30 TABLET | Refills: 0 | Status: SHIPPED | OUTPATIENT
Start: 2019-10-08 | End: 2019-11-07

## 2019-10-08 RX ORDER — MECLIZINE HYDROCHLORIDE 25 MG/1
25 TABLET ORAL 3 TIMES DAILY PRN
Qty: 30 TABLET | Refills: 0 | Status: SHIPPED | OUTPATIENT
Start: 2019-10-08 | End: 2020-05-04 | Stop reason: ALTCHOICE

## 2019-10-08 RX ORDER — CEFPODOXIME PROXETIL 200 MG/1
200 TABLET, FILM COATED ORAL 2 TIMES DAILY
COMMUNITY
End: 2019-10-11 | Stop reason: SDUPTHER

## 2019-10-08 RX ADMIN — RANOLAZINE 500 MG: 500 TABLET, FILM COATED, EXTENDED RELEASE ORAL at 09:10

## 2019-10-08 RX ADMIN — LEVOTHYROXINE SODIUM 100 MCG: 100 TABLET ORAL at 06:10

## 2019-10-08 RX ADMIN — ISOSORBIDE MONONITRATE 30 MG: 30 TABLET, EXTENDED RELEASE ORAL at 09:10

## 2019-10-08 RX ADMIN — ASPIRIN 81 MG: 81 TABLET, COATED ORAL at 09:10

## 2019-10-08 RX ADMIN — PANTOPRAZOLE SODIUM 40 MG: 40 TABLET, DELAYED RELEASE ORAL at 09:10

## 2019-10-08 NOTE — PROGRESS NOTES
[facility]  Progress Note    Patient Name: James Quan Jr.  MRN: [id number]  Patient Class: OP- Observation   Admission Date: 10/7/2019  Length of Stay: 1 days  Attending Physician: Roz Fulton MD  Primary Care Provider: Alex Mcallister MD        Subjective:     Principal Problem:Weakness        HPI:  74 year old male with known cad, bradycardia (s/p pacer), prostate cancer and CLL as well as recent urologic issues and recurrent UTI comes in with c/o dizziness that started at 12:15 today. He was seen in the ER here 2 days ago and started on abx for a symptomatic uti. He notes that he went to the ER at [place] the next day because he was still having symptoms and his abx were adjusted. He was doing well this morning and was cooking food when he started getting dizzy, though. At that point, he asked his wife to take his place and he went to sit down. She notes that his eyes were 'shifting quickly back and forth' and he looked very pale. He sat still for a moment and felt better. However, he could not get up to get to the car to go to the ER, he had to crawl there as with movement his dizziness was much worse. At the time of the dizziness he had diaphoresis and slight pain in his head, but no other symptoms. No recent URI symptoms. In the ER he was noted to by bradycardic with HR 51.    Overview/Hospital Course:  Telestroke called. CTA completed. Normal. Recommended that he stay for obs and MRI in morning (he can't do this - he has a pacer). Dr. Higginbotham consulted.     10/7/19 NAD over night. No dizziness or nausea. But still noting symptoms on exam   HR 51-60. Pt requesting PPM interrogation. Cards consulted   Blood cultures negative thus far; has chronic UTI- Urine culture in process  Last urine culture 7 days ago noted + Ecoli; Pt's getting  home Rx- cefpodoxime continued   Pt notes worsening dizziness with turning head to the left; Symptoms suggestive of  vertigo but will get MRI  out CVA- R/O Cerebellar /vestibular, pt able to have MRI per cardiology with new Pacemaker       Past Medical History:   Diagnosis Date    Anemia     Aplasia bone marrow     Asthma     CA of prostate     CML (chronic myeloid leukemia)     Coronary artery disease     Heart attack 6/17/2014    Heart failure     Hyperlipidemia     Hypertension     Hypothyroidism     Kidney stones     Prostate cancer     Thyroid disease     Urinary incontinence        Past Surgical History:   Procedure Laterality Date    APPENDECTOMY  1966    ATRIAL CARDIAC PACEMAKER INSERTION  10/2018    CHOLECYSTECTOMY  1980    CORONARY ARTERY BYPASS GRAFT  1988    CORONARY STENT PLACEMENT  1990    CYSTOSCOPY      heart attack  6/17/2014    PROSTATE SURGERY      TURP    radiation      for ca prostate    TONSILLECTOMY      As child    TRANSURETHRAL RESECTION OF PROSTATE      x3    TRANSURETHRAL RESECTION OF PROSTATE      urethral dilation      VASECTOMY  1979       Review of patient's allergies indicates:   Allergen Reactions    Niacin preparations Rash    Bactrim [sulfamethoxazole-trimethoprim] Hives and Itching       No current facility-administered medications on file prior to encounter.      Current Outpatient Medications on File Prior to Encounter   Medication Sig    ascorbic acid, vitamin C, (VITAMIN C) 100 MG tablet Take 100 mg by mouth once daily.    aspirin (ECOTRIN) 81 MG EC tablet Take 81 mg by mouth once daily.    atorvastatin (LIPITOR) 40 MG tablet Take 40 mg by mouth once daily.     b complex vitamins tablet Take 1 tablet by mouth once daily.    beta carotene 19958 UNIT capsule Take 10,000 Units by mouth once daily.     catheter (BARD RUBBER UTILITY CATHETER) 14 Fr Misc 1 Units by Misc.(Non-Drug; Combo Route) route every 7 days. (Patient taking differently: 1 Units by Misc.(Non-Drug; Combo Route) route every other day. )    cephALEXin (KEFLEX) 500 MG capsule Take 1 capsule  (500 mg total) by mouth every 8 (eight) hours. for 7 days    cyanocobalamin (VITAMIN B-12) 1000 MCG tablet Take 1,000 mcg by mouth nightly.     ferrous sulfate 325 (65 FE) MG EC tablet Take 325 mg by mouth once daily.    fish oil-omega-3 fatty acids 300-1,000 mg capsule Take by mouth once daily.    folic acid (FOLVITE) 400 MCG tablet Take 400 mcg by mouth once daily.    GLEEVEC 400 mg Tab 400 mg once daily.     isosorbide mononitrate (IMDUR) 30 MG 24 hr tablet Take 30 mg by mouth once daily.    levothyroxine (SYNTHROID) 100 MCG tablet TAKE 1 TABLET EVERY DAY    losartan (COZAAR) 100 MG tablet Take 1 tablet (100 mg total) by mouth once daily.    lycopene 10 mg Cap Take 1 capsule by mouth nightly.     NITROSTAT 0.4 mg SL tablet Place 0.4 mg under the tongue every 5 (five) minutes as needed.     PRASTERONE, DHEA, (DHEA ORAL) Take 25 mg by mouth once daily. 50 mg. tablet     RANEXA 1,000 mg Tb12 Take 500 mg by mouth 2 (two) times daily.     vitamin D (VITAMIN D3) 1000 units Tab Take 2,000 Units by mouth once daily.     Family History     Problem Relation (Age of Onset)    Diabetes Father        Tobacco Use    Smoking status: Former Smoker     Packs/day: 1.00     Years: 25.00     Pack years: 25.00     Types: Cigarettes     Last attempt to quit: 1988     Years since quittin.4    Smokeless tobacco: Never Used   Substance and Sexual Activity    Alcohol use: No    Drug use: No    Sexual activity: Yes     Partners: Female     Review of Systems   Constitutional: Negative for chills and fever.   HENT: Negative for congestion, ear pain, postnasal drip, rhinorrhea, sore throat and trouble swallowing.    Eyes: Negative for redness and itching.   Respiratory: Negative for cough, shortness of breath and wheezing.    Cardiovascular: Negative for chest pain and palpitations.   Gastrointestinal: Negative for abdominal pain, diarrhea, nausea and vomiting.   Genitourinary: Negative for dysuria and frequency.         Groin pain   Skin: Negative for rash.   Neurological: Positive for dizziness. Negative for weakness and headaches.     Objective:     Vital Signs (Most Recent):  Temp: 98 °F (36.7 °C) (10/08/19 0713)  Pulse: 60 (10/08/19 0713)  Resp: 16 (10/08/19 0713)  BP: (!) 156/73 (10/08/19 0713)  SpO2: 99 % (10/08/19 0713) Vital Signs (24h Range):  Temp:  [96.3 °F (35.7 °C)-98 °F (36.7 °C)] 98 °F (36.7 °C)  Pulse:  [51-72] 60  Resp:  [14-20] 16  SpO2:  [97 %-100 %] 99 %  BP: (129-166)/(60-73) 156/73     Weight: 73.5 kg (162 lb 0.6 oz)  Body mass index is 26.15 kg/m².    Physical Exam   Constitutional: He is oriented to person, place, and time. He appears well-developed. No distress.   HENT:   Head: Normocephalic and atraumatic.   Ears clear. TMs clear    Eyes: Pupils are equal, round, and reactive to light. Conjunctivae are normal.   Neck: Normal range of motion. Neck supple. No thyromegaly present.   Cardiovascular: Normal rate, regular rhythm, normal heart sounds and intact distal pulses.   Pulmonary/Chest: Effort normal and breath sounds normal. No respiratory distress. He has no wheezes.   Abdominal: Soft. Bowel sounds are normal. There is no tenderness.   Musculoskeletal: Normal range of motion. He exhibits no edema.   Lymphadenopathy:     He has no cervical adenopathy.   Neurological: He is alert and oriented to person, place, and time. He displays normal reflexes. No cranial nerve deficit. Coordination normal.   FNF okay. No past pointing.  No Dysdiadocokinesia   Skin: Skin is warm and dry. No rash noted.   Psychiatric: He has a normal mood and affect. His behavior is normal.   Nursing note and vitals reviewed.        CRANIAL NERVES     CN III, IV, VI   Pupils are equal, round, and reactive to light.       Significant Labs:   CBC:   Recent Labs   Lab 10/07/19  1418 10/08/19  0545   WBC 11.99 7.79   HGB 11.1* 10.8*   HCT 33.5* 32.8*    315     CMP:   Recent Labs   Lab 10/07/19  1418 10/08/19  0545     142   K 4.0 4.1    107   CO2 25 29   * 101   BUN 17 14   CREATININE 1.3 1.1   CALCIUM 9.0 9.1   PROT 6.8 6.3   ALBUMIN 3.4* 3.0*   BILITOT 0.5 0.4   ALKPHOS 70 75   AST 20 17   ALT 18 16   ANIONGAP 9 6*   EGFRNONAA 54* >60     TSH:   Recent Labs   Lab 10/07/19  1418   TSH 1.758     Urine Culture: No results for input(s): LABURIN in the last 48 hours.  Urine Studies:   Recent Labs   Lab 10/07/19  1819   COLORU Yellow   APPEARANCEUA Clear   PHUR 6.0   SPECGRAV <=1.005*   PROTEINUA Negative   GLUCUA Negative   KETONESU Negative   BILIRUBINUA Negative   OCCULTUA 1+*   NITRITE Negative   UROBILINOGEN Negative   LEUKOCYTESUR 1+*   RBCUA 7*   WBCUA 50*   BACTERIA Rare       Significant Imaging: I have reviewed all pertinent imaging results/findings within the past 24 hours.   CXR  Left-sided pacer device is again seen.  The lungs are clear.  The heart is normal in size.  Median sternotomy wires are midline.  Skeletal structures are intact.    CTA  No acute abnormality. No high-grade stenosis or major vessel occlusion.    CT:  No acute intracranial findings.    Age-appropriate cerebral volume loss with mild moderate patchy decreased attenuation supratentorial white matter while nonspecific suggestive for chronic ischemic change.    No evidence for acute intracranial hemorrhage.  Clinical correlation and further evaluation as warranted.      Assessment/Plan:      * Weakness        Dizziness  R/O basilar lesion.  Monitoring tonight.  Seems more like vertigo.  Dr. Higginbotham consulted.  Vessels to posterior circulation okay.  Can get MRI according to Dr. Toscano's nurse. Just need cardiology to evaluate pacer post procedure to make sure its still functioning normally.   Will get MRI to rule out posterior CVA   Dizziness completely resolved at this time.  If it recurs may consider antivert vs scopolamine patch      Essential hypertension  Hold Bp meds and allow for permissive htn overnight.  Is normal at this  time.  10/8/19  - 156 systolic     Acute cystitis without hematuria  Pt's getting  home Rx- cefpodoxime continued   Urine culture pending  Blood culture negative x 1d    S/P placement of cardiac pacemaker    Consult cardiology for PPM interrogation    Coronary artery disease involving coronary bypass graft of native heart without angina pectoris  Resume aspirin.  BB and losartan on hold tonight.  Ranexa to be given.  Monitor BPs.    Switch from lipitor to crestor.      Hypothyroidism due to acquired atrophy of thyroid    Continue synthroid       VTE Risk Mitigation (From admission, onward)         Ordered     IP VTE HIGH RISK PATIENT  Once      10/07/19 1723     Place sequential compression device  Until discontinued      10/07/19 1723                      Alex Mcallister MD  Department of Hospital Medicine   [facility]

## 2019-10-08 NOTE — PLAN OF CARE
10/08/19 1041   Discharge Assessment   Assessment Type Discharge Planning Assessment   Confirmed/corrected address and phone number on facesheet? Yes   Assessment information obtained from? Medical Record   Prior to hospitilization cognitive status: Alert/Oriented   Prior to hospitalization functional status: Independent   Current cognitive status: Alert/Oriented   Current Functional Status: Independent   Facility Arrived From: Home   Lives With spouse   Able to Return to Prior Arrangements yes   Is patient able to care for self after discharge? Yes   Who are your caregiver(s) and their phone number(s)? Elsa Quan (Spouse) [phone]   Patient's perception of discharge disposition home or selfcare   Readmission Within the Last 30 Days no previous admission in last 30 days   Patient currently being followed by outpatient case management? No   Patient currently receives any other outside agency services? No   Equipment Currently Used at Home none   Does the patient have transportation home? Yes   Transportation Anticipated family or friend will provide   Discharge Plan A Home   Discharge Plan B Home with family   DME Needed Upon Discharge  none   Patient/Family in Agreement with Plan yes       No post-acute care needs identified at this time. SW to continue to monitor needs throughout hospital stay.     Carolyn Jeronimo LMSW

## 2019-10-08 NOTE — PLAN OF CARE
10/08/19 1039   Advance Directives (For Healthcare)   Advance Directive  (If Adv Dir status is received, view document under Adv Dir in header or Chart Review Media tab) Patient does not have Advance Directive, requests information.     Mr Quan has completed an advanced directive and it will be scanned into Epic. He is a full code.

## 2019-10-08 NOTE — CONSULTS
[facility]  Neurology  Consult Note    Patient Name: James Quan Jr.  MRN: [id number]  Admission Date: 10/7/2019  Hospital Length of Stay: 1 days  Code Status: Full Code   Attending Provider: Roz Fulton MD   Consulting Provider: Tr Higginbotham MD  Primary Care Physician: Alex Mcallister MD  Principal Problem:Ataxia    Inpatient consult to Neurology  Consult performed by: Tr Higginbotham MD  Consult ordered by: Roz Fulton MD         Subjective:     Chief Complaint:  Dizziness     HPI:   James Quan Jr. is a 74 y.o. male who presented to the ER yesterday with dizziness, weakness, and complaint that his eyes were jumping.  Teleneurology consult was completed and felt basilar stem occlusion needed to be ruled out. The patient was kept for further observation, CTA, and echo.    Of note, he was in the ER a few days ago with UTI symptoms. Cardiology is consulted this admission given his dizziness, pacemaker history, and mild bradycardia in the ER       He reports he was pale and eyes were jumping and he could not walk with dizziness started. He felt movement made this work.  Symptoms resolved since ER admission    He had one hour of symptoms starting at 12:15 pm yesterday    Symptoms worsened with looking and turning left       Past Medical History:   Diagnosis Date    Anemia     Aplasia bone marrow     Asthma     CA of prostate     CML (chronic myeloid leukemia)     Coronary artery disease     Heart attack 6/17/2014    Heart failure     Hyperlipidemia     Hypertension     Hypothyroidism     Kidney stones     Prostate cancer     Thyroid disease     Urinary incontinence        Past Surgical History:   Procedure Laterality Date    APPENDECTOMY  1966    ATRIAL CARDIAC PACEMAKER INSERTION  10/2018    CHOLECYSTECTOMY  1980    CORONARY ARTERY BYPASS GRAFT  1988    CORONARY STENT PLACEMENT  1990    CYSTOSCOPY      heart attack  6/17/2014     PROSTATE SURGERY      TURP    radiation      for ca prostate    TONSILLECTOMY      As child    TRANSURETHRAL RESECTION OF PROSTATE      x3    TRANSURETHRAL RESECTION OF PROSTATE      urethral dilation      VASECTOMY  1979       Review of patient's allergies indicates:   Allergen Reactions    Niacin preparations Rash    Bactrim [sulfamethoxazole-trimethoprim] Hives and Itching     I have reviewed all of this patient's past medical and surgical histories as well as family and social histories and active allergies and medications as documented in the electronic medical record.      No current facility-administered medications on file prior to encounter.      Current Outpatient Medications on File Prior to Encounter   Medication Sig    ascorbic acid, vitamin C, (VITAMIN C) 100 MG tablet Take 100 mg by mouth once daily.    aspirin (ECOTRIN) 81 MG EC tablet Take 81 mg by mouth once daily.    atorvastatin (LIPITOR) 40 MG tablet Take 40 mg by mouth once daily.     b complex vitamins tablet Take 1 tablet by mouth once daily.    beta carotene 88756 UNIT capsule Take 10,000 Units by mouth once daily.     catheter (BARD RUBBER UTILITY CATHETER) 14 Fr Misc 1 Units by Misc.(Non-Drug; Combo Route) route every 7 days. (Patient taking differently: 1 Units by Misc.(Non-Drug; Combo Route) route every other day. )    cephALEXin (KEFLEX) 500 MG capsule Take 1 capsule (500 mg total) by mouth every 8 (eight) hours. for 7 days    cyanocobalamin (VITAMIN B-12) 1000 MCG tablet Take 1,000 mcg by mouth nightly.     ferrous sulfate 325 (65 FE) MG EC tablet Take 325 mg by mouth once daily.    fish oil-omega-3 fatty acids 300-1,000 mg capsule Take by mouth once daily.    folic acid (FOLVITE) 400 MCG tablet Take 400 mcg by mouth once daily.    GLEEVEC 400 mg Tab 400 mg once daily.     isosorbide mononitrate (IMDUR) 30 MG 24 hr tablet Take 30 mg by mouth once daily.    levothyroxine (SYNTHROID) 100 MCG tablet TAKE 1 TABLET  EVERY DAY    losartan (COZAAR) 100 MG tablet Take 1 tablet (100 mg total) by mouth once daily.    lycopene 10 mg Cap Take 1 capsule by mouth nightly.     NITROSTAT 0.4 mg SL tablet Place 0.4 mg under the tongue every 5 (five) minutes as needed.     PRASTERONE, DHEA, (DHEA ORAL) Take 25 mg by mouth once daily. 50 mg. tablet     RANEXA 1,000 mg Tb12 Take 500 mg by mouth 2 (two) times daily.     vitamin D (VITAMIN D3) 1000 units Tab Take 2,000 Units by mouth once daily.     Family History     Problem Relation (Age of Onset)    Diabetes Father        Tobacco Use    Smoking status: Former Smoker     Packs/day: 1.00     Years: 25.00     Pack years: 25.00     Types: Cigarettes     Last attempt to quit: 1988     Years since quittin.4    Smokeless tobacco: Never Used   Substance and Sexual Activity    Alcohol use: No    Drug use: No    Sexual activity: Yes     Partners: Female     Review of Systems   Constitutional: Negative for fever.   HENT: Negative for ear pain, nosebleeds and tinnitus.    Eyes: Negative for photophobia.   Cardiovascular: Negative for leg swelling.   Gastrointestinal: Negative for blood in stool.   Genitourinary: Negative for hematuria.   Musculoskeletal: Negative for gait problem.   Skin: Negative for wound.   Neurological: Negative for tremors and facial asymmetry.   Psychiatric/Behavioral: Negative for confusion.     Objective:     Vital Signs (Most Recent):  Temp: 98 °F (36.7 °C) (10/08/19 0713)  Pulse: 60 (10/08/19 0800)  Resp: 16 (10/08/19 0713)  BP: (!) 156/73 (10/08/19 0713)  SpO2: 98 % (10/08/19 0735) Vital Signs (24h Range):  Temp:  [96.3 °F (35.7 °C)-98 °F (36.7 °C)] 98 °F (36.7 °C)  Pulse:  [51-72] 60  Resp:  [14-20] 16  SpO2:  [97 %-100 %] 98 %  BP: (129-166)/(60-73) 156/73     Weight: 73.5 kg (162 lb 0.6 oz)  Body mass index is 26.15 kg/m².    Physical Exam         Gen Appearance, well developed/nourished in no apparent distress  CV: 2+ distal pulses with no edema or  swelling  Neuro:  MS: Awake, alert, oriented to place, person, time, situation. Sustains attention. Recent/remote memory intact, Language is full to spontaneous speech/repetition/naming/comprehension. Fund of Knowledge is full  CN: Optic discs are flat with normal vasculature, PERRL, Extraoccular movements and visual fields are full but there is left lateral non-sustained nystagmus. Normal facial sensation and strength, Hearing symmetric, Tongue and Palate are midline and strong. Shoulder Shrug symmetric and strong.  Motor: Normal bulk, tone, no abnormal movements. 5/5 strength bilateral upper/lower extremities with 2+ reflexes and bilateral plantar response  Sensory: symmetric to light touch, pain, temp, and vibration/proprioception. Romberg negative  Cerebellar: Finger-nose,Heal-shin, Rapid alternating movements intact  Gait: Normal stance, no ataxia    Significant Labs: CMP, CBC this am reviewed  CK, Troponin normal at admission    Significant Imaging: CT head 10/7/2019: No acute intracranial findings.    Age-appropriate cerebral volume loss with mild moderate patchy decreased attenuation supratentorial white matter while nonspecific suggestive for chronic ischemic change.    No evidence for acute intracranial hemorrhage.  Clinical correlation and further evaluation as warranted\    10/7/2019 CTA head and neck: No acute abnormality. No high-grade stenosis or major vessel occlusion.    10/8/2019 Carotid US: Pending     10/8/2019 Echo: Pending    10/8/19 EKG: atrial pacer    Assessment and Plan:     Dizziness  -His mild non-sustain left lateral Nystagmus without any cranial nerve or other deficit on exam currently suggests peripherally mediated vertigo. He had  vertigo worse with movement yesterday which is resolved  -CTA head and neck reassuring  -He can't have an MRI at this facility (has an MRI compatible pacemaker), but further neuroimaging is not needed at this time unless new or worse symptoms as reviewed  with the patient  -He is pending PPM  Interrogation per cardiology and evaluation of his mild bradycardia, but I feel his symptoms localize to the left vestibular nerve  -Continue ASA and statin for CVA prevention. Echo pending.   -Will need PT for vestibular rehab if this recurs. If new or worsening symptoms, an MRI can be done at another facility.   -Neurology clinic follow up needed in 2-3 weeks    The above plan of care was reviewed with the patient who agrees and he anticipates D/c today.     Tr Higginbotham MD  Neurology  [facility]

## 2019-10-08 NOTE — PLAN OF CARE
Patient resting with no complaints at this time. Up with stand by assist in room, free from fall/injury. VS stable. A&Ox4 SB-SR on tele. Patient asking if pacemaker can be interrogated. No dizziness or nausea reported. No acute changes noted. Plan of care reviewed and agreed upon.        Problem: Fall Injury Risk  Goal: Absence of Fall and Fall-Related Injury  Outcome: Ongoing, Progressing     Problem: Adult Inpatient Plan of Care  Goal: Plan of Care Review  Outcome: Ongoing, Progressing  Goal: Patient-Specific Goal (Individualization)  Outcome: Ongoing, Progressing  Goal: Absence of Hospital-Acquired Illness or Injury  Outcome: Ongoing, Progressing  Goal: Optimal Comfort and Wellbeing  Outcome: Ongoing, Progressing  Goal: Readiness for Transition of Care  Outcome: Ongoing, Progressing  Goal: Rounds/Family Conference  Outcome: Ongoing, Progressing

## 2019-10-08 NOTE — ASSESSMENT & PLAN NOTE
R/O basilar lesion.  Monitoring tonight.  Seems more like vertigo.  Dr. Higginbotham consulted.  Vessels to posterior circulation okay.  Can get MRI according to Dr. Toscano's nurse. Just need cardiology to evaluate pacer post procedure to make sure its still functioning normally.   Will get MRI to rule out posterior CVA   Dizziness completely resolved at this time.  If it recurs may consider antivert vs scopolamine patch

## 2019-10-08 NOTE — PLAN OF CARE
10/08/19 1040   TALLEY Message   Medicare Outpatient and Observation Notification regarding financial responsibility Given to patient/caregiver;Explained to patient/caregiver;Signed/date by patient/caregiver   Date TALLEY was signed 10/08/19   Time TALLEY was signed 101

## 2019-10-08 NOTE — DISCHARGE SUMMARY
[facility] Hospital Medicine  Discharge Summary      Patient Name: James Quan Jr.  MRN: [id number]  Admission Date: 10/7/2019  Hospital Length of Stay: 1 days  Discharge Date and Time:  10/08/2019 12:31 PM  Attending Physician: Roz Fulton MD   Discharging Provider: Radha Mitchell NP  Primary Care Provider: Alex Mcallister MD      HPI:   74 year old male with known cad, bradycardia (s/p pacer), prostate cancer and CLL as well as recent urologic issues and recurrent UTI comes in with c/o dizziness that started at 12:15 today. He was seen in the ER here 2 days ago and started on abx for a symptomatic uti. He notes that he went to the ER at [place] the next day because he was still having symptoms and his abx were adjusted. He was doing well this morning and was cooking food when he started getting dizzy, though. At that point, he asked his wife to take his place and he went to sit down. She notes that his eyes were 'shifting quickly back and forth' and he looked very pale. He sat still for a moment and felt better. However, he could not get up to get to the car to go to the ER, he had to crawl there as with movement his dizziness was much worse. At the time of the dizziness he had diaphoresis and slight pain in his head, but no other symptoms. No recent URI symptoms. In the ER he was noted to by bradycardic with HR 51.    * No surgery found *      Hospital Course:   Telestroke called. CTA completed. Normal. Recommended that he stay for obs and MRI in morning (he can't do this - he has a pacer). Dr. Higginbotham consulted.     10/7/19 NAD over night. No dizziness or nausea. But still noting symptoms on exam   HR 51-60. Pt requesting PPM interrogation. Cards consulted   Blood cultures negative thus far; has chronic UTI- Urine culture in process  Last urine culture 7 days ago noted + Ecoli; Pt's getting  home Rx- cefpodoxime continued   Pt notes worsening dizziness with  turning head to the left; Symptoms suggestive of vertigo but will get MRI  out CVA- R/O Cerebellar /vestibular, pt able to have MRI per cardiology with new Pacemaker        Consults:   Consults (From admission, onward)        Status Ordering Provider     Inpatient consult to Cardiology  Once     Provider:  Harshad Saucedo MD    Acknowledged DEE PORTILLO     Inpatient consult to Neurology  Once     Provider:  Tr Higginbotham MD    Completed WILLIAMS TUTTLE     Inpatient consult to Telemedicine-Stroke  Once     Provider:  Paul Mota MD    Acknowledged WILLIAMS TUTTLE          * Ataxia    Rule out CVA  MRI pending- if normal will D/C home with tx for Vertigo   Unable to obtain MRI at this facility but cleared by neurology for D/C   His mild non-sustain left lateral Nystagmus without any cranial nerve or other deficit on exam currently suggests peripherally mediated vertigo. He had  vertigo worse with movement yesterday which is resolved  -CTA head and neck reassuring  -He can't have an MRI at this facility (has an MRI compatible pacemaker), but further neuroimaging is not needed at this time unless new or worse symptoms as reviewed with the patient  -He is pending PPM  Interrogation per cardiology and evaluation of his mild bradycardia, but I feel his symptoms localize to the left vestibular nerve  -Continue ASA and statin for CVA prevention. Echo pending.   -Will need PT for vestibular rehab if this recurs. If new or worsening symptoms, an MRI can be done at another facility.   -Neurology clinic follow up needed in 2-3 weeks       Dizziness  R/O basilar lesion.  Monitoring tonight.  Seems more like vertigo.  Dr. Higginbotham consulted.  Vessels to posterior circulation okay.  Can get MRI according to Dr. Toscano's nurse. Just need cardiology to evaluate pacer post procedure to make sure its still functioning normally.   Will get MRI to rule out posterior CVA   Dizziness completely resolved at  this time.  If it recurs may consider antivert vs scopolamine patch  Per neurology eval this PM;   His mild non-sustain left lateral Nystagmus without any cranial nerve or other deficit on exam currently suggests peripherally mediated vertigo. He had  vertigo worse with movement yesterday which is resolved  -CTA head and neck reassuring  -He can't have an MRI at this facility (has an MRI compatible pacemaker), but further neuroimaging is not needed at this time unless new or worse symptoms as reviewed with the patient  -He is pending PPM  Interrogation per cardiology and evaluation of his mild bradycardia, but I feel his symptoms localize to the left vestibular nerve  -Continue ASA and statin for CVA prevention. Echo pending.   -Will need PT for vestibular rehab if this recurs. If new or worsening symptoms, an MRI can be done at another facility.   -Neurology clinic follow up needed in 2-3 weeks       Weakness  resolved      Essential hypertension  Hold Bp meds and allow for permissive htn overnight.  Is normal at this time.  10/8/19  - 156 systolic     Acute cystitis without hematuria  Pt's getting  home Rx- cefpodoxime continued   Urine culture pending  Blood culture negative x 1d    S/P placement of cardiac pacemaker    Consult cardiology for PPM interrogation  D/c home after this if ok with cards  Echo also pending     Coronary artery disease involving coronary bypass graft of native heart without angina pectoris  Resume aspirin.  BB and losartan on hold tonight.  Ranexa to be given.  Monitor BPs.    Switch from lipitor to crestor.- due lt leg cramps; discussed trial crestor       Hypothyroidism due to acquired atrophy of thyroid    Continue synthroid       Final Active Diagnoses:    Diagnosis Date Noted POA    PRINCIPAL PROBLEM:  Ataxia [R27.0] 10/08/2019 Yes    Weakness [R53.1] 10/07/2019 Yes    Dizziness [R42] 10/07/2019 Yes    Essential hypertension [I10] 05/22/2019 Yes    Acute cystitis without  hematuria [N30.00] 05/20/2019 Yes    S/P placement of cardiac pacemaker [Z95.0] 01/29/2019 Yes    Coronary artery disease involving coronary bypass graft of native heart without angina pectoris [I25.810] 02/07/2017 Yes    Hypothyroidism due to acquired atrophy of thyroid [E03.4] 03/15/2016 Yes      Problems Resolved During this Admission:       Discharged Condition: stable    Disposition: Home or Self Care    Follow Up:  Follow-up Information     Schedule an appointment as soon as possible for a visit with Alex Mcallister MD.    Specialty:  Family Medicine  Why:  1 week   Contact information:  [facility]   [facility] [address]  [phone]             Schedule an appointment as soon as possible for a visit with Tr Higginbotham MD.    Specialty:  Neurology  Why:  3 weeks per Dr. Higginbotham   Contact information:  [address]  [facility] [address]  [phone]             Kervin Toscano Iii, MD.    Specialty:  Cardiothoracic Surgery  Why:  F/U with [facility] cards or Dr. Blanco after PPM interrogation  Contact information:  [address]  [address]  [facility] [address]  [phone]                 Patient Instructions:   No discharge procedures on file.    Significant Diagnostic Studies:   Significant Labs:   CBC:        Recent Labs   Lab 10/07/19  1418 10/08/19  0545   WBC 11.99 7.79   HGB 11.1* 10.8*   HCT 33.5* 32.8*    315      CMP:        Recent Labs   Lab 10/07/19  1418 10/08/19  0545    142   K 4.0 4.1    107   CO2 25 29   * 101   BUN 17 14   CREATININE 1.3 1.1   CALCIUM 9.0 9.1   PROT 6.8 6.3   ALBUMIN 3.4* 3.0*   BILITOT 0.5 0.4   ALKPHOS 70 75   AST 20 17   ALT 18 16   ANIONGAP 9 6*   EGFRNONAA 54* >60      TSH:       Recent Labs   Lab 10/07/19  1418   TSH 1.758      Urine Culture: No results for input(s): LABURIN in the last 48 hours.  Urine Studies:       Recent Labs   Lab 10/07/19  1819   COLORU Yellow   APPEARANCEUA Clear   PHUR 6.0    SPECGRAV <=1.005*   PROTEINUA Negative   GLUCUA Negative   KETONESU Negative   BILIRUBINUA Negative   OCCULTUA 1+*   NITRITE Negative   UROBILINOGEN Negative   LEUKOCYTESUR 1+*   RBCUA 7*   WBCUA 50*   BACTERIA Rare         Significant Imaging: I have reviewed all pertinent imaging results/findings within the past 24 hours.   CXR  Left-sided pacer device is again seen.  The lungs are clear.  The heart is normal in size.  Median sternotomy wires are midline.  Skeletal structures are intact.     CTA  No acute abnormality. No high-grade stenosis or major vessel occlusion.     CT:  No acute intracranial findings.    Age-appropriate cerebral volume loss with mild moderate patchy decreased attenuation supratentorial white matter while nonspecific suggestive for chronic ischemic change.    No evidence for acute intracranial hemorrhage.  Clinical correlation and further evaluation as warranted.         Pending Diagnostic Studies:     Procedure Component Value Units Date/Time    Echo Color Flow Doppler? Yes [[id number]] Resulted:  10/08/19 1031    Order Status:  Sent Lab Status:  In process Updated:  10/08/19 1031     BSA 1.85 m2     US Carotid Bilateral [[id number]]     Order Status:  Sent Lab Status:  No result          Medications:  Reconciled Home Medications:      Medication List      START taking these medications    meclizine 25 mg tablet  Commonly known as:  ANTIVERT  Take 1 tablet (25 mg total) by mouth 3 (three) times daily as needed.     rosuvastatin 10 MG tablet  Commonly known as:  CRESTOR  Take 1 tablet (10 mg total) by mouth every evening.  Replaces:  atorvastatin 40 MG tablet        CHANGE how you take these medications    catheter 14 Fr Misc  Commonly known as:  BARD RUBBER UTILITY CATHETER  1 Units by Misc.(Non-Drug; Combo Route) route every 7 days.  What changed:  when to take this        CONTINUE taking these medications    aspirin 81 MG EC tablet  Commonly known as:  ECOTRIN  Take 81 mg by mouth once  daily.     b complex vitamins tablet  Take 1 tablet by mouth once daily.     beta carotene 17012 UNIT capsule  Take 10,000 Units by mouth once daily.     cefpodoxime 200 MG tablet  Commonly known as:  VANTIN  Take 200 mg by mouth 2 (two) times daily.     cyanocobalamin 1000 MCG tablet  Commonly known as:  VITAMIN B-12  Take 1,000 mcg by mouth nightly.     DHEA ORAL  Take 25 mg by mouth once daily. 50 mg. tablet     ferrous sulfate 325 (65 FE) MG EC tablet  Take 325 mg by mouth once daily.     fish oil-omega-3 fatty acids 300-1,000 mg capsule  Take by mouth once daily.     folic acid 400 MCG tablet  Commonly known as:  FOLVITE  Take 400 mcg by mouth once daily.     GLEEVEC 400 MG Tab  Generic drug:  imatinib  400 mg once daily.     isosorbide mononitrate 30 MG 24 hr tablet  Commonly known as:  IMDUR  Take 30 mg by mouth once daily.     levothyroxine 100 MCG tablet  Commonly known as:  SYNTHROID  TAKE 1 TABLET EVERY DAY     losartan 100 MG tablet  Commonly known as:  COZAAR  Take 1 tablet (100 mg total) by mouth once daily.     lycopene 10 mg Cap  Take 1 capsule by mouth nightly.     NITROSTAT 0.4 MG SL tablet  Generic drug:  nitroGLYCERIN  Place 0.4 mg under the tongue every 5 (five) minutes as needed.     RANEXA 1,000 mg Tb12  Generic drug:  ranolazine  Take 500 mg by mouth 2 (two) times daily.     VITAMIN C 100 MG tablet  Generic drug:  ascorbic acid (vitamin C)  Take 100 mg by mouth once daily.     vitamin D 1000 units Tab  Commonly known as:  VITAMIN D3  Take 2,000 Units by mouth once daily.        STOP taking these medications    atorvastatin 40 MG tablet  Commonly known as:  LIPITOR  Replaced by:  rosuvastatin 10 MG tablet     cephALEXin 500 MG capsule  Commonly known as:  KEFLEX            Indwelling Lines/Drains at time of discharge:   Lines/Drains/Airways     None                 Time spent on the discharge of patient: 30 minutes  Patient was seen and examined on the date of discharge and determined to be  suitable for discharge.         Radha Mitchell NP  Department of Hospital Medicine  [facility]

## 2019-10-08 NOTE — HPI
74 year old male with known CAD, bradycardia, PPM, prostate cancer and CLL as well as recent urologic issues and recurrent UTI comes in with c/o dizziness and dizziness. He was seen in the ER here 2 days ago and started on abx for a symptomatic UTI.  In the ER he was noted to by bradycardic with HR 51. [facility] consulted for PPM interrogation and eval.

## 2019-10-08 NOTE — CONSULTS
[facility]  Cardiology  Consult Note    Patient Name: James Quan Jr.  MRN: [id number]  Admission Date: 10/7/2019  Hospital Length of Stay: 1 days  Code Status: Full Code   Attending Provider: Roz Fulton MD   Consulting Provider: Maurilio Rodgers NP  Primary Care Physician: Alex Mcallister MD  Principal Problem:Weakness    Patient information was obtained from patient, past medical records and ER records.     Consults  Subjective:     Chief Complaint:  Weakness, dizziness     HPI:   74 year old male with known CAD, bradycardia, PPM, prostate cancer and CLL as well as recent urologic issues and recurrent UTI comes in with c/o dizziness and dizziness. He was seen in the ER here 2 days ago and started on abx for a symptomatic UTI.  In the ER he was noted to by bradycardic with HR 51. [facility] consulted for PPM interrogation and eval.    Past Medical History:   Diagnosis Date    Anemia     Aplasia bone marrow     Asthma     CA of prostate     CML (chronic myeloid leukemia)     Coronary artery disease     Heart attack 6/17/2014    Heart failure     Hyperlipidemia     Hypertension     Hypothyroidism     Kidney stones     Prostate cancer     Thyroid disease     Urinary incontinence        Past Surgical History:   Procedure Laterality Date    APPENDECTOMY  1966    ATRIAL CARDIAC PACEMAKER INSERTION  10/2018    CHOLECYSTECTOMY  1980    CORONARY ARTERY BYPASS GRAFT  1988    CORONARY STENT PLACEMENT  1990    CYSTOSCOPY      heart attack  6/17/2014    PROSTATE SURGERY      TURP    radiation      for ca prostate    TONSILLECTOMY      As child    TRANSURETHRAL RESECTION OF PROSTATE      x3    TRANSURETHRAL RESECTION OF PROSTATE      urethral dilation      VASECTOMY  1979       Review of patient's allergies indicates:   Allergen Reactions    Niacin preparations Rash    Bactrim [sulfamethoxazole-trimethoprim] Hives and Itching       No current facility-administered  medications on file prior to encounter.      Current Outpatient Medications on File Prior to Encounter   Medication Sig    ascorbic acid, vitamin C, (VITAMIN C) 100 MG tablet Take 100 mg by mouth once daily.    aspirin (ECOTRIN) 81 MG EC tablet Take 81 mg by mouth once daily.    atorvastatin (LIPITOR) 40 MG tablet Take 40 mg by mouth once daily.     b complex vitamins tablet Take 1 tablet by mouth once daily.    beta carotene 57650 UNIT capsule Take 10,000 Units by mouth once daily.     catheter (BARD RUBBER UTILITY CATHETER) 14 Fr Misc 1 Units by Misc.(Non-Drug; Combo Route) route every 7 days. (Patient taking differently: 1 Units by Misc.(Non-Drug; Combo Route) route every other day. )    cephALEXin (KEFLEX) 500 MG capsule Take 1 capsule (500 mg total) by mouth every 8 (eight) hours. for 7 days    cyanocobalamin (VITAMIN B-12) 1000 MCG tablet Take 1,000 mcg by mouth nightly.     ferrous sulfate 325 (65 FE) MG EC tablet Take 325 mg by mouth once daily.    fish oil-omega-3 fatty acids 300-1,000 mg capsule Take by mouth once daily.    folic acid (FOLVITE) 400 MCG tablet Take 400 mcg by mouth once daily.    GLEEVEC 400 mg Tab 400 mg once daily.     isosorbide mononitrate (IMDUR) 30 MG 24 hr tablet Take 30 mg by mouth once daily.    levothyroxine (SYNTHROID) 100 MCG tablet TAKE 1 TABLET EVERY DAY    losartan (COZAAR) 100 MG tablet Take 1 tablet (100 mg total) by mouth once daily.    lycopene 10 mg Cap Take 1 capsule by mouth nightly.     NITROSTAT 0.4 mg SL tablet Place 0.4 mg under the tongue every 5 (five) minutes as needed.     PRASTERONE, DHEA, (DHEA ORAL) Take 25 mg by mouth once daily. 50 mg. tablet     RANEXA 1,000 mg Tb12 Take 500 mg by mouth 2 (two) times daily.     vitamin D (VITAMIN D3) 1000 units Tab Take 2,000 Units by mouth once daily.     Family History     Problem Relation (Age of Onset)    Diabetes Father        Tobacco Use    Smoking status: Former Smoker     Packs/day: 1.00      Years: 25.00     Pack years: 25.00     Types: Cigarettes     Last attempt to quit: 1988     Years since quittin.4    Smokeless tobacco: Never Used   Substance and Sexual Activity    Alcohol use: No    Drug use: No    Sexual activity: Yes     Partners: Female     Review of Systems   Constitution: Positive for malaise/fatigue. Negative for chills, decreased appetite, diaphoresis, fever, night sweats, weight gain and weight loss.   HENT: Negative.    Eyes: Negative.    Cardiovascular: Negative.    Respiratory: Negative.    Endocrine: Negative.    Hematologic/Lymphatic: Negative.    Skin: Negative.    Musculoskeletal: Positive for muscle weakness. Negative for arthritis, back pain, falls, gout, joint pain, joint swelling, muscle cramps, myalgias, neck pain and stiffness.   Gastrointestinal: Negative.    Genitourinary: Negative.    Neurological: Positive for dizziness and weakness. Negative for aphonia, brief paralysis, difficulty with concentration, disturbances in coordination, excessive daytime sleepiness, focal weakness, headaches, light-headedness, loss of balance, numbness, paresthesias, seizures, sensory change, tremors and vertigo.   Psychiatric/Behavioral: Negative.    Allergic/Immunologic: Negative.      Objective:     Vital Signs (Most Recent):  Temp: 98 °F (36.7 °C) (10/08/19 0713)  Pulse: 60 (10/08/19 0713)  Resp: 16 (10/08/19 0713)  BP: (!) 156/73 (10/08/19 0713)  SpO2: 98 % (10/08/19 0735) Vital Signs (24h Range):  Temp:  [96.3 °F (35.7 °C)-98 °F (36.7 °C)] 98 °F (36.7 °C)  Pulse:  [51-72] 60  Resp:  [14-20] 16  SpO2:  [97 %-100 %] 98 %  BP: (129-166)/(60-73) 156/73     Weight: 73.5 kg (162 lb 0.6 oz)  Body mass index is 26.15 kg/m².    SpO2: 98 %  O2 Device (Oxygen Therapy): room air    No intake or output data in the 24 hours ending 10/08/19 0839    Lines/Drains/Airways     Peripheral Intravenous Line                 Peripheral IV - Single Lumen 10/07/19 1415 20 G Right Antecubital less  than 1 day                Physical Exam   Constitutional: He is oriented to person, place, and time. He appears well-developed and well-nourished. No distress.   HENT:   Head: Normocephalic and atraumatic.   Neck: Normal range of motion. Neck supple. No JVD present. No tracheal deviation present. No thyromegaly present.   Cardiovascular: Regular rhythm and intact distal pulses. Exam reveals gallop. Exam reveals no friction rub.   Murmur (2/6 ALDO) heard.  Pulmonary/Chest: Effort normal and breath sounds normal. No stridor. No respiratory distress. He has no wheezes. He has no rales. He exhibits no tenderness.   Abdominal: Soft. Bowel sounds are normal. He exhibits no distension and no mass. There is no tenderness. There is no rebound and no guarding.   Musculoskeletal: Normal range of motion. He exhibits no edema, tenderness or deformity.   Neurological: He is alert and oriented to person, place, and time.   Skin: Skin is warm and dry. No rash noted. He is not diaphoretic. No erythema. No pallor.   Psychiatric: He has a normal mood and affect. His behavior is normal. Judgment and thought content normal.       Significant Labs:   ABG: No results for input(s): PH, PCO2, HCO3, POCSATURATED, BE in the last 48 hours., Blood Culture:   Recent Labs   Lab 10/07/19  1419   LABBLOO No Growth to date  No Growth to date   , BMP:   Recent Labs   Lab 10/07/19  1418 10/08/19  0545   * 101    142   K 4.0 4.1    107   CO2 25 29   BUN 17 14   CREATININE 1.3 1.1   CALCIUM 9.0 9.1   MG 1.7  --    , CMP   Recent Labs   Lab 10/07/19  1418 10/08/19  0545    142   K 4.0 4.1    107   CO2 25 29   * 101   BUN 17 14   CREATININE 1.3 1.1   CALCIUM 9.0 9.1   PROT 6.8 6.3   ALBUMIN 3.4* 3.0*   BILITOT 0.5 0.4   ALKPHOS 70 75   AST 20 17   ALT 18 16   ANIONGAP 9 6*   ESTGFRAFRICA >60 >60   EGFRNONAA 54* >60   , CBC   Recent Labs   Lab 10/07/19  1418 10/08/19  0545   WBC 11.99 7.79   HGB 11.1* 10.8*   HCT 33.5*  32.8*    315   , INR No results for input(s): INR, PROTIME in the last 48 hours., Lipid Panel No results for input(s): CHOL, HDL, LDLCALC, TRIG, CHOLHDL in the last 48 hours.,   Pathology Results  (Last 10 years)    None      , Troponin   Recent Labs   Lab 10/07/19  1418   TROPONINI <0.006   , All pertinent lab results from the last 24 hours have been reviewed. and   Recent Lab Results       10/08/19  0545   10/07/19  2134   10/07/19  1819   10/07/19  1743   10/07/19  1419        Albumin 3.0             Alkaline Phosphatase 75             ALT 16             Anion Gap 6             Appearance, UA     Clear         AST 17             Bacteria, UA     Rare         Baso # 0.03             Basophil% 0.4             Bilirubin (UA)     Negative         BILIRUBIN TOTAL 0.4  Comment:  For infants and newborns, interpretation of results should be based  on gestational age, weight and in agreement with clinical  observations.  Premature Infant recommended reference ranges:  Up to 24 hours.............<8.0 mg/dL  Up to 48 hours............<12.0 mg/dL  3-5 days..................<15.0 mg/dL  6-29 days.................<15.0 mg/dL               Blood Culture, Routine         No Growth to date[P]              No Growth to date[P]     BNP               BUN, Bld 14             Calcium 9.1             Chloride 107             CO2 29             Color, UA     Yellow         CPK               CPK MB               Creatinine 1.1             Differential Method Automated             eGFR if  >60             eGFR if non  >60  Comment:  Calculation used to obtain the estimated glomerular filtration  rate (eGFR) is the CKD-EPI equation.                Eos # 0.1             Eosinophil% 1.4             Glucose 101             Glucose, UA     Negative         Gran # (ANC) 5.2             Gran% 66.1             Hematocrit 32.8             Hemoglobin 10.8             Immature Grans (Abs) 0.03  Comment:  Mild  elevation in immature granulocytes is non specific and   can be seen in a variety of conditions including stress response,   acute inflammation, trauma and pregnancy. Correlation with other   laboratory and clinical findings is essential.               Immature Granulocytes 0.4             Ketones, UA     Negative         Lactate, Giles   1.0  Comment:  Falsely low lactic acid results can be found in samples   containing >=13.0 mg/dL total bilirubin and/or >=3.5 mg/dL   direct bilirubin.     0.7  Comment:  Falsely low lactic acid results can be found in samples   containing >=13.0 mg/dL total bilirubin and/or >=3.5 mg/dL   direct bilirubin.         Leukocytes, UA     1+         Lipase               Lymph # 1.9             Lymph% 24.1             Magnesium               MB%               MCH 33.2             MCHC 32.9                          Microscopic Comment     SEE COMMENT  Comment:  Other formed elements not mentioned in the report are not   present in the microscopic examination.            Mono # 0.6             Mono% 7.6             MPV 9.5             NITRITE UA     Negative         nRBC 0             Occult Blood UA     1+         pH, UA     6.0         Phosphorus               Platelets 315             POCT Glucose               Potassium 4.1             PROTEIN TOTAL 6.3             Protein, UA     Negative  Comment:  Recommend a 24 hour urine protein or a urine   protein/creatinine ratio if globulin induced proteinuria is  clinically suspected.           RBC 3.25             RBC, UA     7         RDW 14.8             Sodium 142             Specific Gravity, UA     <=1.005         Specimen UA     Urine, Clean Catch         Troponin I               TSH               UROBILINOGEN UA     Negative         WBC, UA     50         WBC 7.79                              10/07/19  1418   10/07/19  1340        Albumin 3.4       Alkaline Phosphatase 70       ALT 18       Anion Gap 9       Appearance, UA          AST 20       Bacteria, UA         Baso # 0.05       Basophil% 0.4       Bilirubin (UA)         BILIRUBIN TOTAL 0.5  Comment:  For infants and newborns, interpretation of results should be based  on gestational age, weight and in agreement with clinical  observations.  Premature Infant recommended reference ranges:  Up to 24 hours.............<8.0 mg/dL  Up to 48 hours............<12.0 mg/dL  3-5 days..................<15.0 mg/dL  6-29 days.................<15.0 mg/dL         Blood Culture, Routine         BNP 85  Comment:  Values of less than 100 pg/ml are consistent with non-CHF populations.       BUN, Bld 17       Calcium 9.0       Chloride 106       CO2 25       Color, UA         CPK 80        80       CPK MB 2.2       Creatinine 1.3       Differential Method Automated       eGFR if  >60       eGFR if non  54  Comment:  Calculation used to obtain the estimated glomerular filtration  rate (eGFR) is the CKD-EPI equation.          Eos # 0.1       Eosinophil% 1.1       Glucose 138       Glucose, UA         Gran # (ANC) 9.2       Gran% 76.3       Hematocrit 33.5       Hemoglobin 11.1       Immature Grans (Abs) 0.07  Comment:  Mild elevation in immature granulocytes is non specific and   can be seen in a variety of conditions including stress response,   acute inflammation, trauma and pregnancy. Correlation with other   laboratory and clinical findings is essential.         Immature Granulocytes 0.6       Ketones, UA         Lactate, Giles 1.2  Comment:  Falsely low lactic acid results can be found in samples   containing >=13.0 mg/dL total bilirubin and/or >=3.5 mg/dL   direct bilirubin.         Leukocytes, UA         Lipase 56       Lymph # 1.7       Lymph% 14.3       Magnesium 1.7       MB% 2.8  Comment:  To be positive, the MB% must be greater than 5% AND the CK-MB  greater than 6.5 ng/mL. Values not in the reference interval,   but not qualifying as positive, should be considered  ""trace".         MCH 32.8       MCHC 33.1       MCV 99       Microscopic Comment         Mono # 0.9       Mono% 7.3       MPV 9.6       NITRITE UA         nRBC 0       Occult Blood UA         pH, UA         Phosphorus 2.9       Platelets 335       POCT Glucose   138     Potassium 4.0       PROTEIN TOTAL 6.8       Protein, UA         RBC 3.38       RBC, UA         RDW 14.7       Sodium 140       Specific Gravity, UA         Specimen UA         Troponin I <0.006  Comment:  The reference interval for Troponin I represents the 99th percentile   cutoff   for our facility and is consistent with 3rd generation assay   performance.         TSH 1.758       UROBILINOGEN UA         WBC, UA         WBC 11.99             Significant Imaging: CT scan: CT ABDOMEN PELVIS WITH CONTRAST: No results found for this visit on 10/07/19. and CT ABDOMEN PELVIS WITHOUT CONTRAST: No results found for this visit on 10/07/19., Echocardiogram: 2D echo with color flow doppler: No results found for this or any previous visit. and Transthoracic echo (TTE) complete (Cupid Only): No results found for this or any previous visit. and X-Ray: CXR: X-Ray Chest 1 View (CXR):   Results for orders placed or performed during the hospital encounter of 10/07/19   X-Ray Chest 1 View    Narrative    EXAMINATION:  XR CHEST 1 VIEW    CLINICAL HISTORY:  CHEST PAIN;    TECHNIQUE:  Single frontal view of the chest was performed.    COMPARISON:  05/19/2019    FINDINGS:  Left-sided pacer device is again seen.  The lungs are clear.  The heart is normal in size.  Median sternotomy wires are midline.  Skeletal structures are intact.      Impression    No acute process.      Electronically signed by: Abbi Du MD  Date:    10/07/2019  Time:    14:44    and X-Ray Chest PA and Lateral (CXR): No results found for this visit on 10/07/19. and KUB: X-Ray Abdomen AP 1 View (KUB): No results found for this visit on 10/07/19.    Assessment and Plan:     No notes have been filed " under this hospital service.  Service: Cardiology      VTE Risk Mitigation (From admission, onward)         Ordered     IP VTE HIGH RISK PATIENT  Once      10/07/19 1723     Place sequential compression device  Until discontinued      10/07/19 1723              Current Facility-Administered Medications   Medication    acetaminophen tablet 650 mg    aspirin EC tablet 81 mg    cefpodoxime tablet 200 mg    HYDROcodone-acetaminophen 5-325 mg per tablet 1 tablet    isosorbide mononitrate 24 hr tablet 30 mg    levothyroxine tablet 100 mcg    ondansetron injection 4 mg    pantoprazole EC tablet 40 mg    promethazine (PHENERGAN) 12.5 mg in dextrose 5 % 50 mL IVPB    ranolazine 12 hr tablet 500 mg    rosuvastatin tablet 10 mg    sodium chloride 0.9% flush 10 mL     ECHO 8/2019: EF 55%, LV diastolic function normal, no regurgitation, PA systolic pressure 24 mmHg    MPI 8/2017: abnormal consistent with prior infarction. Small fixed perfusion abnormality of mild intensity in the inferior region.       DX: New onset vertigo yesterday with clear vestibular signs    Bradycardia, rate 51 BPM, PPM  CAD, MPI 2017 with scar CABG x3 1999, C 2014 revealed occluded SVG to RCA,  of prox RCA, severe LCFX and LAD disease with patent stents to LAD and LCFX  HTN LVEF 55% 8/19  Dyslipidemia  CKD    Plan: Interrogate PPM  Echo with bubble study  Work up ENT/neuro causes for vertigo        Thank you for your consult. I will follow-up with patient. Please contact us if you have any additional questions.    Transcribed by  Maurilio Rodgers NP for Dr EDUAR Saucedo  Cardiology   [facility]    I attest that I have personally seen and examined this patient. I have reviewed and discussed the management in detail as outlined above.

## 2019-10-08 NOTE — ASSESSMENT & PLAN NOTE
-His mild non-sustain left lateral Nystagmus without any cranial nerve or other deficit on exam currently suggests peripherally mediated vertigo. He had  vertigo worse with movement yesterday which is resolved  -CTA head and neck reassuring  -He can't have an MRI at this facility (has an MRI compatible pacemaker), but further neuroimaging is not needed at this time unless new or worse symptoms as reviewed with the patient  -He is pending PPM  Interrogation per cardiology and evaluation of his mild bradycardia, but I feel his symptoms localize to the left vestibular nerve  -Continue ASA and statin for CVA prevention. Echo pending.   -Will need PT for vestibular rehab if this recurs. If new or worsening symptoms, an MRI can be done at another facility.   -Neurology clinic follow up needed in 2-3 weeks

## 2019-10-08 NOTE — PLAN OF CARE
10/08/19 1026   Discharge Assessment   Assessment Type Discharge Planning Reassessment     MR Quan is here for weakness and dizziness. Neurologist to see today to determine stroke versus vertigo. His wife is here with him and neither of them have any post acute care needs. Patient is requesting to complete an advanced directive. CM and SW to address today. CM contact information and discharge brochure given. Brochure checklist reviewed with patient and all questions answered. Discharge information sheet for vertigo and stroke given including signs and symptoms indicating return to ED or call to PCP. Compliance and understanding voiced.

## 2019-10-08 NOTE — SUBJECTIVE & OBJECTIVE
Past Medical History:   Diagnosis Date    Anemia     Aplasia bone marrow     Asthma     CA of prostate     CML (chronic myeloid leukemia)     Coronary artery disease     Heart attack 6/17/2014    Heart failure     Hyperlipidemia     Hypertension     Hypothyroidism     Kidney stones     Prostate cancer     Thyroid disease     Urinary incontinence        Past Surgical History:   Procedure Laterality Date    APPENDECTOMY  1966    ATRIAL CARDIAC PACEMAKER INSERTION  10/2018    CHOLECYSTECTOMY  1980    CORONARY ARTERY BYPASS GRAFT  1988    CORONARY STENT PLACEMENT  1990    CYSTOSCOPY      heart attack  6/17/2014    PROSTATE SURGERY      TURP    radiation      for ca prostate    TONSILLECTOMY      As child    TRANSURETHRAL RESECTION OF PROSTATE      x3    TRANSURETHRAL RESECTION OF PROSTATE      urethral dilation      VASECTOMY  1979       Review of patient's allergies indicates:   Allergen Reactions    Niacin preparations Rash    Bactrim [sulfamethoxazole-trimethoprim] Hives and Itching     I have reviewed all of this patient's past medical and surgical histories as well as family and social histories and active allergies and medications as documented in the electronic medical record.      No current facility-administered medications on file prior to encounter.      Current Outpatient Medications on File Prior to Encounter   Medication Sig    ascorbic acid, vitamin C, (VITAMIN C) 100 MG tablet Take 100 mg by mouth once daily.    aspirin (ECOTRIN) 81 MG EC tablet Take 81 mg by mouth once daily.    atorvastatin (LIPITOR) 40 MG tablet Take 40 mg by mouth once daily.     b complex vitamins tablet Take 1 tablet by mouth once daily.    beta carotene 28615 UNIT capsule Take 10,000 Units by mouth once daily.     catheter (BARD RUBBER UTILITY CATHETER) 14 Fr Misc 1 Units by Misc.(Non-Drug; Combo Route) route every 7 days. (Patient taking differently: 1 Units by Misc.(Non-Drug; Combo Route)  route every other day. )    cephALEXin (KEFLEX) 500 MG capsule Take 1 capsule (500 mg total) by mouth every 8 (eight) hours. for 7 days    cyanocobalamin (VITAMIN B-12) 1000 MCG tablet Take 1,000 mcg by mouth nightly.     ferrous sulfate 325 (65 FE) MG EC tablet Take 325 mg by mouth once daily.    fish oil-omega-3 fatty acids 300-1,000 mg capsule Take by mouth once daily.    folic acid (FOLVITE) 400 MCG tablet Take 400 mcg by mouth once daily.    GLEEVEC 400 mg Tab 400 mg once daily.     isosorbide mononitrate (IMDUR) 30 MG 24 hr tablet Take 30 mg by mouth once daily.    levothyroxine (SYNTHROID) 100 MCG tablet TAKE 1 TABLET EVERY DAY    losartan (COZAAR) 100 MG tablet Take 1 tablet (100 mg total) by mouth once daily.    lycopene 10 mg Cap Take 1 capsule by mouth nightly.     NITROSTAT 0.4 mg SL tablet Place 0.4 mg under the tongue every 5 (five) minutes as needed.     PRASTERONE, DHEA, (DHEA ORAL) Take 25 mg by mouth once daily. 50 mg. tablet     RANEXA 1,000 mg Tb12 Take 500 mg by mouth 2 (two) times daily.     vitamin D (VITAMIN D3) 1000 units Tab Take 2,000 Units by mouth once daily.     Family History     Problem Relation (Age of Onset)    Diabetes Father        Tobacco Use    Smoking status: Former Smoker     Packs/day: 1.00     Years: 25.00     Pack years: 25.00     Types: Cigarettes     Last attempt to quit: 1988     Years since quittin.4    Smokeless tobacco: Never Used   Substance and Sexual Activity    Alcohol use: No    Drug use: No    Sexual activity: Yes     Partners: Female     Review of Systems   Constitutional: Negative for fever.   HENT: Negative for ear pain, nosebleeds and tinnitus.    Eyes: Negative for photophobia.   Cardiovascular: Negative for leg swelling.   Gastrointestinal: Negative for blood in stool.   Genitourinary: Negative for hematuria.   Musculoskeletal: Negative for gait problem.   Skin: Negative for wound.   Neurological: Negative for tremors and  facial asymmetry.   Psychiatric/Behavioral: Negative for confusion.     Objective:     Vital Signs (Most Recent):  Temp: 98 °F (36.7 °C) (10/08/19 0713)  Pulse: 60 (10/08/19 0800)  Resp: 16 (10/08/19 0713)  BP: (!) 156/73 (10/08/19 0713)  SpO2: 98 % (10/08/19 0735) Vital Signs (24h Range):  Temp:  [96.3 °F (35.7 °C)-98 °F (36.7 °C)] 98 °F (36.7 °C)  Pulse:  [51-72] 60  Resp:  [14-20] 16  SpO2:  [97 %-100 %] 98 %  BP: (129-166)/(60-73) 156/73     Weight: 73.5 kg (162 lb 0.6 oz)  Body mass index is 26.15 kg/m².    Physical Exam         Gen Appearance, well developed/nourished in no apparent distress  CV: 2+ distal pulses with no edema or swelling  Neuro:  MS: Awake, alert, oriented to place, person, time, situation. Sustains attention. Recent/remote memory intact, Language is full to spontaneous speech/repetition/naming/comprehension. Fund of Knowledge is full  CN: Optic discs are flat with normal vasculature, PERRL, Extraoccular movements and visual fields are full but there is left lateral non-sustained nystagmus. Normal facial sensation and strength, Hearing symmetric, Tongue and Palate are midline and strong. Shoulder Shrug symmetric and strong.  Motor: Normal bulk, tone, no abnormal movements. 5/5 strength bilateral upper/lower extremities with 2+ reflexes and bilateral plantar response  Sensory: symmetric to light touch, pain, temp, and vibration/proprioception. Romberg negative  Cerebellar: Finger-nose,Heal-shin, Rapid alternating movements intact  Gait: Normal stance, no ataxia    Significant Labs: CMP, CBC this am reviewed  CK, Troponin normal at admission    Significant Imaging: CT head 10/7/2019: No acute intracranial findings.    Age-appropriate cerebral volume loss with mild moderate patchy decreased attenuation supratentorial white matter while nonspecific suggestive for chronic ischemic change.    No evidence for acute intracranial hemorrhage.  Clinical correlation and further evaluation as  warranted\    10/7/2019 CTA head and neck: No acute abnormality. No high-grade stenosis or major vessel occlusion.    10/8/2019 Carotid US: Pending     10/8/2019 Echo: Pending    10/8/19 EKG: atrial pacer

## 2019-10-08 NOTE — ASSESSMENT & PLAN NOTE
Hold Bp meds and allow for permissive htn overnight.  Is normal at this time.  10/8/19  - 156 systolic

## 2019-10-08 NOTE — SUBJECTIVE & OBJECTIVE
Past Medical History:   Diagnosis Date    Anemia     Aplasia bone marrow     Asthma     CA of prostate     CML (chronic myeloid leukemia)     Coronary artery disease     Heart attack 6/17/2014    Heart failure     Hyperlipidemia     Hypertension     Hypothyroidism     Kidney stones     Prostate cancer     Thyroid disease     Urinary incontinence        Past Surgical History:   Procedure Laterality Date    APPENDECTOMY  1966    ATRIAL CARDIAC PACEMAKER INSERTION  10/2018    CHOLECYSTECTOMY  1980    CORONARY ARTERY BYPASS GRAFT  1988    CORONARY STENT PLACEMENT  1990    CYSTOSCOPY      heart attack  6/17/2014    PROSTATE SURGERY      TURP    radiation      for ca prostate    TONSILLECTOMY      As child    TRANSURETHRAL RESECTION OF PROSTATE      x3    TRANSURETHRAL RESECTION OF PROSTATE      urethral dilation      VASECTOMY  1979       Review of patient's allergies indicates:   Allergen Reactions    Niacin preparations Rash    Bactrim [sulfamethoxazole-trimethoprim] Hives and Itching       No current facility-administered medications on file prior to encounter.      Current Outpatient Medications on File Prior to Encounter   Medication Sig    ascorbic acid, vitamin C, (VITAMIN C) 100 MG tablet Take 100 mg by mouth once daily.    aspirin (ECOTRIN) 81 MG EC tablet Take 81 mg by mouth once daily.    atorvastatin (LIPITOR) 40 MG tablet Take 40 mg by mouth once daily.     b complex vitamins tablet Take 1 tablet by mouth once daily.    beta carotene 65160 UNIT capsule Take 10,000 Units by mouth once daily.     catheter (BARD RUBBER UTILITY CATHETER) 14 Fr Misc 1 Units by Misc.(Non-Drug; Combo Route) route every 7 days. (Patient taking differently: 1 Units by Misc.(Non-Drug; Combo Route) route every other day. )    cephALEXin (KEFLEX) 500 MG capsule Take 1 capsule (500 mg total) by mouth every 8 (eight) hours. for 7 days    cyanocobalamin (VITAMIN B-12) 1000 MCG tablet Take 1,000 mcg by  mouth nightly.     ferrous sulfate 325 (65 FE) MG EC tablet Take 325 mg by mouth once daily.    fish oil-omega-3 fatty acids 300-1,000 mg capsule Take by mouth once daily.    folic acid (FOLVITE) 400 MCG tablet Take 400 mcg by mouth once daily.    GLEEVEC 400 mg Tab 400 mg once daily.     isosorbide mononitrate (IMDUR) 30 MG 24 hr tablet Take 30 mg by mouth once daily.    levothyroxine (SYNTHROID) 100 MCG tablet TAKE 1 TABLET EVERY DAY    losartan (COZAAR) 100 MG tablet Take 1 tablet (100 mg total) by mouth once daily.    lycopene 10 mg Cap Take 1 capsule by mouth nightly.     NITROSTAT 0.4 mg SL tablet Place 0.4 mg under the tongue every 5 (five) minutes as needed.     PRASTERONE, DHEA, (DHEA ORAL) Take 25 mg by mouth once daily. 50 mg. tablet     RANEXA 1,000 mg Tb12 Take 500 mg by mouth 2 (two) times daily.     vitamin D (VITAMIN D3) 1000 units Tab Take 2,000 Units by mouth once daily.     Family History     Problem Relation (Age of Onset)    Diabetes Father        Tobacco Use    Smoking status: Former Smoker     Packs/day: 1.00     Years: 25.00     Pack years: 25.00     Types: Cigarettes     Last attempt to quit: 1988     Years since quittin.4    Smokeless tobacco: Never Used   Substance and Sexual Activity    Alcohol use: No    Drug use: No    Sexual activity: Yes     Partners: Female     Review of Systems   Constitutional: Negative for chills and fever.   HENT: Negative for congestion, ear pain, postnasal drip, rhinorrhea, sore throat and trouble swallowing.    Eyes: Negative for redness and itching.   Respiratory: Negative for cough, shortness of breath and wheezing.    Cardiovascular: Negative for chest pain and palpitations.   Gastrointestinal: Negative for abdominal pain, diarrhea, nausea and vomiting.   Genitourinary: Negative for dysuria and frequency.        Groin pain   Skin: Negative for rash.   Neurological: Positive for dizziness. Negative for weakness and headaches.      Objective:     Vital Signs (Most Recent):  Temp: 98 °F (36.7 °C) (10/08/19 0713)  Pulse: 60 (10/08/19 0713)  Resp: 16 (10/08/19 0713)  BP: (!) 156/73 (10/08/19 0713)  SpO2: 99 % (10/08/19 0713) Vital Signs (24h Range):  Temp:  [96.3 °F (35.7 °C)-98 °F (36.7 °C)] 98 °F (36.7 °C)  Pulse:  [51-72] 60  Resp:  [14-20] 16  SpO2:  [97 %-100 %] 99 %  BP: (129-166)/(60-73) 156/73     Weight: 73.5 kg (162 lb 0.6 oz)  Body mass index is 26.15 kg/m².    Physical Exam   Constitutional: He is oriented to person, place, and time. He appears well-developed. No distress.   HENT:   Head: Normocephalic and atraumatic.   Ears clear. TMs clear    Eyes: Pupils are equal, round, and reactive to light. Conjunctivae are normal.   Neck: Normal range of motion. Neck supple. No thyromegaly present.   Cardiovascular: Normal rate, regular rhythm, normal heart sounds and intact distal pulses.   Pulmonary/Chest: Effort normal and breath sounds normal. No respiratory distress. He has no wheezes.   Abdominal: Soft. Bowel sounds are normal. There is no tenderness.   Musculoskeletal: Normal range of motion. He exhibits no edema.   Lymphadenopathy:     He has no cervical adenopathy.   Neurological: He is alert and oriented to person, place, and time. He displays normal reflexes. No cranial nerve deficit. Coordination normal.   FNF okay. No past pointing.  No Dysdiadocokinesia   Skin: Skin is warm and dry. No rash noted.   Psychiatric: He has a normal mood and affect. His behavior is normal.   Nursing note and vitals reviewed.        CRANIAL NERVES     CN III, IV, VI   Pupils are equal, round, and reactive to light.       Significant Labs:   CBC:   Recent Labs   Lab 10/07/19  1418 10/08/19  0545   WBC 11.99 7.79   HGB 11.1* 10.8*   HCT 33.5* 32.8*    315     CMP:   Recent Labs   Lab 10/07/19  1418 10/08/19  0545    142   K 4.0 4.1    107   CO2 25 29   * 101   BUN 17 14   CREATININE 1.3 1.1   CALCIUM 9.0 9.1   PROT 6.8 6.3    ALBUMIN 3.4* 3.0*   BILITOT 0.5 0.4   ALKPHOS 70 75   AST 20 17   ALT 18 16   ANIONGAP 9 6*   EGFRNONAA 54* >60     TSH:   Recent Labs   Lab 10/07/19  1418   TSH 1.758     Urine Culture: No results for input(s): LABURIN in the last 48 hours.  Urine Studies:   Recent Labs   Lab 10/07/19  1819   COLORU Yellow   APPEARANCEUA Clear   PHUR 6.0   SPECGRAV <=1.005*   PROTEINUA Negative   GLUCUA Negative   KETONESU Negative   BILIRUBINUA Negative   OCCULTUA 1+*   NITRITE Negative   UROBILINOGEN Negative   LEUKOCYTESUR 1+*   RBCUA 7*   WBCUA 50*   BACTERIA Rare       Significant Imaging: I have reviewed all pertinent imaging results/findings within the past 24 hours.   CXR  Left-sided pacer device is again seen.  The lungs are clear.  The heart is normal in size.  Median sternotomy wires are midline.  Skeletal structures are intact.    CTA  No acute abnormality. No high-grade stenosis or major vessel occlusion.    CT:  No acute intracranial findings.    Age-appropriate cerebral volume loss with mild moderate patchy decreased attenuation supratentorial white matter while nonspecific suggestive for chronic ischemic change.    No evidence for acute intracranial hemorrhage.  Clinical correlation and further evaluation as warranted.

## 2019-10-08 NOTE — HPI
James uQan Jr. is a 74 y.o. male who presented to the ER yesterday with dizziness, weakness, and complaint that his eyes were jumping.  Teleneurology consult was completed and felt basilar stem occlusion needed to be ruled out. The patient was kept for further observation, CTA, and echo.    Of note, he was in the ER a few days ago with UTI symptoms. Cardiology is consulted this admission given his dizziness, pacemaker history, and mild bradycardia in the ER       He reports he was pale and eyes were jumping and he could not walk with dizziness started. He felt movement made this work.  Symptoms resolved since ER admission    He had one hour of symptoms starting at 12:15 pm yesterday    Symptoms worsened with looking and turning left

## 2019-10-08 NOTE — ASSESSMENT & PLAN NOTE
Resume aspirin.  BB and losartan on hold tonight.  Ranexa to be given.  Monitor BPs.    Switch from lipitor to crestor.- due lt leg cramps; discussed trial crestor

## 2019-10-08 NOTE — CARE UPDATE
Patient resting comfortably on room air and maintaining sats, no distress noted. Will continue to monitor.

## 2019-10-08 NOTE — NURSING
Pt states he took home med of cefpodoxime already this am. Wife had brought home medication that is ordered. Wife states if pt is not discharged we can send med to pharmacy.

## 2019-10-08 NOTE — ASSESSMENT & PLAN NOTE
R/O basilar lesion.  Monitoring tonight.  Seems more like vertigo.  Dr. Higginbotham consulted.  Vessels to posterior circulation okay.  Can get MRI according to Dr. Toscano's nurse. Just need cardiology to evaluate pacer post procedure to make sure its still functioning normally.   Will get MRI to rule out posterior CVA   Dizziness completely resolved at this time.  If it recurs may consider antivert vs scopolamine patch  Per neurology eval this PM;   His mild non-sustain left lateral Nystagmus without any cranial nerve or other deficit on exam currently suggests peripherally mediated vertigo. He had  vertigo worse with movement yesterday which is resolved  -CTA head and neck reassuring  -He can't have an MRI at this facility (has an MRI compatible pacemaker), but further neuroimaging is not needed at this time unless new or worse symptoms as reviewed with the patient  -He is pending PPM  Interrogation per cardiology and evaluation of his mild bradycardia, but I feel his symptoms localize to the left vestibular nerve  -Continue ASA and statin for CVA prevention. Echo pending.   -Will need PT for vestibular rehab if this recurs. If new or worsening symptoms, an MRI can be done at another facility.   -Neurology clinic follow up needed in 2-3 weeks

## 2019-10-08 NOTE — SUBJECTIVE & OBJECTIVE
Past Medical History:   Diagnosis Date    Anemia     Aplasia bone marrow     Asthma     CA of prostate     CML (chronic myeloid leukemia)     Coronary artery disease     Heart attack 6/17/2014    Heart failure     Hyperlipidemia     Hypertension     Hypothyroidism     Kidney stones     Prostate cancer     Thyroid disease     Urinary incontinence        Past Surgical History:   Procedure Laterality Date    APPENDECTOMY  1966    ATRIAL CARDIAC PACEMAKER INSERTION  10/2018    CHOLECYSTECTOMY  1980    CORONARY ARTERY BYPASS GRAFT  1988    CORONARY STENT PLACEMENT  1990    CYSTOSCOPY      heart attack  6/17/2014    PROSTATE SURGERY      TURP    radiation      for ca prostate    TONSILLECTOMY      As child    TRANSURETHRAL RESECTION OF PROSTATE      x3    TRANSURETHRAL RESECTION OF PROSTATE      urethral dilation      VASECTOMY  1979       Review of patient's allergies indicates:   Allergen Reactions    Niacin preparations Rash    Bactrim [sulfamethoxazole-trimethoprim] Hives and Itching       No current facility-administered medications on file prior to encounter.      Current Outpatient Medications on File Prior to Encounter   Medication Sig    ascorbic acid, vitamin C, (VITAMIN C) 100 MG tablet Take 100 mg by mouth once daily.    aspirin (ECOTRIN) 81 MG EC tablet Take 81 mg by mouth once daily.    atorvastatin (LIPITOR) 40 MG tablet Take 40 mg by mouth once daily.     b complex vitamins tablet Take 1 tablet by mouth once daily.    beta carotene 90836 UNIT capsule Take 10,000 Units by mouth once daily.     catheter (BARD RUBBER UTILITY CATHETER) 14 Fr Misc 1 Units by Misc.(Non-Drug; Combo Route) route every 7 days. (Patient taking differently: 1 Units by Misc.(Non-Drug; Combo Route) route every other day. )    cephALEXin (KEFLEX) 500 MG capsule Take 1 capsule (500 mg total) by mouth every 8 (eight) hours. for 7 days    cyanocobalamin (VITAMIN B-12) 1000 MCG tablet Take 1,000 mcg by  mouth nightly.     ferrous sulfate 325 (65 FE) MG EC tablet Take 325 mg by mouth once daily.    fish oil-omega-3 fatty acids 300-1,000 mg capsule Take by mouth once daily.    folic acid (FOLVITE) 400 MCG tablet Take 400 mcg by mouth once daily.    GLEEVEC 400 mg Tab 400 mg once daily.     isosorbide mononitrate (IMDUR) 30 MG 24 hr tablet Take 30 mg by mouth once daily.    levothyroxine (SYNTHROID) 100 MCG tablet TAKE 1 TABLET EVERY DAY    losartan (COZAAR) 100 MG tablet Take 1 tablet (100 mg total) by mouth once daily.    lycopene 10 mg Cap Take 1 capsule by mouth nightly.     NITROSTAT 0.4 mg SL tablet Place 0.4 mg under the tongue every 5 (five) minutes as needed.     PRASTERONE, DHEA, (DHEA ORAL) Take 25 mg by mouth once daily. 50 mg. tablet     RANEXA 1,000 mg Tb12 Take 500 mg by mouth 2 (two) times daily.     vitamin D (VITAMIN D3) 1000 units Tab Take 2,000 Units by mouth once daily.     Family History     Problem Relation (Age of Onset)    Diabetes Father        Tobacco Use    Smoking status: Former Smoker     Packs/day: 1.00     Years: 25.00     Pack years: 25.00     Types: Cigarettes     Last attempt to quit: 1988     Years since quittin.4    Smokeless tobacco: Never Used   Substance and Sexual Activity    Alcohol use: No    Drug use: No    Sexual activity: Yes     Partners: Female     Review of Systems   Constitution: Positive for malaise/fatigue. Negative for chills, decreased appetite, diaphoresis, fever, night sweats, weight gain and weight loss.   HENT: Negative.    Eyes: Negative.    Cardiovascular: Negative.    Respiratory: Negative.    Endocrine: Negative.    Hematologic/Lymphatic: Negative.    Skin: Negative.    Musculoskeletal: Positive for muscle weakness. Negative for arthritis, back pain, falls, gout, joint pain, joint swelling, muscle cramps, myalgias, neck pain and stiffness.   Gastrointestinal: Negative.    Genitourinary: Negative.    Neurological: Positive for  dizziness and weakness. Negative for aphonia, brief paralysis, difficulty with concentration, disturbances in coordination, excessive daytime sleepiness, focal weakness, headaches, light-headedness, loss of balance, numbness, paresthesias, seizures, sensory change, tremors and vertigo.   Psychiatric/Behavioral: Negative.    Allergic/Immunologic: Negative.      Objective:     Vital Signs (Most Recent):  Temp: 98 °F (36.7 °C) (10/08/19 0713)  Pulse: 60 (10/08/19 0713)  Resp: 16 (10/08/19 0713)  BP: (!) 156/73 (10/08/19 0713)  SpO2: 98 % (10/08/19 0735) Vital Signs (24h Range):  Temp:  [96.3 °F (35.7 °C)-98 °F (36.7 °C)] 98 °F (36.7 °C)  Pulse:  [51-72] 60  Resp:  [14-20] 16  SpO2:  [97 %-100 %] 98 %  BP: (129-166)/(60-73) 156/73     Weight: 73.5 kg (162 lb 0.6 oz)  Body mass index is 26.15 kg/m².    SpO2: 98 %  O2 Device (Oxygen Therapy): room air    No intake or output data in the 24 hours ending 10/08/19 0839    Lines/Drains/Airways     Peripheral Intravenous Line                 Peripheral IV - Single Lumen 10/07/19 1415 20 G Right Antecubital less than 1 day                Physical Exam   Constitutional: He is oriented to person, place, and time. He appears well-developed and well-nourished. No distress.   HENT:   Head: Normocephalic and atraumatic.   Neck: Normal range of motion. Neck supple. No JVD present. No tracheal deviation present. No thyromegaly present.   Cardiovascular: Regular rhythm and intact distal pulses. Exam reveals gallop. Exam reveals no friction rub.   Murmur (2/6 ALDO) heard.  Pulmonary/Chest: Effort normal and breath sounds normal. No stridor. No respiratory distress. He has no wheezes. He has no rales. He exhibits no tenderness.   Abdominal: Soft. Bowel sounds are normal. He exhibits no distension and no mass. There is no tenderness. There is no rebound and no guarding.   Musculoskeletal: Normal range of motion. He exhibits no edema, tenderness or deformity.   Neurological: He is alert and  oriented to person, place, and time.   Skin: Skin is warm and dry. No rash noted. He is not diaphoretic. No erythema. No pallor.   Psychiatric: He has a normal mood and affect. His behavior is normal. Judgment and thought content normal.       Significant Labs:   ABG: No results for input(s): PH, PCO2, HCO3, POCSATURATED, BE in the last 48 hours., Blood Culture:   Recent Labs   Lab 10/07/19  1419   LABBLOO No Growth to date  No Growth to date   , BMP:   Recent Labs   Lab 10/07/19  1418 10/08/19  0545   * 101    142   K 4.0 4.1    107   CO2 25 29   BUN 17 14   CREATININE 1.3 1.1   CALCIUM 9.0 9.1   MG 1.7  --    , CMP   Recent Labs   Lab 10/07/19  1418 10/08/19  0545    142   K 4.0 4.1    107   CO2 25 29   * 101   BUN 17 14   CREATININE 1.3 1.1   CALCIUM 9.0 9.1   PROT 6.8 6.3   ALBUMIN 3.4* 3.0*   BILITOT 0.5 0.4   ALKPHOS 70 75   AST 20 17   ALT 18 16   ANIONGAP 9 6*   ESTGFRAFRICA >60 >60   EGFRNONAA 54* >60   , CBC   Recent Labs   Lab 10/07/19  1418 10/08/19  0545   WBC 11.99 7.79   HGB 11.1* 10.8*   HCT 33.5* 32.8*    315   , INR No results for input(s): INR, PROTIME in the last 48 hours., Lipid Panel No results for input(s): CHOL, HDL, LDLCALC, TRIG, CHOLHDL in the last 48 hours.,   Pathology Results  (Last 10 years)    None      , Troponin   Recent Labs   Lab 10/07/19  1418   TROPONINI <0.006   , All pertinent lab results from the last 24 hours have been reviewed. and   Recent Lab Results       10/08/19  0545   10/07/19  2134   10/07/19  1819   10/07/19  1743   10/07/19  1419        Albumin 3.0             Alkaline Phosphatase 75             ALT 16             Anion Gap 6             Appearance, UA     Clear         AST 17             Bacteria, UA     Rare         Baso # 0.03             Basophil% 0.4             Bilirubin (UA)     Negative         BILIRUBIN TOTAL 0.4  Comment:  For infants and newborns, interpretation of results should be based  on gestational  age, weight and in agreement with clinical  observations.  Premature Infant recommended reference ranges:  Up to 24 hours.............<8.0 mg/dL  Up to 48 hours............<12.0 mg/dL  3-5 days..................<15.0 mg/dL  6-29 days.................<15.0 mg/dL               Blood Culture, Routine         No Growth to date[P]              No Growth to date[P]     BNP               BUN, Bld 14             Calcium 9.1             Chloride 107             CO2 29             Color, UA     Yellow         CPK               CPK MB               Creatinine 1.1             Differential Method Automated             eGFR if  >60             eGFR if non  >60  Comment:  Calculation used to obtain the estimated glomerular filtration  rate (eGFR) is the CKD-EPI equation.                Eos # 0.1             Eosinophil% 1.4             Glucose 101             Glucose, UA     Negative         Gran # (ANC) 5.2             Gran% 66.1             Hematocrit 32.8             Hemoglobin 10.8             Immature Grans (Abs) 0.03  Comment:  Mild elevation in immature granulocytes is non specific and   can be seen in a variety of conditions including stress response,   acute inflammation, trauma and pregnancy. Correlation with other   laboratory and clinical findings is essential.               Immature Granulocytes 0.4             Ketones, UA     Negative         Lactate, Giles   1.0  Comment:  Falsely low lactic acid results can be found in samples   containing >=13.0 mg/dL total bilirubin and/or >=3.5 mg/dL   direct bilirubin.     0.7  Comment:  Falsely low lactic acid results can be found in samples   containing >=13.0 mg/dL total bilirubin and/or >=3.5 mg/dL   direct bilirubin.         Leukocytes, UA     1+         Lipase               Lymph # 1.9             Lymph% 24.1             Magnesium               MB%               MCH 33.2             MCHC 32.9                          Microscopic Comment      SEE COMMENT  Comment:  Other formed elements not mentioned in the report are not   present in the microscopic examination.            Mono # 0.6             Mono% 7.6             MPV 9.5             NITRITE UA     Negative         nRBC 0             Occult Blood UA     1+         pH, UA     6.0         Phosphorus               Platelets 315             POCT Glucose               Potassium 4.1             PROTEIN TOTAL 6.3             Protein, UA     Negative  Comment:  Recommend a 24 hour urine protein or a urine   protein/creatinine ratio if globulin induced proteinuria is  clinically suspected.           RBC 3.25             RBC, UA     7         RDW 14.8             Sodium 142             Specific Gravity, UA     <=1.005         Specimen UA     Urine, Clean Catch         Troponin I               TSH               UROBILINOGEN UA     Negative         WBC, UA     50         WBC 7.79                              10/07/19  1418   10/07/19  1340        Albumin 3.4       Alkaline Phosphatase 70       ALT 18       Anion Gap 9       Appearance, UA         AST 20       Bacteria, UA         Baso # 0.05       Basophil% 0.4       Bilirubin (UA)         BILIRUBIN TOTAL 0.5  Comment:  For infants and newborns, interpretation of results should be based  on gestational age, weight and in agreement with clinical  observations.  Premature Infant recommended reference ranges:  Up to 24 hours.............<8.0 mg/dL  Up to 48 hours............<12.0 mg/dL  3-5 days..................<15.0 mg/dL  6-29 days.................<15.0 mg/dL         Blood Culture, Routine         BNP 85  Comment:  Values of less than 100 pg/ml are consistent with non-CHF populations.       BUN, Bld 17       Calcium 9.0       Chloride 106       CO2 25       Color, UA         CPK 80        80       CPK MB 2.2       Creatinine 1.3       Differential Method Automated       eGFR if  >60       eGFR if non  54  Comment:  Calculation  "used to obtain the estimated glomerular filtration  rate (eGFR) is the CKD-EPI equation.          Eos # 0.1       Eosinophil% 1.1       Glucose 138       Glucose, UA         Gran # (ANC) 9.2       Gran% 76.3       Hematocrit 33.5       Hemoglobin 11.1       Immature Grans (Abs) 0.07  Comment:  Mild elevation in immature granulocytes is non specific and   can be seen in a variety of conditions including stress response,   acute inflammation, trauma and pregnancy. Correlation with other   laboratory and clinical findings is essential.         Immature Granulocytes 0.6       Ketones, UA         Lactate, Giles 1.2  Comment:  Falsely low lactic acid results can be found in samples   containing >=13.0 mg/dL total bilirubin and/or >=3.5 mg/dL   direct bilirubin.         Leukocytes, UA         Lipase 56       Lymph # 1.7       Lymph% 14.3       Magnesium 1.7       MB% 2.8  Comment:  To be positive, the MB% must be greater than 5% AND the CK-MB  greater than 6.5 ng/mL. Values not in the reference interval,   but not qualifying as positive, should be considered "trace".         MCH 32.8       MCHC 33.1       MCV 99       Microscopic Comment         Mono # 0.9       Mono% 7.3       MPV 9.6       NITRITE UA         nRBC 0       Occult Blood UA         pH, UA         Phosphorus 2.9       Platelets 335       POCT Glucose   138     Potassium 4.0       PROTEIN TOTAL 6.8       Protein, UA         RBC 3.38       RBC, UA         RDW 14.7       Sodium 140       Specific Gravity, UA         Specimen UA         Troponin I <0.006  Comment:  The reference interval for Troponin I represents the 99th percentile   cutoff   for our facility and is consistent with 3rd generation assay   performance.         TSH 1.758       UROBILINOGEN UA         WBC, UA         WBC 11.99             Significant Imaging: CT scan: CT ABDOMEN PELVIS WITH CONTRAST: No results found for this visit on 10/07/19. and CT ABDOMEN PELVIS WITHOUT CONTRAST: No results found " for this visit on 10/07/19., Echocardiogram: 2D echo with color flow doppler: No results found for this or any previous visit. and Transthoracic echo (TTE) complete (Cupid Only): No results found for this or any previous visit. and X-Ray: CXR: X-Ray Chest 1 View (CXR):   Results for orders placed or performed during the hospital encounter of 10/07/19   X-Ray Chest 1 View    Narrative    EXAMINATION:  XR CHEST 1 VIEW    CLINICAL HISTORY:  CHEST PAIN;    TECHNIQUE:  Single frontal view of the chest was performed.    COMPARISON:  05/19/2019    FINDINGS:  Left-sided pacer device is again seen.  The lungs are clear.  The heart is normal in size.  Median sternotomy wires are midline.  Skeletal structures are intact.      Impression    No acute process.      Electronically signed by: Abbi Du MD  Date:    10/07/2019  Time:    14:44    and X-Ray Chest PA and Lateral (CXR): No results found for this visit on 10/07/19. and KUB: X-Ray Abdomen AP 1 View (KUB): No results found for this visit on 10/07/19.

## 2019-10-08 NOTE — ASSESSMENT & PLAN NOTE
Consult cardiology for PPM interrogation  D/c home after this if ok with cards  Echo also pending

## 2019-10-08 NOTE — ASSESSMENT & PLAN NOTE
Resume aspirin.  BB and losartan on hold tonight.  Ranexa to be given.  Monitor BPs.    Switch from lipitor to crestor.

## 2019-10-08 NOTE — ASSESSMENT & PLAN NOTE
Rule out CVA  MRI pending- if normal will D/C home with tx for Vertigo   Unable to obtain MRI at this facility but cleared by neurology for D/C   His mild non-sustain left lateral Nystagmus without any cranial nerve or other deficit on exam currently suggests peripherally mediated vertigo. He had  vertigo worse with movement yesterday which is resolved  -CTA head and neck reassuring  -He can't have an MRI at this facility (has an MRI compatible pacemaker), but further neuroimaging is not needed at this time unless new or worse symptoms as reviewed with the patient  -He is pending PPM  Interrogation per cardiology and evaluation of his mild bradycardia, but I feel his symptoms localize to the left vestibular nerve  -Continue ASA and statin for CVA prevention. Echo pending.   -Will need PT for vestibular rehab if this recurs. If new or worsening symptoms, an MRI can be done at another facility.   -Neurology clinic follow up needed in 2-3 weeks

## 2019-10-09 NOTE — PLAN OF CARE
10/09/19 0719   Post-Acute Status   Post-Acute Authorization Other   Other Status No Post-Acute Service Needs

## 2019-10-09 NOTE — PLAN OF CARE
10/09/19 0719   Final Note   Assessment Type Final Discharge Note   Anticipated Discharge Disposition Home   What phone number can be called within the next 1-3 days to see how you are doing after discharge? [phone]       No post-acute care needs identified during this hospital stay.     Carolyn Jeronimo LMSW

## 2019-10-11 ENCOUNTER — OFFICE VISIT (OUTPATIENT)
Dept: UROLOGY | Facility: CLINIC | Age: 74
End: 2019-10-11
Payer: MEDICARE

## 2019-10-11 VITALS
SYSTOLIC BLOOD PRESSURE: 118 MMHG | HEART RATE: 69 BPM | HEIGHT: 66 IN | DIASTOLIC BLOOD PRESSURE: 72 MMHG | BODY MASS INDEX: 26.22 KG/M2 | WEIGHT: 163.13 LBS

## 2019-10-11 DIAGNOSIS — N41.0 ACUTE PROSTATITIS: Primary | ICD-10-CM

## 2019-10-11 DIAGNOSIS — N40.1 BPH WITH URINARY OBSTRUCTION: ICD-10-CM

## 2019-10-11 DIAGNOSIS — N99.114 POSTPROCEDURAL MALE URETHRAL STRICTURE: ICD-10-CM

## 2019-10-11 DIAGNOSIS — N13.8 BPH WITH URINARY OBSTRUCTION: ICD-10-CM

## 2019-10-11 LAB
BACTERIA UR CULT: ABNORMAL
BACTERIA UR CULT: ABNORMAL

## 2019-10-11 PROCEDURE — 99499 RISK ADDL DX/OHS AUDIT: ICD-10-PCS | Mod: HCNC,S$GLB,, | Performed by: UROLOGY

## 2019-10-11 PROCEDURE — 3078F PR MOST RECENT DIASTOLIC BLOOD PRESSURE < 80 MM HG: ICD-10-PCS | Mod: HCNC,CPTII,S$GLB, | Performed by: UROLOGY

## 2019-10-11 PROCEDURE — 3074F SYST BP LT 130 MM HG: CPT | Mod: HCNC,CPTII,S$GLB, | Performed by: UROLOGY

## 2019-10-11 PROCEDURE — 99499 UNLISTED E&M SERVICE: CPT | Mod: HCNC,S$GLB,, | Performed by: UROLOGY

## 2019-10-11 PROCEDURE — 1101F PR PT FALLS ASSESS DOC 0-1 FALLS W/OUT INJ PAST YR: ICD-10-PCS | Mod: HCNC,CPTII,S$GLB, | Performed by: UROLOGY

## 2019-10-11 PROCEDURE — 3078F DIAST BP <80 MM HG: CPT | Mod: HCNC,CPTII,S$GLB, | Performed by: UROLOGY

## 2019-10-11 PROCEDURE — 99999 PR PBB SHADOW E&M-EST. PATIENT-LVL III: CPT | Mod: PBBFAC,HCNC,, | Performed by: UROLOGY

## 2019-10-11 PROCEDURE — 3074F PR MOST RECENT SYSTOLIC BLOOD PRESSURE < 130 MM HG: ICD-10-PCS | Mod: HCNC,CPTII,S$GLB, | Performed by: UROLOGY

## 2019-10-11 PROCEDURE — 99215 OFFICE O/P EST HI 40 MIN: CPT | Mod: HCNC,S$GLB,, | Performed by: UROLOGY

## 2019-10-11 PROCEDURE — 1101F PT FALLS ASSESS-DOCD LE1/YR: CPT | Mod: HCNC,CPTII,S$GLB, | Performed by: UROLOGY

## 2019-10-11 PROCEDURE — 99999 PR PBB SHADOW E&M-EST. PATIENT-LVL III: ICD-10-PCS | Mod: PBBFAC,HCNC,, | Performed by: UROLOGY

## 2019-10-11 PROCEDURE — 99215 PR OFFICE/OUTPT VISIT, EST, LEVL V, 40-54 MIN: ICD-10-PCS | Mod: HCNC,S$GLB,, | Performed by: UROLOGY

## 2019-10-11 RX ORDER — LIDOCAINE HYDROCHLORIDE 20 MG/ML
JELLY TOPICAL ONCE
Status: CANCELLED | OUTPATIENT
Start: 2019-10-11 | End: 2019-10-11

## 2019-10-11 RX ORDER — DOXYCYCLINE HYCLATE 100 MG
100 TABLET ORAL ONCE
Status: CANCELLED | OUTPATIENT
Start: 2019-10-11 | End: 2019-10-11

## 2019-10-11 RX ORDER — CEFPODOXIME PROXETIL 200 MG/1
200 TABLET, FILM COATED ORAL 2 TIMES DAILY
Qty: 30 TABLET | Refills: 1 | Status: SHIPPED | OUTPATIENT
Start: 2019-10-11 | End: 2019-12-10

## 2019-10-11 NOTE — H&P (VIEW-ONLY)
CC: suprapubic area    James Quan Jr. is a 74 y.o. man who is here for the evaluation of Bladder Pain (ER follow up wiht UTI. on vantin-has 2-3 days left) and Urethral Stricture (hx of stricture, does sic weekly to keep patent , had radiation for prostate years ago)  a diagnosis of adenocarcinoma the prostate having been made in 2002.  The patient underwent external beam radiation therapy.the patient is also status post TURP.  The patient is incontinent but does urinate on his own periodically.  The patient has documented urethral stricture disease and catheterizes himself once a week.  He states that he meets minimal resistance.    A new pt referred by his PCP, Alex Mcallister MD   Pt has seen me in the past (2014) pt has hx prostate cancer, stricture and xrt. Pt also has  Incontinence. Dr zhang would like pt to see dr gibbs tomorrow if possible.  Pt is complaining of pain involving the lower abdomen and groin area.  Has been on Vantin for 1 wk and took Azo for dysuria.  Overall his pain is better.    Denies flank pain, hematuria.      Past Medical History:   Diagnosis Date    Anemia     Aplasia bone marrow     Asthma     CA of prostate     CML (chronic myeloid leukemia)     Coronary artery disease     Heart attack 6/17/2014    Heart failure     Hyperlipidemia     Hypertension     Hypothyroidism     Kidney stones     Prostate cancer     Thyroid disease     Urinary incontinence      Past Surgical History:   Procedure Laterality Date    APPENDECTOMY  1966    ATRIAL CARDIAC PACEMAKER INSERTION  10/2018    CHOLECYSTECTOMY  1980    CORONARY ARTERY BYPASS GRAFT  1988    CORONARY STENT PLACEMENT  1990    CYSTOSCOPY      heart attack  6/17/2014    PROSTATE SURGERY      TURP    radiation      for ca prostate    TONSILLECTOMY      As child    TRANSURETHRAL RESECTION OF PROSTATE      x3    TRANSURETHRAL RESECTION OF PROSTATE      urethral dilation      VASECTOMY  1979     Social  History     Tobacco Use    Smoking status: Former Smoker     Packs/day: 1.00     Years: 25.00     Pack years: 25.00     Types: Cigarettes     Last attempt to quit: 1988     Years since quittin.4    Smokeless tobacco: Never Used   Substance Use Topics    Alcohol use: No    Drug use: No     Family History   Problem Relation Age of Onset    Diabetes Father      Allergy:  Review of patient's allergies indicates:   Allergen Reactions    Niacin preparations Rash    Bactrim [sulfamethoxazole-trimethoprim] Hives and Itching     Outpatient Encounter Medications as of 10/11/2019   Medication Sig Dispense Refill    ascorbic acid, vitamin C, (VITAMIN C) 100 MG tablet Take 100 mg by mouth once daily.      aspirin (ECOTRIN) 81 MG EC tablet Take 81 mg by mouth once daily.      b complex vitamins tablet Take 1 tablet by mouth once daily.      beta carotene 44783 UNIT capsule Take 10,000 Units by mouth once daily.       catheter (BARD RUBBER UTILITY CATHETER) 14 Fr Misc 1 Units by Misc.(Non-Drug; Combo Route) route every 7 days. (Patient taking differently: 1 Units by Misc.(Non-Drug; Combo Route) route every other day. ) 30 each 3    cefpodoxime (VANTIN) 200 MG tablet Take 1 tablet (200 mg total) by mouth 2 (two) times daily. 30 tablet 1    cyanocobalamin (VITAMIN B-12) 1000 MCG tablet Take 1,000 mcg by mouth nightly.       ferrous sulfate 325 (65 FE) MG EC tablet Take 325 mg by mouth once daily.      fish oil-omega-3 fatty acids 300-1,000 mg capsule Take by mouth once daily.      folic acid (FOLVITE) 400 MCG tablet Take 400 mcg by mouth once daily.      GLEEVEC 400 mg Tab 400 mg once daily.       isosorbide mononitrate (IMDUR) 30 MG 24 hr tablet Take 30 mg by mouth once daily.      levothyroxine (SYNTHROID) 100 MCG tablet TAKE 1 TABLET EVERY DAY 90 tablet 3    losartan (COZAAR) 100 MG tablet Take 1 tablet (100 mg total) by mouth once daily. 30 tablet 0    lycopene 10 mg Cap Take 1 capsule by mouth  nightly.       meclizine (ANTIVERT) 25 mg tablet Take 1 tablet (25 mg total) by mouth 3 (three) times daily as needed. 30 tablet 0    NITROSTAT 0.4 mg SL tablet Place 0.4 mg under the tongue every 5 (five) minutes as needed.       PRASTERONE, DHEA, (DHEA ORAL) Take 25 mg by mouth once daily. 50 mg. tablet       RANEXA 1,000 mg Tb12 Take 500 mg by mouth 2 (two) times daily.       rosuvastatin (CRESTOR) 10 MG tablet Take 1 tablet (10 mg total) by mouth every evening. 30 tablet 0    vitamin D (VITAMIN D3) 1000 units Tab Take 2,000 Units by mouth once daily.      [DISCONTINUED] atorvastatin (LIPITOR) 40 MG tablet Take 40 mg by mouth once daily.       [DISCONTINUED] cefpodoxime (VANTIN) 200 MG tablet Take 200 mg by mouth 2 (two) times daily.      [DISCONTINUED] cephALEXin (KEFLEX) 500 MG capsule Take 1 capsule (500 mg total) by mouth every 8 (eight) hours. for 7 days 21 capsule 0    [DISCONTINUED] acetaminophen tablet 650 mg       [DISCONTINUED] aspirin EC tablet 81 mg       [DISCONTINUED] cefpodoxime tablet 200 mg       [DISCONTINUED] HYDROcodone-acetaminophen 5-325 mg per tablet 1 tablet       [DISCONTINUED] isosorbide mononitrate 24 hr tablet 30 mg       [DISCONTINUED] levothyroxine tablet 100 mcg       [DISCONTINUED] ondansetron injection 4 mg       [DISCONTINUED] pantoprazole EC tablet 40 mg       [DISCONTINUED] promethazine (PHENERGAN) 12.5 mg in dextrose 5 % 50 mL IVPB       [DISCONTINUED] ranolazine 12 hr tablet 500 mg       [DISCONTINUED] rosuvastatin tablet 10 mg       [DISCONTINUED] sodium chloride 0.9% flush 10 mL        No facility-administered encounter medications on file as of 10/11/2019.      Review of Systems   ROS  Physical Exam     Vitals:    10/11/19 0939   BP: 118/72   Pulse: 69     Physical Exam   Constitutional: He is oriented to person, place, and time. He appears well-developed and well-nourished. No distress.   HENT:   Head: Normocephalic and atraumatic.   Right Ear:  External ear normal.   Left Ear: External ear normal.   Nose: Nose normal.   Mouth/Throat: Oropharynx is clear and moist.   Eyes: Conjunctivae are normal. Pupils are equal, round, and reactive to light.   Neck: Normal range of motion. Neck supple. No JVD present. No tracheal deviation present. No thyromegaly present.   Cardiovascular: Normal rate, regular rhythm, normal heart sounds and intact distal pulses.  Exam reveals no gallop and no friction rub.    No murmur heard.  Pulmonary/Chest: Effort normal and breath sounds normal. No respiratory distress. He has no wheezes. He exhibits no tenderness.   Abdominal: Soft. Bowel sounds are normal. He exhibits no distension and no mass. There is no tenderness. There is no rebound and no guarding.   Genitourinary: Rectum normal and penis normal. No penile tenderness.   Genitourinary Comments: Prostate 40 grams with negative nodule or positive tenderness  Minimal EPS.  Under microscopic exam: no significant WBCs noted but suspicious GNR?   Musculoskeletal: Normal range of motion. He exhibits no edema, tenderness or deformity.   Lymphadenopathy:     He has no cervical adenopathy.   Neurological: He is alert and oriented to person, place, and time.   Skin: Skin is warm and dry. He is not diaphoretic.     Psychiatric: He has a normal mood and affect. His behavior is normal. Thought content normal.     Genitalia:  Scrotum: no rash or lesion  Normal symmetric epididymis without masses  Normal vas palpated  Normal size, symmetric testicles with no masses   Normal urethral meatus with no discharge  Normal circumcised penis with no lesion   Rectal:  Normal perineum and anus upon inspection.  Normal tone, no masses or tenderness;     LABS:  Lab Results   Component Value Date    PSA 0.37 04/23/2014    PSADIAG 0.82 11/07/2016    PSADIAG 0.50 06/19/2015     Results for orders placed or performed in visit on 11/07/16   Prostate Specific Antigen, Diagnostic   Result Value Ref Range    PSA  DIAGNOSTIC 0.82 0.00 - 4.00 ng/mL   Results for orders placed or performed in visit on 06/19/15   Prostate Specific Antigen, Diagnostic   Result Value Ref Range    PSA DIAGNOSTIC 0.50 0.00 - 4.00 ng/mL     Lab Results   Component Value Date    CREATININE 1.1 10/08/2019    CREATININE 1.3 10/07/2019    CREATININE 1.1 10/01/2019     No results found for this or any previous visit.  Urine Culture, Routine   Date Value Ref Range Status   10/07/2019 PSEUDOMONAS AERUGINOSA  10,000 - 49,999 cfu/ml   (A)  Final   10/07/2019 ENTEROCOCCUS FAECALIS  10,000 - 49,999 cfu/ml   (A)  Final     No results found for: HGBA1C    Assessment and Plan:  James was seen today for bladder pain and urethral stricture.    Diagnoses and all orders for this visit:    Acute prostatitis  -     cefpodoxime (VANTIN) 200 MG tablet; Take 1 tablet (200 mg total) by mouth 2 (two) times daily.  -     Cystoscopy; Future    BPH with urinary obstruction  -     Prostate Specific Antigen, Diagnostic; Future    Postprocedural male urethral stricture    Other orders  -     lidocaine HCl 2% urojet  -     doxycycline tablet 100 mg    recently grew pseudomonas and enterococcus.  Responded well to Vantin.  Will continue Vantin 15 more days.  After abx course is completed, will check PSA and plan cysto.    Follow-up:  Follow up in about 4 weeks (around 11/8/2019), or cysto, for PSA.

## 2019-10-11 NOTE — PATIENT INSTRUCTIONS
Lab Results   Component Value Date    PSA 0.37 04/23/2014    PSADIAG 0.82 11/07/2016    PSADIAG 0.50 06/19/2015

## 2019-10-11 NOTE — PROGRESS NOTES
CC: suprapubic area    James Quan Jr. is a 74 y.o. man who is here for the evaluation of Bladder Pain (ER follow up wiht UTI. on vantin-has 2-3 days left) and Urethral Stricture (hx of stricture, does sic weekly to keep patent , had radiation for prostate years ago)  a diagnosis of adenocarcinoma the prostate having been made in 2002.  The patient underwent external beam radiation therapy.the patient is also status post TURP.  The patient is incontinent but does urinate on his own periodically.  The patient has documented urethral stricture disease and catheterizes himself once a week.  He states that he meets minimal resistance.    A new pt referred by his PCP, Alex Mcallister MD   Pt has seen me in the past (2014) pt has hx prostate cancer, stricture and xrt. Pt also has  Incontinence. Dr zhang would like pt to see dr gibbs tomorrow if possible.  Pt is complaining of pain involving the lower abdomen and groin area.  Has been on Vantin for 1 wk and took Azo for dysuria.  Overall his pain is better.    Denies flank pain, hematuria.      Past Medical History:   Diagnosis Date    Anemia     Aplasia bone marrow     Asthma     CA of prostate     CML (chronic myeloid leukemia)     Coronary artery disease     Heart attack 6/17/2014    Heart failure     Hyperlipidemia     Hypertension     Hypothyroidism     Kidney stones     Prostate cancer     Thyroid disease     Urinary incontinence      Past Surgical History:   Procedure Laterality Date    APPENDECTOMY  1966    ATRIAL CARDIAC PACEMAKER INSERTION  10/2018    CHOLECYSTECTOMY  1980    CORONARY ARTERY BYPASS GRAFT  1988    CORONARY STENT PLACEMENT  1990    CYSTOSCOPY      heart attack  6/17/2014    PROSTATE SURGERY      TURP    radiation      for ca prostate    TONSILLECTOMY      As child    TRANSURETHRAL RESECTION OF PROSTATE      x3    TRANSURETHRAL RESECTION OF PROSTATE      urethral dilation      VASECTOMY  1979     Social  History     Tobacco Use    Smoking status: Former Smoker     Packs/day: 1.00     Years: 25.00     Pack years: 25.00     Types: Cigarettes     Last attempt to quit: 1988     Years since quittin.4    Smokeless tobacco: Never Used   Substance Use Topics    Alcohol use: No    Drug use: No     Family History   Problem Relation Age of Onset    Diabetes Father      Allergy:  Review of patient's allergies indicates:   Allergen Reactions    Niacin preparations Rash    Bactrim [sulfamethoxazole-trimethoprim] Hives and Itching     Outpatient Encounter Medications as of 10/11/2019   Medication Sig Dispense Refill    ascorbic acid, vitamin C, (VITAMIN C) 100 MG tablet Take 100 mg by mouth once daily.      aspirin (ECOTRIN) 81 MG EC tablet Take 81 mg by mouth once daily.      b complex vitamins tablet Take 1 tablet by mouth once daily.      beta carotene 23561 UNIT capsule Take 10,000 Units by mouth once daily.       catheter (BARD RUBBER UTILITY CATHETER) 14 Fr Misc 1 Units by Misc.(Non-Drug; Combo Route) route every 7 days. (Patient taking differently: 1 Units by Misc.(Non-Drug; Combo Route) route every other day. ) 30 each 3    cefpodoxime (VANTIN) 200 MG tablet Take 1 tablet (200 mg total) by mouth 2 (two) times daily. 30 tablet 1    cyanocobalamin (VITAMIN B-12) 1000 MCG tablet Take 1,000 mcg by mouth nightly.       ferrous sulfate 325 (65 FE) MG EC tablet Take 325 mg by mouth once daily.      fish oil-omega-3 fatty acids 300-1,000 mg capsule Take by mouth once daily.      folic acid (FOLVITE) 400 MCG tablet Take 400 mcg by mouth once daily.      GLEEVEC 400 mg Tab 400 mg once daily.       isosorbide mononitrate (IMDUR) 30 MG 24 hr tablet Take 30 mg by mouth once daily.      levothyroxine (SYNTHROID) 100 MCG tablet TAKE 1 TABLET EVERY DAY 90 tablet 3    losartan (COZAAR) 100 MG tablet Take 1 tablet (100 mg total) by mouth once daily. 30 tablet 0    lycopene 10 mg Cap Take 1 capsule by mouth  nightly.       meclizine (ANTIVERT) 25 mg tablet Take 1 tablet (25 mg total) by mouth 3 (three) times daily as needed. 30 tablet 0    NITROSTAT 0.4 mg SL tablet Place 0.4 mg under the tongue every 5 (five) minutes as needed.       PRASTERONE, DHEA, (DHEA ORAL) Take 25 mg by mouth once daily. 50 mg. tablet       RANEXA 1,000 mg Tb12 Take 500 mg by mouth 2 (two) times daily.       rosuvastatin (CRESTOR) 10 MG tablet Take 1 tablet (10 mg total) by mouth every evening. 30 tablet 0    vitamin D (VITAMIN D3) 1000 units Tab Take 2,000 Units by mouth once daily.      [DISCONTINUED] atorvastatin (LIPITOR) 40 MG tablet Take 40 mg by mouth once daily.       [DISCONTINUED] cefpodoxime (VANTIN) 200 MG tablet Take 200 mg by mouth 2 (two) times daily.      [DISCONTINUED] cephALEXin (KEFLEX) 500 MG capsule Take 1 capsule (500 mg total) by mouth every 8 (eight) hours. for 7 days 21 capsule 0    [DISCONTINUED] acetaminophen tablet 650 mg       [DISCONTINUED] aspirin EC tablet 81 mg       [DISCONTINUED] cefpodoxime tablet 200 mg       [DISCONTINUED] HYDROcodone-acetaminophen 5-325 mg per tablet 1 tablet       [DISCONTINUED] isosorbide mononitrate 24 hr tablet 30 mg       [DISCONTINUED] levothyroxine tablet 100 mcg       [DISCONTINUED] ondansetron injection 4 mg       [DISCONTINUED] pantoprazole EC tablet 40 mg       [DISCONTINUED] promethazine (PHENERGAN) 12.5 mg in dextrose 5 % 50 mL IVPB       [DISCONTINUED] ranolazine 12 hr tablet 500 mg       [DISCONTINUED] rosuvastatin tablet 10 mg       [DISCONTINUED] sodium chloride 0.9% flush 10 mL        No facility-administered encounter medications on file as of 10/11/2019.      Review of Systems   ROS  Physical Exam     Vitals:    10/11/19 0939   BP: 118/72   Pulse: 69     Physical Exam   Constitutional: He is oriented to person, place, and time. He appears well-developed and well-nourished. No distress.   HENT:   Head: Normocephalic and atraumatic.   Right Ear:  External ear normal.   Left Ear: External ear normal.   Nose: Nose normal.   Mouth/Throat: Oropharynx is clear and moist.   Eyes: Conjunctivae are normal. Pupils are equal, round, and reactive to light.   Neck: Normal range of motion. Neck supple. No JVD present. No tracheal deviation present. No thyromegaly present.   Cardiovascular: Normal rate, regular rhythm, normal heart sounds and intact distal pulses.  Exam reveals no gallop and no friction rub.    No murmur heard.  Pulmonary/Chest: Effort normal and breath sounds normal. No respiratory distress. He has no wheezes. He exhibits no tenderness.   Abdominal: Soft. Bowel sounds are normal. He exhibits no distension and no mass. There is no tenderness. There is no rebound and no guarding.   Genitourinary: Rectum normal and penis normal. No penile tenderness.   Genitourinary Comments: Prostate 40 grams with negative nodule or positive tenderness  Minimal EPS.  Under microscopic exam: no significant WBCs noted but suspicious GNR?   Musculoskeletal: Normal range of motion. He exhibits no edema, tenderness or deformity.   Lymphadenopathy:     He has no cervical adenopathy.   Neurological: He is alert and oriented to person, place, and time.   Skin: Skin is warm and dry. He is not diaphoretic.     Psychiatric: He has a normal mood and affect. His behavior is normal. Thought content normal.     Genitalia:  Scrotum: no rash or lesion  Normal symmetric epididymis without masses  Normal vas palpated  Normal size, symmetric testicles with no masses   Normal urethral meatus with no discharge  Normal circumcised penis with no lesion   Rectal:  Normal perineum and anus upon inspection.  Normal tone, no masses or tenderness;     LABS:  Lab Results   Component Value Date    PSA 0.37 04/23/2014    PSADIAG 0.82 11/07/2016    PSADIAG 0.50 06/19/2015     Results for orders placed or performed in visit on 11/07/16   Prostate Specific Antigen, Diagnostic   Result Value Ref Range    PSA  DIAGNOSTIC 0.82 0.00 - 4.00 ng/mL   Results for orders placed or performed in visit on 06/19/15   Prostate Specific Antigen, Diagnostic   Result Value Ref Range    PSA DIAGNOSTIC 0.50 0.00 - 4.00 ng/mL     Lab Results   Component Value Date    CREATININE 1.1 10/08/2019    CREATININE 1.3 10/07/2019    CREATININE 1.1 10/01/2019     No results found for this or any previous visit.  Urine Culture, Routine   Date Value Ref Range Status   10/07/2019 PSEUDOMONAS AERUGINOSA  10,000 - 49,999 cfu/ml   (A)  Final   10/07/2019 ENTEROCOCCUS FAECALIS  10,000 - 49,999 cfu/ml   (A)  Final     No results found for: HGBA1C    Assessment and Plan:  James was seen today for bladder pain and urethral stricture.    Diagnoses and all orders for this visit:    Acute prostatitis  -     cefpodoxime (VANTIN) 200 MG tablet; Take 1 tablet (200 mg total) by mouth 2 (two) times daily.  -     Cystoscopy; Future    BPH with urinary obstruction  -     Prostate Specific Antigen, Diagnostic; Future    Postprocedural male urethral stricture    Other orders  -     lidocaine HCl 2% urojet  -     doxycycline tablet 100 mg    recently grew pseudomonas and enterococcus.  Responded well to Vantin.  Will continue Vantin 15 more days.  After abx course is completed, will check PSA and plan cysto.    Follow-up:  Follow up in about 4 weeks (around 11/8/2019), or cysto, for PSA.

## 2019-10-13 LAB
BACTERIA BLD CULT: NORMAL
BACTERIA BLD CULT: NORMAL

## 2019-10-17 ENCOUNTER — OFFICE VISIT (OUTPATIENT)
Dept: FAMILY MEDICINE | Facility: CLINIC | Age: 74
End: 2019-10-17
Payer: MEDICARE

## 2019-10-17 ENCOUNTER — TELEPHONE (OUTPATIENT)
Dept: FAMILY MEDICINE | Facility: CLINIC | Age: 74
End: 2019-10-17

## 2019-10-17 VITALS
RESPIRATION RATE: 16 BRPM | HEART RATE: 76 BPM | DIASTOLIC BLOOD PRESSURE: 78 MMHG | SYSTOLIC BLOOD PRESSURE: 136 MMHG | HEIGHT: 66 IN | BODY MASS INDEX: 25.97 KG/M2 | WEIGHT: 161.63 LBS

## 2019-10-17 DIAGNOSIS — N39.0 RECURRENT UTI: ICD-10-CM

## 2019-10-17 DIAGNOSIS — R10.2 PELVIC PAIN: ICD-10-CM

## 2019-10-17 DIAGNOSIS — Z09 HOSPITAL DISCHARGE FOLLOW-UP: Primary | ICD-10-CM

## 2019-10-17 PROCEDURE — 3075F PR MOST RECENT SYSTOLIC BLOOD PRESS GE 130-139MM HG: ICD-10-PCS | Mod: HCNC,CPTII,S$GLB, | Performed by: FAMILY MEDICINE

## 2019-10-17 PROCEDURE — 1101F PT FALLS ASSESS-DOCD LE1/YR: CPT | Mod: HCNC,CPTII,S$GLB, | Performed by: FAMILY MEDICINE

## 2019-10-17 PROCEDURE — 99214 PR OFFICE/OUTPT VISIT, EST, LEVL IV, 30-39 MIN: ICD-10-PCS | Mod: HCNC,S$GLB,, | Performed by: FAMILY MEDICINE

## 2019-10-17 PROCEDURE — 99214 OFFICE O/P EST MOD 30 MIN: CPT | Mod: HCNC,S$GLB,, | Performed by: FAMILY MEDICINE

## 2019-10-17 PROCEDURE — 99999 PR PBB SHADOW E&M-EST. PATIENT-LVL IV: ICD-10-PCS | Mod: PBBFAC,HCNC,, | Performed by: FAMILY MEDICINE

## 2019-10-17 PROCEDURE — 3078F PR MOST RECENT DIASTOLIC BLOOD PRESSURE < 80 MM HG: ICD-10-PCS | Mod: HCNC,CPTII,S$GLB, | Performed by: FAMILY MEDICINE

## 2019-10-17 PROCEDURE — 3075F SYST BP GE 130 - 139MM HG: CPT | Mod: HCNC,CPTII,S$GLB, | Performed by: FAMILY MEDICINE

## 2019-10-17 PROCEDURE — 3078F DIAST BP <80 MM HG: CPT | Mod: HCNC,CPTII,S$GLB, | Performed by: FAMILY MEDICINE

## 2019-10-17 PROCEDURE — 99999 PR PBB SHADOW E&M-EST. PATIENT-LVL IV: CPT | Mod: PBBFAC,HCNC,, | Performed by: FAMILY MEDICINE

## 2019-10-17 PROCEDURE — 1101F PR PT FALLS ASSESS DOC 0-1 FALLS W/OUT INJ PAST YR: ICD-10-PCS | Mod: HCNC,CPTII,S$GLB, | Performed by: FAMILY MEDICINE

## 2019-10-17 RX ORDER — TRAMADOL HYDROCHLORIDE 50 MG/1
50 TABLET ORAL EVERY 6 HOURS PRN
Qty: 60 TABLET | Refills: 0 | Status: SHIPPED | OUTPATIENT
Start: 2019-10-17 | End: 2019-10-22 | Stop reason: SDUPTHER

## 2019-10-17 NOTE — TELEPHONE ENCOUNTER
----- Message from Lilly Newberry sent at 10/17/2019 12:28 PM CDT -----  Contact: Self  James Quan Jr.  MRN: [id number]  : 1945  PCP: Alex Mcallister  Home Phone      [phone]  Work Phone      Not on file.  Mobile          [phone]      MESSAGE:   Patient would like to speak to Summer Feliciano or Dr Mcallister regarding scheduling a colonoscopy. Please advise. [phone]

## 2019-10-17 NOTE — PROGRESS NOTES
Subjective:       Patient ID: James Quan Jr. is a 74 y.o. male.    Chief Complaint: discuss colonoscopy    Pt is a 74 y.o. male who presents for evaluation and management of   Encounter Diagnoses   Name Primary?    Hospital discharge follow-up Yes    Recurrent UTI     Pelvic pain    .  Doing well on current meds. Denies any side effects. Prevention is up to date.  Review of Systems   Constitutional: Positive for fever. Negative for chills.   Genitourinary: Negative for dysuria.       Objective:      Physical Exam   Constitutional: He is oriented to person, place, and time. He appears well-developed and well-nourished.   HENT:   Head: Normocephalic and atraumatic.   Right Ear: External ear normal.   Left Ear: External ear normal.   Nose: Nose normal.   Mouth/Throat: Oropharynx is clear and moist.   Eyes: Pupils are equal, round, and reactive to light. Conjunctivae and EOM are normal. Right eye exhibits no discharge. Left eye exhibits no discharge. No scleral icterus.   Neck: Normal range of motion. Neck supple. No JVD present. No tracheal deviation present. No thyromegaly present.   Cardiovascular: Normal rate, regular rhythm, normal heart sounds and intact distal pulses.   No murmur heard.  Pulmonary/Chest: Effort normal and breath sounds normal. No respiratory distress. He has no wheezes. He has no rales. He exhibits no tenderness.   Abdominal: Soft. Bowel sounds are normal. He exhibits no distension and no mass. There is no tenderness. There is no rebound and no guarding.   Genitourinary:   Genitourinary Comments: Suprapubic TTP    Musculoskeletal: Normal range of motion.   Lymphadenopathy:     He has no cervical adenopathy.   Neurological: He is alert and oriented to person, place, and time. He has normal reflexes. He displays normal reflexes. No cranial nerve deficit. He exhibits normal muscle tone. Coordination normal.   Skin: Skin is warm and dry.   Psychiatric: He has a normal mood and affect.  His behavior is normal. Judgment and thought content normal.       Assessment:       1. Hospital discharge follow-up    2. Recurrent UTI    3. Pelvic pain        Plan:   James was seen today for discuss colonoscopy.    Diagnoses and all orders for this visit:    Hospital discharge follow-up    Recurrent UTI    Pelvic pain    Other orders  -     traMADol (ULTRAM) 50 mg tablet; Take 1 tablet (50 mg total) by mouth every 6 (six) hours as needed for Pain.      Problem List Items Addressed This Visit     None      Visit Diagnoses     Hospital discharge follow-up    -  Primary    Recurrent UTI        Pelvic pain            His pain is due to cystitis which seems to be chronic now   Has cysto scheduled on 11/7  Will treat pain with tramadol until.   Continue vantin   No follow-ups on file.

## 2019-10-21 ENCOUNTER — TELEPHONE (OUTPATIENT)
Dept: FAMILY MEDICINE | Facility: CLINIC | Age: 74
End: 2019-10-21

## 2019-10-21 RX ORDER — LIDOCAINE HYDROCHLORIDE 20 MG/ML
JELLY TOPICAL
Qty: 30 ML | Refills: 11 | Status: SHIPPED | OUTPATIENT
Start: 2019-10-21 | End: 2020-11-27 | Stop reason: ALTCHOICE

## 2019-10-21 NOTE — TELEPHONE ENCOUNTER
----- Message from Lilly Newberry sent at 10/21/2019  3:15 PM CDT -----  Contact: Pharmacy Fax Request  Pharmacy requesting prescription for patient.    Is this a refill or new RX:  refill  RX name and strength: lidocaine 2% jelly gel  Last office visit: 10/17/19  Last fill date: Not on list  Is this a 30-day or 90-day RX:  30  Pharmacy name and location:  [facility] in [place]

## 2019-10-22 ENCOUNTER — TELEPHONE (OUTPATIENT)
Dept: FAMILY MEDICINE | Facility: CLINIC | Age: 74
End: 2019-10-22

## 2019-10-22 DIAGNOSIS — R39.82 CHRONIC BLADDER PAIN: Primary | ICD-10-CM

## 2019-10-22 RX ORDER — TRAMADOL HYDROCHLORIDE 50 MG/1
50 TABLET ORAL EVERY 6 HOURS PRN
Qty: 60 TABLET | Refills: 0 | Status: SHIPPED | OUTPATIENT
Start: 2019-10-22 | End: 2019-12-18 | Stop reason: SDUPTHER

## 2019-10-22 NOTE — TELEPHONE ENCOUNTER
Spoke to the pharmacist at [facility], She stated that pt needs another Rx sent in for tramadol. Stated that there was no Dx code on it so they could only give a 7 day supply.  Please send in another Rx, and put a Dx code so he can get a full month supply. Thank you

## 2019-10-22 NOTE — TELEPHONE ENCOUNTER
----- Message from Rula Robins sent at 10/22/2019 12:14 PM CDT -----  Contact: self  Jamse Quan Jr.  MRN: [id number]  : 1945  PCP: Alex Mcallister  Home Phone      [phone]  Work Phone      Not on file.  Mobile          [phone]      MESSAGE:   Patient asking for a call from nurse to discuss traMADol (ULTRAM) 50 mg tablet.    [phone]

## 2019-11-07 ENCOUNTER — HOSPITAL ENCOUNTER (OUTPATIENT)
Dept: UROLOGY | Facility: HOSPITAL | Age: 74
Discharge: HOME | End: 2019-11-07
Attending: UROLOGY
Payer: MEDICARE

## 2019-11-07 VITALS
RESPIRATION RATE: 16 BRPM | TEMPERATURE: 97 F | WEIGHT: 163 LBS | SYSTOLIC BLOOD PRESSURE: 143 MMHG | HEART RATE: 78 BPM | BODY MASS INDEX: 26.2 KG/M2 | HEIGHT: 66 IN | DIASTOLIC BLOOD PRESSURE: 68 MMHG

## 2019-11-07 DIAGNOSIS — N30.00 ACUTE CYSTITIS WITHOUT HEMATURIA: Primary | ICD-10-CM

## 2019-11-07 DIAGNOSIS — R10.2 ACUTE SUPRAPUBIC PAIN: ICD-10-CM

## 2019-11-07 DIAGNOSIS — C61 CA PROSTATE, ADENOCA: ICD-10-CM

## 2019-11-07 DIAGNOSIS — N39.0 RECURRENT UTI: ICD-10-CM

## 2019-11-07 DIAGNOSIS — N41.0 ACUTE PROSTATITIS: ICD-10-CM

## 2019-11-07 DIAGNOSIS — N41.0 ACUTE ON CHRONIC PROSTATITIS: ICD-10-CM

## 2019-11-07 DIAGNOSIS — N41.1 ACUTE ON CHRONIC PROSTATITIS: ICD-10-CM

## 2019-11-07 PROCEDURE — 52000 CYSTOURETHROSCOPY: CPT | Mod: HCNC

## 2019-11-07 PROCEDURE — 87086 URINE CULTURE/COLONY COUNT: CPT | Mod: HCNC

## 2019-11-07 PROCEDURE — 52000 CYSTOURETHROSCOPY: CPT | Mod: HCNC,,, | Performed by: UROLOGY

## 2019-11-07 PROCEDURE — 52000 PR CYSTOURETHROSCOPY: ICD-10-PCS | Mod: HCNC,,, | Performed by: UROLOGY

## 2019-11-07 RX ORDER — DOXYCYCLINE HYCLATE 100 MG
100 TABLET ORAL ONCE
Status: COMPLETED | OUTPATIENT
Start: 2019-11-07 | End: 2019-11-07

## 2019-11-07 RX ORDER — ROSUVASTATIN CALCIUM 10 MG/1
10 TABLET, COATED ORAL NIGHTLY
Qty: 30 TABLET | Refills: 0 | Status: SHIPPED | OUTPATIENT
Start: 2019-11-07 | End: 2019-12-11

## 2019-11-07 RX ORDER — LIDOCAINE HYDROCHLORIDE 20 MG/ML
JELLY TOPICAL ONCE
Status: COMPLETED | OUTPATIENT
Start: 2019-11-07 | End: 2019-11-07

## 2019-11-07 RX ADMIN — LIDOCAINE HYDROCHLORIDE: 20 JELLY TOPICAL at 10:11

## 2019-11-07 RX ADMIN — Medication 100 MG: at 10:11

## 2019-11-07 NOTE — PATIENT INSTRUCTIONS
What to Expect After a Cystoscopy  For the next 24-48 hours, you may feel a mild burning when you urinate. This burning is normal and expected. Drink plenty of water to dilute the urine to help relieve the burning sensation. You may also see a small amount of blood in your urine after the procedure.    Unless you are already taking antibiotics, you may be given an antibiotic after the test to prevent infection.    Signs and Symptoms to Report  Call the [facility] at [phone] if you develop any of the following:  · Fever of 101 degrees or higher  · Chills or persistent bleeding  · Inability to urinate

## 2019-11-07 NOTE — PROCEDURES
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   Sphincter: competent.  Prostate: Estimated Length Prostatic Urethra: s/p TURP with open channel.  But possible necrotic tissues in the prostate fossa with a few stones stuck to this necrotic tissues. No obstruction seen.    Bladder neck: patent with no stricture  Bladder:  Normal bladder.   Normal ureteral orifices bilaterally.   Moderate trabeculation.     Description of Procedure:                                                         Informed Consent:                                                            - Risks, benefits and alternatives of procedure discussed with               patient and informed consent obtained.       Patient Position:   - Supine. --- Bladder ---   Prep and Drape:   - Patient prepped and draped in usual sterile fashion using povidone     iodine (Betadine).   Instruments:   - 16 Fr flexible cystoscope with 0 degree lens.   Procedure Details:   - Cystoscope passed under vision into bladder.   - Bladder and urethra examined in their entirety with findings as     above.     Conclusion:  1. Necrotic prostate tissues and stones resulting in recurrent prostatitis.  2. Suprapubic pain  Recommend colonoscopy.  Will further evaluate him With CT of pelvis    Plan:  Patient was discharged home in a stable condition.  Medications: doxy  Follow up:  CT pelvis    Acute cystitis without hematuria    Acute prostatitis  -     Cystoscopy; Standing  -     Cystoscopy    Recurrent UTI  -     Urine culture    Acute on chronic prostatitis    Acute suprapubic pain  -     CT Pelvis W Wo  Contrast; Future; Expected date: 11/07/2019    CA prostate, adenoca  -     CT Pelvis W Wo   Contrast; Future; Expected date: 11/07/2019    Other orders  -     doxycycline tablet 100 mg  -     lidocaine HCl 2% urojet

## 2019-11-08 LAB
BACTERIA UR CULT: NORMAL
BACTERIA UR CULT: NORMAL

## 2019-11-19 ENCOUNTER — LAB VISIT (OUTPATIENT)
Dept: LAB | Facility: HOSPITAL | Age: 74
End: 2019-11-19
Attending: UROLOGY
Payer: MEDICARE

## 2019-11-19 ENCOUNTER — TELEPHONE (OUTPATIENT)
Dept: UROLOGY | Facility: CLINIC | Age: 74
End: 2019-11-19

## 2019-11-19 DIAGNOSIS — N30.00 ACUTE CYSTITIS WITHOUT HEMATURIA: Primary | ICD-10-CM

## 2019-11-19 DIAGNOSIS — N30.00 ACUTE CYSTITIS WITHOUT HEMATURIA: ICD-10-CM

## 2019-11-19 PROCEDURE — 87086 URINE CULTURE/COLONY COUNT: CPT | Mod: HCNC

## 2019-11-19 NOTE — TELEPHONE ENCOUNTER
----- Message from Janice Stark LPN sent at 11/18/2019  9:25 AM CST -----  Contact: pt  Pt c/o dysuria and odor. He does sic once a week for hx stricture. He states you gave him vantin last month, it worked , but was very expensive -over $100 . I advised him to start AZO for now   ----- Message -----  From: David Knutson  Sent: 11/18/2019   8:58 AM CST  To: [facility]    Pt would like to let Dr. Wells know that he has another urinary infection. Please give pt a call back at [phone] for any questions or advice.

## 2019-11-19 NOTE — TELEPHONE ENCOUNTER
Acute cystitis without hematuria  -     Urine culture; Future; Expected date: 11/19/2019    please ask him to drop off urine for culture today.

## 2019-11-21 LAB
BACTERIA UR CULT: NORMAL
BACTERIA UR CULT: NORMAL

## 2019-12-03 ENCOUNTER — TELEPHONE (OUTPATIENT)
Dept: ADMINISTRATIVE | Facility: HOSPITAL | Age: 74
End: 2019-12-03

## 2019-12-06 ENCOUNTER — HOSPITAL ENCOUNTER (OUTPATIENT)
Dept: RADIOLOGY | Facility: HOSPITAL | Age: 74
Discharge: HOME | End: 2019-12-06
Attending: UROLOGY
Payer: MEDICARE

## 2019-12-06 DIAGNOSIS — R10.2 ACUTE SUPRAPUBIC PAIN: ICD-10-CM

## 2019-12-06 DIAGNOSIS — C61 CA PROSTATE, ADENOCA: ICD-10-CM

## 2019-12-06 PROCEDURE — 72194 CT PELVIS WITH AND WITHOUT CONTRAST: ICD-10-PCS | Mod: 26,HCNC,, | Performed by: RADIOLOGY

## 2019-12-06 PROCEDURE — 25500020 PHARM REV CODE 255: Mod: HCNC | Performed by: UROLOGY

## 2019-12-06 PROCEDURE — 72194 CT PELVIS W/O & W/DYE: CPT | Mod: 26,HCNC,, | Performed by: RADIOLOGY

## 2019-12-06 PROCEDURE — 72194 CT PELVIS W/O & W/DYE: CPT | Mod: TC,HCNC

## 2019-12-06 RX ADMIN — IOHEXOL 30 ML: 350 INJECTION, SOLUTION INTRAVENOUS at 09:12

## 2019-12-06 RX ADMIN — IOHEXOL 75 ML: 350 INJECTION, SOLUTION INTRAVENOUS at 09:12

## 2019-12-10 ENCOUNTER — OFFICE VISIT (OUTPATIENT)
Dept: FAMILY MEDICINE | Facility: CLINIC | Age: 74
End: 2019-12-10
Payer: MEDICARE

## 2019-12-10 VITALS
DIASTOLIC BLOOD PRESSURE: 68 MMHG | HEIGHT: 66 IN | RESPIRATION RATE: 16 BRPM | BODY MASS INDEX: 26.96 KG/M2 | HEART RATE: 64 BPM | WEIGHT: 167.75 LBS | SYSTOLIC BLOOD PRESSURE: 134 MMHG

## 2019-12-10 DIAGNOSIS — M54.50 ACUTE RIGHT-SIDED LOW BACK PAIN WITHOUT SCIATICA: Primary | ICD-10-CM

## 2019-12-10 DIAGNOSIS — M46.1 SACROILIAC INFLAMMATION: ICD-10-CM

## 2019-12-10 PROCEDURE — 1101F PR PT FALLS ASSESS DOC 0-1 FALLS W/OUT INJ PAST YR: ICD-10-PCS | Mod: HCNC,CPTII,S$GLB, | Performed by: FAMILY MEDICINE

## 2019-12-10 PROCEDURE — 99499 RISK ADDL DX/OHS AUDIT: ICD-10-PCS | Mod: HCNC,S$GLB,, | Performed by: FAMILY MEDICINE

## 2019-12-10 PROCEDURE — 1159F MED LIST DOCD IN RCRD: CPT | Mod: HCNC,S$GLB,, | Performed by: FAMILY MEDICINE

## 2019-12-10 PROCEDURE — 1101F PT FALLS ASSESS-DOCD LE1/YR: CPT | Mod: HCNC,CPTII,S$GLB, | Performed by: FAMILY MEDICINE

## 2019-12-10 PROCEDURE — 99499 UNLISTED E&M SERVICE: CPT | Mod: HCNC,S$GLB,, | Performed by: FAMILY MEDICINE

## 2019-12-10 PROCEDURE — 3075F PR MOST RECENT SYSTOLIC BLOOD PRESS GE 130-139MM HG: ICD-10-PCS | Mod: HCNC,CPTII,S$GLB, | Performed by: FAMILY MEDICINE

## 2019-12-10 PROCEDURE — 1125F PR PAIN SEVERITY QUANTIFIED, PAIN PRESENT: ICD-10-PCS | Mod: HCNC,S$GLB,, | Performed by: FAMILY MEDICINE

## 2019-12-10 PROCEDURE — 3078F DIAST BP <80 MM HG: CPT | Mod: HCNC,CPTII,S$GLB, | Performed by: FAMILY MEDICINE

## 2019-12-10 PROCEDURE — 99999 PR PBB SHADOW E&M-EST. PATIENT-LVL III: ICD-10-PCS | Mod: PBBFAC,HCNC,, | Performed by: FAMILY MEDICINE

## 2019-12-10 PROCEDURE — 99213 OFFICE O/P EST LOW 20 MIN: CPT | Mod: HCNC,25,S$GLB, | Performed by: FAMILY MEDICINE

## 2019-12-10 PROCEDURE — 99213 PR OFFICE/OUTPT VISIT, EST, LEVL III, 20-29 MIN: ICD-10-PCS | Mod: HCNC,25,S$GLB, | Performed by: FAMILY MEDICINE

## 2019-12-10 PROCEDURE — 96372 THER/PROPH/DIAG INJ SC/IM: CPT | Mod: HCNC,S$GLB,, | Performed by: FAMILY MEDICINE

## 2019-12-10 PROCEDURE — 1125F AMNT PAIN NOTED PAIN PRSNT: CPT | Mod: HCNC,S$GLB,, | Performed by: FAMILY MEDICINE

## 2019-12-10 PROCEDURE — 99999 PR PBB SHADOW E&M-EST. PATIENT-LVL III: CPT | Mod: PBBFAC,HCNC,, | Performed by: FAMILY MEDICINE

## 2019-12-10 PROCEDURE — 3075F SYST BP GE 130 - 139MM HG: CPT | Mod: HCNC,CPTII,S$GLB, | Performed by: FAMILY MEDICINE

## 2019-12-10 PROCEDURE — 96372 PR INJECTION,THERAP/PROPH/DIAG2ST, IM OR SUBCUT: ICD-10-PCS | Mod: HCNC,S$GLB,, | Performed by: FAMILY MEDICINE

## 2019-12-10 PROCEDURE — 1159F PR MEDICATION LIST DOCUMENTED IN MEDICAL RECORD: ICD-10-PCS | Mod: HCNC,S$GLB,, | Performed by: FAMILY MEDICINE

## 2019-12-10 PROCEDURE — 3078F PR MOST RECENT DIASTOLIC BLOOD PRESSURE < 80 MM HG: ICD-10-PCS | Mod: HCNC,CPTII,S$GLB, | Performed by: FAMILY MEDICINE

## 2019-12-10 RX ORDER — METHYLPREDNISOLONE ACETATE 40 MG/ML
60 INJECTION, SUSPENSION INTRA-ARTICULAR; INTRALESIONAL; INTRAMUSCULAR; SOFT TISSUE
Status: COMPLETED | OUTPATIENT
Start: 2019-12-10 | End: 2019-12-10

## 2019-12-10 RX ADMIN — METHYLPREDNISOLONE ACETATE 60 MG: 40 INJECTION, SUSPENSION INTRA-ARTICULAR; INTRALESIONAL; INTRAMUSCULAR; SOFT TISSUE at 05:12

## 2019-12-10 NOTE — PROGRESS NOTES
Subjective:       Patient ID: James Quan Jr. is a 74 y.o. male.    Chief Complaint: Back Pain    Pt is a 74 y.o. male who presents for evaluation and management of   Encounter Diagnosis   Name Primary?    Acute right-sided low back pain without sciatica Yes   .  Doing well on current meds. Denies any side effects. Prevention is up to date.    Review of Systems   Musculoskeletal: Positive for back pain.   Neurological: Negative for weakness and numbness.       Objective:      Physical Exam   Constitutional: He is oriented to person, place, and time. He appears well-developed and well-nourished.   HENT:   Head: Normocephalic and atraumatic.   Eyes: Conjunctivae are normal.   Neck: Normal range of motion. Neck supple.   Cardiovascular: Normal rate, regular rhythm and normal heart sounds. Exam reveals no gallop.   No murmur heard.  Pulmonary/Chest: Effort normal and breath sounds normal.   Musculoskeletal: Normal range of motion. He exhibits tenderness.   Pos PAULETTE  Pos [place] finger   Neg SLR   Neurological: He is alert and oriented to person, place, and time.   Skin: Skin is warm and dry.   Psychiatric: He has a normal mood and affect. His behavior is normal.       Assessment:       1. Acute right-sided low back pain without sciatica        Plan:   James was seen today for back pain.    Diagnoses and all orders for this visit:    Acute right-sided low back pain without sciatica  -     methylPREDNISolone acetate injection 60 mg    Sacroiliac inflammation  -     methylPREDNISolone acetate injection 60 mg      Problem List Items Addressed This Visit     None      Visit Diagnoses     Acute right-sided low back pain without sciatica    -  Primary    Relevant Medications    methylPREDNISolone acetate injection 60 mg (Start on 12/10/2019  5:30 PM)    Sacroiliac inflammation        Relevant Medications    methylPREDNISolone acetate injection 60 mg (Start on 12/10/2019  5:30 PM)      refuses PT  Will give him  some home exercises     No follow-ups on file.

## 2019-12-11 RX ORDER — ROSUVASTATIN CALCIUM 10 MG/1
10 TABLET, COATED ORAL NIGHTLY
Qty: 30 TABLET | Refills: 0 | Status: SHIPPED | OUTPATIENT
Start: 2019-12-11 | End: 2019-12-26

## 2019-12-13 ENCOUNTER — OFFICE VISIT (OUTPATIENT)
Dept: UROLOGY | Facility: CLINIC | Age: 74
End: 2019-12-13
Payer: MEDICARE

## 2019-12-13 VITALS
WEIGHT: 166.88 LBS | BODY MASS INDEX: 26.82 KG/M2 | SYSTOLIC BLOOD PRESSURE: 139 MMHG | HEART RATE: 67 BPM | HEIGHT: 66 IN | DIASTOLIC BLOOD PRESSURE: 75 MMHG

## 2019-12-13 DIAGNOSIS — M86.9 OSTEOMYELITIS OF SYMPHYSIS PUBIS: Primary | ICD-10-CM

## 2019-12-13 DIAGNOSIS — Z92.3 HX OF RADIATION THERAPY: ICD-10-CM

## 2019-12-13 DIAGNOSIS — C61 CA PROSTATE, ADENOCA: ICD-10-CM

## 2019-12-13 PROCEDURE — 1101F PR PT FALLS ASSESS DOC 0-1 FALLS W/OUT INJ PAST YR: ICD-10-PCS | Mod: HCNC,CPTII,S$GLB, | Performed by: UROLOGY

## 2019-12-13 PROCEDURE — 99999 PR PBB SHADOW E&M-EST. PATIENT-LVL V: ICD-10-PCS | Mod: PBBFAC,HCNC,, | Performed by: UROLOGY

## 2019-12-13 PROCEDURE — 99214 OFFICE O/P EST MOD 30 MIN: CPT | Mod: HCNC,S$GLB,, | Performed by: UROLOGY

## 2019-12-13 PROCEDURE — 1126F AMNT PAIN NOTED NONE PRSNT: CPT | Mod: HCNC,S$GLB,, | Performed by: UROLOGY

## 2019-12-13 PROCEDURE — 1159F MED LIST DOCD IN RCRD: CPT | Mod: HCNC,S$GLB,, | Performed by: UROLOGY

## 2019-12-13 PROCEDURE — 99999 PR PBB SHADOW E&M-EST. PATIENT-LVL V: CPT | Mod: PBBFAC,HCNC,, | Performed by: UROLOGY

## 2019-12-13 PROCEDURE — 99499 RISK ADDL DX/OHS AUDIT: ICD-10-PCS | Mod: HCNC,S$GLB,, | Performed by: UROLOGY

## 2019-12-13 PROCEDURE — 3075F SYST BP GE 130 - 139MM HG: CPT | Mod: HCNC,CPTII,S$GLB, | Performed by: UROLOGY

## 2019-12-13 PROCEDURE — 1159F PR MEDICATION LIST DOCUMENTED IN MEDICAL RECORD: ICD-10-PCS | Mod: HCNC,S$GLB,, | Performed by: UROLOGY

## 2019-12-13 PROCEDURE — 1126F PR PAIN SEVERITY QUANTIFIED, NO PAIN PRESENT: ICD-10-PCS | Mod: HCNC,S$GLB,, | Performed by: UROLOGY

## 2019-12-13 PROCEDURE — 3075F PR MOST RECENT SYSTOLIC BLOOD PRESS GE 130-139MM HG: ICD-10-PCS | Mod: HCNC,CPTII,S$GLB, | Performed by: UROLOGY

## 2019-12-13 PROCEDURE — 99499 UNLISTED E&M SERVICE: CPT | Mod: HCNC,S$GLB,, | Performed by: UROLOGY

## 2019-12-13 PROCEDURE — 1101F PT FALLS ASSESS-DOCD LE1/YR: CPT | Mod: HCNC,CPTII,S$GLB, | Performed by: UROLOGY

## 2019-12-13 PROCEDURE — 3078F DIAST BP <80 MM HG: CPT | Mod: HCNC,CPTII,S$GLB, | Performed by: UROLOGY

## 2019-12-13 PROCEDURE — 3078F PR MOST RECENT DIASTOLIC BLOOD PRESSURE < 80 MM HG: ICD-10-PCS | Mod: HCNC,CPTII,S$GLB, | Performed by: UROLOGY

## 2019-12-13 PROCEDURE — 99214 PR OFFICE/OUTPT VISIT, EST, LEVL IV, 30-39 MIN: ICD-10-PCS | Mod: HCNC,S$GLB,, | Performed by: UROLOGY

## 2019-12-13 NOTE — PROGRESS NOTES
CC: suprapubic area pain, sacral pain    James Quan Jr. is a 74 y.o. man who is here for the evaluation of recurrent UTI, suprapubic pain.    He was evaluated by me on 11/7/19 with cysto.  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   Sphincter: competent.  Prostate: Estimated Length Prostatic Urethra: s/p TURP with open channel.  But possible necrotic tissues in the prostate fossa with a few stones stuck to this necrotic tissues. No obstruction seen.    Bladder neck: patent with no stricture  Bladder:  Normal bladder.   Normal ureteral orifices bilaterally.   Moderate trabeculation.    He is here today with CT of pelvis.  Pt reports that he no longer has dysuria or problems with urination.  However, still has     a diagnosis of adenocarcinoma the prostate having been made in 2002.  The patient underwent external beam radiation therapy.the patient is also status post TURP.  The patient is incontinent but does urinate on his own periodically.  The patient has documented urethral stricture disease and catheterizes himself once a week.  He states that he meets minimal resistance.  Pt has seen me in the past (2014) pt has hx prostate cancer, stricture and xrt. Pt also has  Incontinence. Dr zhang would like pt to see dr wells tomorrow if possible.  Pt is complaining of pain involving the lower abdomen and groin area.  Has been on Vantin for 1 wk and took Azo for dysuria.  Overall his pain is better.    Denies flank pain, hematuria.      Past Medical History:   Diagnosis Date    Anemia     Aplasia bone marrow     Asthma     CA of prostate     CML (chronic myeloid leukemia)     Coronary artery disease     Heart attack 6/17/2014    Heart failure     Hyperlipidemia     Hypertension      Hypothyroidism     Kidney stones     Prostate cancer     Thyroid disease     Urinary incontinence      Past Surgical History:   Procedure Laterality Date    APPENDECTOMY  1966    ATRIAL CARDIAC PACEMAKER INSERTION  10/2018    CHOLECYSTECTOMY  1980    CORONARY ARTERY BYPASS GRAFT      CORONARY STENT PLACEMENT      CYSTOSCOPY      heart attack  2014    PROSTATE SURGERY      TURP    radiation      for ca prostate    TONSILLECTOMY      As child    TRANSURETHRAL RESECTION OF PROSTATE      x3    TRANSURETHRAL RESECTION OF PROSTATE      urethral dilation      VASECTOMY  1979     Social History     Tobacco Use    Smoking status: Former Smoker     Packs/day: 1.00     Years: 25.00     Pack years: 25.00     Types: Cigarettes     Last attempt to quit: 1988     Years since quittin.6    Smokeless tobacco: Never Used   Substance Use Topics    Alcohol use: No    Drug use: No     Family History   Problem Relation Age of Onset    Diabetes Father      Allergy:  Review of patient's allergies indicates:   Allergen Reactions    Niacin preparations Rash    Bactrim [sulfamethoxazole-trimethoprim] Hives and Itching     Outpatient Encounter Medications as of 2019   Medication Sig Dispense Refill    ascorbic acid, vitamin C, (VITAMIN C) 100 MG tablet Take 100 mg by mouth once daily.      aspirin (ECOTRIN) 81 MG EC tablet Take 81 mg by mouth once daily.      b complex vitamins tablet Take 1 tablet by mouth once daily.      beta carotene 91233 UNIT capsule Take 10,000 Units by mouth once daily.       catheter (BARD RUBBER UTILITY CATHETER) 14 Fr Misc 1 Units by Misc.(Non-Drug; Combo Route) route every 7 days. (Patient taking differently: 1 Units by Misc.(Non-Drug; Combo Route) route every other day. ) 30 each 3    cyanocobalamin (VITAMIN B-12) 1000 MCG tablet Take 1,000 mcg by mouth nightly.       ferrous sulfate 325 (65 FE) MG EC tablet Take 325 mg by mouth once daily.      fish  oil-omega-3 fatty acids 300-1,000 mg capsule Take by mouth once daily.      folic acid (FOLVITE) 400 MCG tablet Take 400 mcg by mouth once daily.      GLEEVEC 400 mg Tab 400 mg once daily.       isosorbide mononitrate (IMDUR) 30 MG 24 hr tablet Take 30 mg by mouth once daily.      levothyroxine (SYNTHROID) 100 MCG tablet TAKE 1 TABLET EVERY DAY 90 tablet 3    lidocaine HCL 2% (XYLOCAINE) 2 % jelly Apply topically as needed. 30 mL 11    losartan (COZAAR) 100 MG tablet Take 1 tablet (100 mg total) by mouth once daily. 30 tablet 0    lycopene 10 mg Cap Take 1 capsule by mouth nightly.       meclizine (ANTIVERT) 25 mg tablet Take 1 tablet (25 mg total) by mouth 3 (three) times daily as needed. 30 tablet 0    NITROSTAT 0.4 mg SL tablet Place 0.4 mg under the tongue every 5 (five) minutes as needed.       PRASTERONE, DHEA, (DHEA ORAL) Take 25 mg by mouth once daily. 50 mg. tablet       RANEXA 1,000 mg Tb12 Take 500 mg by mouth 2 (two) times daily.       rosuvastatin (CRESTOR) 10 MG tablet Take 1 tablet (10 mg total) by mouth every evening. 30 tablet 0    traMADol (ULTRAM) 50 mg tablet Take 1 tablet (50 mg total) by mouth every 6 (six) hours as needed for Pain. 60 tablet 0    vitamin D (VITAMIN D3) 1000 units Tab Take 2,000 Units by mouth once daily.       No facility-administered encounter medications on file as of 12/13/2019.      Review of Systems   ROS  Physical Exam     Vitals:    12/13/19 1010   BP: 139/75   Pulse: 67     Physical Exam   Constitutional: He is oriented to person, place, and time. He appears well-developed and well-nourished. No distress.   HENT:   Head: Normocephalic and atraumatic.   Right Ear: External ear normal.   Left Ear: External ear normal.   Nose: Nose normal.   Mouth/Throat: Oropharynx is clear and moist.   Eyes: Conjunctivae are normal. Pupils are equal, round, and reactive to light.   Neck: Normal range of motion. Neck supple. No JVD present. No tracheal deviation present. No  thyromegaly present.   Cardiovascular: Normal rate, regular rhythm, normal heart sounds and intact distal pulses.  Exam reveals no gallop and no friction rub.    No murmur heard.  Pulmonary/Chest: Effort normal and breath sounds normal. No respiratory distress. He has no wheezes. He exhibits no tenderness.   Abdominal: Soft. Bowel sounds are normal. He exhibits no distension and no mass. There is no tenderness. There is no rebound and no guarding.   Genitourinary: Rectum normal and penis normal. No penile tenderness.   Genitourinary Comments: Prostate 40 grams with negative nodule or positive tenderness  Minimal EPS.  Under microscopic exam: no significant WBCs noted but suspicious GNR?   Musculoskeletal: Normal range of motion. He exhibits no edema, tenderness or deformity.   Lymphadenopathy:     He has no cervical adenopathy.   Neurological: He is alert and oriented to person, place, and time.   Skin: Skin is warm and dry. He is not diaphoretic.     Psychiatric: He has a normal mood and affect. His behavior is normal. Thought content normal.     Genitalia:  Scrotum: no rash or lesion  Normal symmetric epididymis without masses  Normal vas palpated  Normal size, symmetric testicles with no masses   Normal urethral meatus with no discharge  Normal circumcised penis with no lesion   Rectal:  Normal perineum and anus upon inspection.  Normal tone, no masses or tenderness;     LABS:  Lab Results   Component Value Date    PSA 0.37 04/23/2014    PSADIAG 0.44 12/06/2019    PSADIAG 0.82 11/07/2016    PSADIAG 0.50 06/19/2015     Results for orders placed or performed in visit on 12/06/19   Prostate Specific Antigen, Diagnostic   Result Value Ref Range    PSA DIAGNOSTIC 0.44 0.00 - 4.00 ng/mL   Results for orders placed or performed in visit on 11/07/16   Prostate Specific Antigen, Diagnostic   Result Value Ref Range    PSA DIAGNOSTIC 0.82 0.00 - 4.00 ng/mL   Results for orders placed or performed in visit on 06/19/15    Prostate Specific Antigen, Diagnostic   Result Value Ref Range    PSA DIAGNOSTIC 0.50 0.00 - 4.00 ng/mL     Lab Results   Component Value Date    CREATININE 1.4 12/06/2019    CREATININE 1.1 10/08/2019    CREATININE 1.3 10/07/2019     No results found for this or any previous visit.  Urine Culture, Routine   Date Value Ref Range Status   11/19/2019   Final    Multiple organisms isolated. None in predominance.  Repeat if   11/19/2019 clinically necessary.  Final     No results found for: HGBA1C  CT pelvis 12/6/19  There is new pubic symphyseal diastasis with periarticular sclerosis and lucency and some degree of fragmentation.  Whether this relates to contiguous osteomyelitis or osteonecrosis is uncertain in this patient with a history of prostate radiotherapy.  Inferior and posterior to the symphysis there is a small low-density area with small calcifications and minimal gas.  Whether this relates to infection or fistula is uncertain.    There's a small amount of air in the bladder in there's some bladder wall thickening    Degenerative changes are seen in the spine and hips and fat containing inguinal hernia formation is noted bilaterally.    Assessment and Plan:  James was seen today for follow-up.    Diagnoses and all orders for this visit:    Osteomyelitis of symphysis pubis  -     Ambulatory referral to Infectious Disease  -     Ambulatory referral/consult to Orthopedics; Future    Hx of radiation therapy    CA prostate, adenoca    I reviewed his CT pelvis in detail with him.    recurrent prostatitis.  Resolved at present time.  However, I am concerned that there may be an underlying disease involving the pubic bone.  This could be related to previous XRT for his prostate cancer.  Refer to Ortho and ID.    His PSA is stable for prostate cancer and hx of prostatitis.  Will follow up in 3 months with me.  I spent 40 minutes with the patient of which more than half was spent in direct consultation with the  patient in regards to our treatment and plan.    Follow-up:  Follow up Ortho, ID.

## 2019-12-18 DIAGNOSIS — R39.82 CHRONIC BLADDER PAIN: ICD-10-CM

## 2019-12-18 RX ORDER — TRAMADOL HYDROCHLORIDE 50 MG/1
TABLET ORAL
Qty: 60 TABLET | Refills: 0 | Status: SHIPPED | OUTPATIENT
Start: 2019-12-18 | End: 2020-06-17

## 2019-12-19 ENCOUNTER — TELEPHONE (OUTPATIENT)
Dept: FAMILY MEDICINE | Facility: CLINIC | Age: 74
End: 2019-12-19

## 2019-12-19 ENCOUNTER — OFFICE VISIT (OUTPATIENT)
Dept: INFECTIOUS DISEASES | Facility: CLINIC | Age: 74
End: 2019-12-19
Payer: MEDICARE

## 2019-12-19 VITALS
HEART RATE: 91 BPM | BODY MASS INDEX: 26.14 KG/M2 | HEIGHT: 66 IN | WEIGHT: 162.69 LBS | DIASTOLIC BLOOD PRESSURE: 70 MMHG | SYSTOLIC BLOOD PRESSURE: 126 MMHG | TEMPERATURE: 98 F

## 2019-12-19 DIAGNOSIS — M89.9 PUBIC BONE PAIN: Primary | ICD-10-CM

## 2019-12-19 PROCEDURE — 3074F PR MOST RECENT SYSTOLIC BLOOD PRESSURE < 130 MM HG: ICD-10-PCS | Mod: HCNC,CPTII,S$GLB, | Performed by: INTERNAL MEDICINE

## 2019-12-19 PROCEDURE — 1125F AMNT PAIN NOTED PAIN PRSNT: CPT | Mod: HCNC,S$GLB,, | Performed by: INTERNAL MEDICINE

## 2019-12-19 PROCEDURE — 1159F MED LIST DOCD IN RCRD: CPT | Mod: HCNC,S$GLB,, | Performed by: INTERNAL MEDICINE

## 2019-12-19 PROCEDURE — 1101F PR PT FALLS ASSESS DOC 0-1 FALLS W/OUT INJ PAST YR: ICD-10-PCS | Mod: HCNC,CPTII,S$GLB, | Performed by: INTERNAL MEDICINE

## 2019-12-19 PROCEDURE — 99205 OFFICE O/P NEW HI 60 MIN: CPT | Mod: HCNC,S$GLB,, | Performed by: INTERNAL MEDICINE

## 2019-12-19 PROCEDURE — 99205 PR OFFICE/OUTPT VISIT, NEW, LEVL V, 60-74 MIN: ICD-10-PCS | Mod: HCNC,S$GLB,, | Performed by: INTERNAL MEDICINE

## 2019-12-19 PROCEDURE — 1159F PR MEDICATION LIST DOCUMENTED IN MEDICAL RECORD: ICD-10-PCS | Mod: HCNC,S$GLB,, | Performed by: INTERNAL MEDICINE

## 2019-12-19 PROCEDURE — 99999 PR PBB SHADOW E&M-EST. PATIENT-LVL IV: ICD-10-PCS | Mod: PBBFAC,HCNC,, | Performed by: INTERNAL MEDICINE

## 2019-12-19 PROCEDURE — 1101F PT FALLS ASSESS-DOCD LE1/YR: CPT | Mod: HCNC,CPTII,S$GLB, | Performed by: INTERNAL MEDICINE

## 2019-12-19 PROCEDURE — 3074F SYST BP LT 130 MM HG: CPT | Mod: HCNC,CPTII,S$GLB, | Performed by: INTERNAL MEDICINE

## 2019-12-19 PROCEDURE — 1125F PR PAIN SEVERITY QUANTIFIED, PAIN PRESENT: ICD-10-PCS | Mod: HCNC,S$GLB,, | Performed by: INTERNAL MEDICINE

## 2019-12-19 PROCEDURE — 99999 PR PBB SHADOW E&M-EST. PATIENT-LVL IV: CPT | Mod: PBBFAC,HCNC,, | Performed by: INTERNAL MEDICINE

## 2019-12-19 PROCEDURE — 3078F PR MOST RECENT DIASTOLIC BLOOD PRESSURE < 80 MM HG: ICD-10-PCS | Mod: HCNC,CPTII,S$GLB, | Performed by: INTERNAL MEDICINE

## 2019-12-19 PROCEDURE — 3078F DIAST BP <80 MM HG: CPT | Mod: HCNC,CPTII,S$GLB, | Performed by: INTERNAL MEDICINE

## 2019-12-19 NOTE — LETTER
December 23, 2019      Gilberto Wells MD  [address]  [facility] [address]           [facility]  [address]  [facility] [address]  Phone: [phone]  Fax: [phone]          Patient: James Quan Jr.   MR Number: [id number]   YOB: 1945   Date of Visit: 12/19/2019       Dear Dr. Gilberto Wells:    Thank you for referring James Quan to me for evaluation. Attached you will find relevant portions of my assessment and plan of care.    If you have questions, please do not hesitate to call me. I look forward to following James Quan along with you.    Sincerely,    Tian Mancilla MD    Enclosure  CC:  No Recipients    If you would like to receive this communication electronically, please contact [email or web address] or [phone] to request more information on [facility] access.    For providers and/or their staff who would like to refer a patient to [facility], please contact us through our one-stop-shop provider referral line, [facility], at [phone].    If you feel you have received this communication in error or would no longer like to receive these types of communications, please e-mail [email or web address]

## 2019-12-19 NOTE — PROGRESS NOTES
Infectious Diseases Clinic Note    Subjective:       Patient ID: James Quan Jr. is a 74 y.o. male.    Chief Complaint: Osteomyelitis     HPI      73 y/o M h/o  prostat CA s/p XRT recurrent prostatitis, recurrent UTI,  had cystoscopy found to have necrotic tissue in prostate fossa with stones, CT  demonstrated pubic symphyseal diastasis concerning for osteomyelitis vs osteonecrosis    No fevers over 100.4, has had loss of appetite and lost 15 lbs in past 6 months, no current issues with urination, does wear condom cath for incontinence x at least 3 years    L anterior leg pain started in 2019, ruled out for DVT also had bacteremic Ecoli UTI in 2019  Pain starts anteriorly above bladder and radiates down anterior leg - has had extensive workup for this  cscope was normal 2019    Past Medical History:   Diagnosis Date    Anemia     Aplasia bone marrow     Asthma     CA of prostate     CML (chronic myeloid leukemia)     Coronary artery disease     Heart attack 2014    Heart failure     Hyperlipidemia     Hypertension     Hypothyroidism     Kidney stones     Prostate cancer     Thyroid disease     Urinary incontinence        Social History     Socioeconomic History    Marital status:      Spouse name: Not on file    Number of children: Not on file    Years of education: Not on file    Highest education level: Not on file   Occupational History    Not on file   Social Needs    Financial resource strain: Not on file    Food insecurity:     Worry: Not on file     Inability: Not on file    Transportation needs:     Medical: Not on file     Non-medical: Not on file   Tobacco Use    Smoking status: Former Smoker     Packs/day: 1.00     Years: 25.00     Pack years: 25.00     Types: Cigarettes     Last attempt to quit: 1988     Years since quittin.6    Smokeless tobacco: Never Used   Substance and Sexual Activity    Alcohol use: No    Drug use: No     Sexual activity: Yes     Partners: Female   Lifestyle    Physical activity:     Days per week: Not on file     Minutes per session: Not on file    Stress: Not on file   Relationships    Social connections:     Talks on phone: Not on file     Gets together: Not on file     Attends Yazdanism service: Not on file     Active member of club or organization: Not on file     Attends meetings of clubs or organizations: Not on file     Relationship status: Not on file   Other Topics Concern    Not on file   Social History Narrative    Not on file         Current Outpatient Medications:     ascorbic acid, vitamin C, (VITAMIN C) 100 MG tablet, Take 100 mg by mouth once daily., Disp: , Rfl:     aspirin (ECOTRIN) 81 MG EC tablet, Take 81 mg by mouth once daily., Disp: , Rfl:     b complex vitamins tablet, Take 1 tablet by mouth once daily., Disp: , Rfl:     beta carotene 74384 UNIT capsule, Take 10,000 Units by mouth once daily. , Disp: , Rfl:     cyanocobalamin (VITAMIN B-12) 1000 MCG tablet, Take 1,000 mcg by mouth nightly. , Disp: , Rfl:     ferrous sulfate 325 (65 FE) MG EC tablet, Take 325 mg by mouth once daily., Disp: , Rfl:     fish oil-omega-3 fatty acids 300-1,000 mg capsule, Take by mouth once daily., Disp: , Rfl:     folic acid (FOLVITE) 400 MCG tablet, Take 400 mcg by mouth once daily., Disp: , Rfl:     GLEEVEC 400 mg Tab, 400 mg once daily. , Disp: , Rfl:     isosorbide mononitrate (IMDUR) 30 MG 24 hr tablet, Take 30 mg by mouth once daily., Disp: , Rfl:     levothyroxine (SYNTHROID) 100 MCG tablet, TAKE 1 TABLET EVERY DAY, Disp: 90 tablet, Rfl: 3    losartan (COZAAR) 100 MG tablet, Take 1 tablet (100 mg total) by mouth once daily., Disp: 30 tablet, Rfl: 0    lycopene 10 mg Cap, Take 1 capsule by mouth nightly. , Disp: , Rfl:     NITROSTAT 0.4 mg SL tablet, Place 0.4 mg under the tongue every 5 (five) minutes as needed. , Disp: , Rfl:     PRASTERONE, DHEA, (DHEA ORAL), Take 25 mg by mouth once  daily. 50 mg. tablet , Disp: , Rfl:     RANEXA 1,000 mg Tb12, Take 500 mg by mouth 2 (two) times daily. , Disp: , Rfl:     rosuvastatin (CRESTOR) 10 MG tablet, Take 1 tablet (10 mg total) by mouth every evening., Disp: 30 tablet, Rfl: 0    traMADol (ULTRAM) 50 mg tablet, TAKE 1 TABLET BY MOUTH EVERY 6 HOURS AS NEEDED FOR PAIN, Disp: 60 tablet, Rfl: 0    vitamin D (VITAMIN D3) 1000 units Tab, Take 2,000 Units by mouth once daily., Disp: , Rfl:     catheter (BARD RUBBER UTILITY CATHETER) 14 Fr Misc, 1 Units by Misc.(Non-Drug; Combo Route) route every 7 days. (Patient not taking: Reported on 12/19/2019), Disp: 30 each, Rfl: 3    lidocaine HCL 2% (XYLOCAINE) 2 % jelly, Apply topically as needed. (Patient not taking: Reported on 12/19/2019), Disp: 30 mL, Rfl: 11    meclizine (ANTIVERT) 25 mg tablet, Take 1 tablet (25 mg total) by mouth 3 (three) times daily as needed. (Patient not taking: Reported on 12/19/2019), Disp: 30 tablet, Rfl: 0    Review of Systems   Constitutional: Negative for activity change, appetite change, chills, fatigue and fever.   HENT: Negative for congestion, dental problem, mouth sores and sinus pressure.    Eyes: Negative for pain, redness and visual disturbance.   Respiratory: Negative for cough, shortness of breath and wheezing.    Cardiovascular: Negative for chest pain and leg swelling.   Gastrointestinal: Negative for abdominal distention, abdominal pain, diarrhea, nausea and vomiting.   Endocrine: Negative for polyuria.   Genitourinary: Negative for decreased urine volume, dysuria and frequency.   Musculoskeletal: Negative for joint swelling.   Skin: Negative for color change.   Allergic/Immunologic: Negative for food allergies.   Neurological: Negative for dizziness, weakness and headaches.   Hematological: Negative for adenopathy.   Psychiatric/Behavioral: Negative for agitation and confusion. The patient is not nervous/anxious.            Objective:      Vitals:    12/19/19 1328    BP: 126/70   Pulse: 91   Temp: 98.2 °F (36.8 °C)     Physical Exam   Constitutional: He is oriented to person, place, and time. He appears well-developed and well-nourished.   HENT:   Head: Normocephalic and atraumatic.   Mouth/Throat: Oropharynx is clear and moist.   Eyes: Conjunctivae are normal.   Neck: Neck supple.   Cardiovascular: Normal rate, regular rhythm and normal heart sounds.   No murmur heard.  Pulmonary/Chest: Effort normal and breath sounds normal. No respiratory distress. He has no wheezes.   Abdominal: Soft. Bowel sounds are normal. He exhibits no distension. There is no tenderness.   Musculoskeletal: Normal range of motion. He exhibits no edema or tenderness.   Lymphadenopathy:     He has no cervical adenopathy.   Neurological: He is alert and oriented to person, place, and time. Coordination normal.   Skin: Skin is warm and dry. No rash noted.   Psychiatric: He has a normal mood and affect. His behavior is normal.           Assessment/Plan:       No diagnosis found.      73 y/o M h/o CAD, bradycardia s/p PPM,  prostate CA s/p XRT recurrent prostatitis, recurrent UTI, 11/19 had cystoscopy found to have necrotic tissue in prostate fossa with stones, c/o significant pubic pain radiating down b/l anterior legs (essentially since 5/2019) with workup by  with CT 12/6 demonstrated pubic symphyseal diastasis concerning for osteomyelitis vs osteonecrosis  - unclear if area is infected at this time.  Given overall chronicity, history, and clinical picture would favor non-infectious etiology like osteonecrosis however possibility of collection at that site would be concerning for infection.    - would need some type of biopsy and or culture from this area to establish infectious etiology and cultures to help guide antimicrobial therapy  - will discuss with orthopedic surgery as well best next steps.  May need further imaging to help better define process as well

## 2019-12-19 NOTE — TELEPHONE ENCOUNTER
----- Message from Grace Blankenship sent at 2019 12:16 PM CST -----  Contact: Fax  James Quan Jr.  MRN: [id number]  : 1945  PCP: Alex Mcallister  Home Phone      [phone]  Work Phone      Not on file.  Mobile          [phone]      MESSAGE:   Pt requesting refill or new Rx.   Is this a refill or new RX:  new  RX name and strength: traMADol (ULTRAM) 50 mg tablet  Last office visit: 12/10/19  Is this a 30-day or 90-day RX:  30  Pharm[facility] and [facility]   Comments:      Phone:  [phone]

## 2019-12-19 NOTE — Clinical Note
Hello, I was trying to get this gentleman in to see someone from ortho and Marin Ponce recommended you as best to possibly help here.  Please feel free to call my cell to further discuss if need to.  [phone].  Thanks for your time.

## 2019-12-23 ENCOUNTER — TELEPHONE (OUTPATIENT)
Dept: FAMILY MEDICINE | Facility: CLINIC | Age: 74
End: 2019-12-23

## 2019-12-23 DIAGNOSIS — S32.509A CLOSED FRACTURE OF PUBIS, UNSPECIFIED PORTION OF PUBIS, UNSPECIFIED LATERALITY, INITIAL ENCOUNTER: Primary | ICD-10-CM

## 2019-12-23 NOTE — TELEPHONE ENCOUNTER
He would need to see ortho. I doubt they would recommend much besides walker and PT for this. But they would be the one to evaluate that fracture and recommend a treatment plan. My other concern on the CT scan is that he has some air in his bladder. there should never be air in the bladder. This could potentially be from a fistula caused by an infection of a diverticula communicating with his bladder. Did he see urology after this CT scan? If not, he needs to. Thanks

## 2019-12-23 NOTE — TELEPHONE ENCOUNTER
----- Message from Rula Robins sent at 2019  9:48 AM CST -----  Contact: self  James Quan Jr.  MRN: [id number]  : 1945  PCP: Alex Mcallister  Home Phone      [phone]  Work Phone      Not on file.  Mobile          [phone]      MESSAGE:   Patient would like to talk to a nurse about the pain he been having in his pelvis area.     [phone]

## 2019-12-23 NOTE — TELEPHONE ENCOUNTER
Patient notified of recommendations. Appointment scheduled with Walter Clinton NP at [facility] for 12/27/2019 at 9:00am.    Patient also states his urologist is the one who ordered CT scan.

## 2019-12-23 NOTE — TELEPHONE ENCOUNTER
Patient would like to have  review CT Pelvis from 12/06/2019.    States was ordered per  and then referred to infectious disease provider.   Infectious disease provider states patient pain is coming from broken pubic symphysis.     Would like to know who he would need to see for this. Advise.    States he is currently using a walker, unable to stand on left leg. Remains with pain.     [phone]

## 2019-12-27 ENCOUNTER — OFFICE VISIT (OUTPATIENT)
Dept: ORTHOPEDICS | Facility: CLINIC | Age: 74
End: 2019-12-27
Payer: MEDICARE

## 2019-12-27 VITALS
HEART RATE: 81 BPM | HEIGHT: 66 IN | BODY MASS INDEX: 26.03 KG/M2 | DIASTOLIC BLOOD PRESSURE: 69 MMHG | WEIGHT: 162 LBS | SYSTOLIC BLOOD PRESSURE: 107 MMHG | RESPIRATION RATE: 18 BRPM | TEMPERATURE: 98 F

## 2019-12-27 DIAGNOSIS — S32.509A CLOSED FRACTURE OF PUBIS, UNSPECIFIED PORTION OF PUBIS, UNSPECIFIED LATERALITY, INITIAL ENCOUNTER: Primary | ICD-10-CM

## 2019-12-27 DIAGNOSIS — R93.5 ABNORMAL CT SCAN, PELVIS: ICD-10-CM

## 2019-12-27 PROCEDURE — 3078F DIAST BP <80 MM HG: CPT | Mod: HCNC,CPTII,S$GLB, | Performed by: NURSE PRACTITIONER

## 2019-12-27 PROCEDURE — 3074F PR MOST RECENT SYSTOLIC BLOOD PRESSURE < 130 MM HG: ICD-10-PCS | Mod: HCNC,CPTII,S$GLB, | Performed by: NURSE PRACTITIONER

## 2019-12-27 PROCEDURE — 3078F PR MOST RECENT DIASTOLIC BLOOD PRESSURE < 80 MM HG: ICD-10-PCS | Mod: HCNC,CPTII,S$GLB, | Performed by: NURSE PRACTITIONER

## 2019-12-27 PROCEDURE — 99999 PR PBB SHADOW E&M-EST. PATIENT-LVL V: CPT | Mod: PBBFAC,HCNC,, | Performed by: NURSE PRACTITIONER

## 2019-12-27 PROCEDURE — 3074F SYST BP LT 130 MM HG: CPT | Mod: HCNC,CPTII,S$GLB, | Performed by: NURSE PRACTITIONER

## 2019-12-27 PROCEDURE — 1159F MED LIST DOCD IN RCRD: CPT | Mod: HCNC,S$GLB,, | Performed by: NURSE PRACTITIONER

## 2019-12-27 PROCEDURE — 99499 RISK ADDL DX/OHS AUDIT: ICD-10-PCS | Mod: HCNC,S$GLB,, | Performed by: NURSE PRACTITIONER

## 2019-12-27 PROCEDURE — 1125F PR PAIN SEVERITY QUANTIFIED, PAIN PRESENT: ICD-10-PCS | Mod: HCNC,S$GLB,, | Performed by: NURSE PRACTITIONER

## 2019-12-27 PROCEDURE — 1101F PT FALLS ASSESS-DOCD LE1/YR: CPT | Mod: HCNC,CPTII,S$GLB, | Performed by: NURSE PRACTITIONER

## 2019-12-27 PROCEDURE — 1101F PR PT FALLS ASSESS DOC 0-1 FALLS W/OUT INJ PAST YR: ICD-10-PCS | Mod: HCNC,CPTII,S$GLB, | Performed by: NURSE PRACTITIONER

## 2019-12-27 PROCEDURE — 99999 PR PBB SHADOW E&M-EST. PATIENT-LVL V: ICD-10-PCS | Mod: PBBFAC,HCNC,, | Performed by: NURSE PRACTITIONER

## 2019-12-27 PROCEDURE — 99204 OFFICE O/P NEW MOD 45 MIN: CPT | Mod: HCNC,S$GLB,, | Performed by: NURSE PRACTITIONER

## 2019-12-27 PROCEDURE — 99499 UNLISTED E&M SERVICE: CPT | Mod: HCNC,S$GLB,, | Performed by: NURSE PRACTITIONER

## 2019-12-27 PROCEDURE — 1125F AMNT PAIN NOTED PAIN PRSNT: CPT | Mod: HCNC,S$GLB,, | Performed by: NURSE PRACTITIONER

## 2019-12-27 PROCEDURE — 99204 PR OFFICE/OUTPT VISIT, NEW, LEVL IV, 45-59 MIN: ICD-10-PCS | Mod: HCNC,S$GLB,, | Performed by: NURSE PRACTITIONER

## 2019-12-27 PROCEDURE — 1159F PR MEDICATION LIST DOCUMENTED IN MEDICAL RECORD: ICD-10-PCS | Mod: HCNC,S$GLB,, | Performed by: NURSE PRACTITIONER

## 2019-12-27 NOTE — PROGRESS NOTES
SUBJECTIVE:     Chief Complaint & History of Present Illness:  James Quan Jr. is a New 74 y.o. year old male patient presenting today for intermittent left hip pain which started approximately 7 months (May 2019) ago.  There is not a history of trauma.  The pain is located in the groin aspect of the hip and to supra pubic region.  The pain is described as achy, 5/10.  It is is aggravated by walking.  There is not radiation into the leg.  Previous treatments include tramadol which have provided minimal relief.  There is not a history of previous injury or surgery to the hip.  The patient does not use an assistive device.    In summary, patient with PMH of CML, treated by oncologist in [place], [place] in 2003 who is under the care of Dr. Wells in urology for repeated UTI and suprapubic pain.  He currently has an indwelling adamson in place.  CT of his pelvis was ordered which showed pubic symphyseal diastasis with periarticular sclerosis and lucency and some degree of fragmentation suggestive of osteomyelitis versus Osteonecrosis.  Reports suggestive of infection or fistula could be possible.  Patient was then referred to ID, seen by Dr. Mancilla on 12/19/19, per their note said infectious source unlikely but feels may be osteonecrosis with possible infection at that site could be a possibility.  ID felt biopsy or culture from the area would be helpful to establish an infectious etiology and guide antimicrobial therapy if needed.  Patient was then sent to Ortho.    Currently patient denies fever or chills.  States he has pain to left upper leg that goes into his groin.  States pain worse with walking.    Past Medical History:   Diagnosis Date    Anemia     Aplasia bone marrow     Asthma     CA of prostate     CML (chronic myeloid leukemia)     Coronary artery disease     Heart attack 6/17/2014    Heart failure     Hyperlipidemia     Hypertension     Hypothyroidism     Kidney stones     Prostate  cancer     Thyroid disease     Urinary incontinence        Past Surgical History:   Procedure Laterality Date    APPENDECTOMY  1966    ATRIAL CARDIAC PACEMAKER INSERTION  10/2018    CHOLECYSTECTOMY  1980    CORONARY ARTERY BYPASS GRAFT  1988    CORONARY STENT PLACEMENT  1990    CYSTOSCOPY      heart attack  6/17/2014    PROSTATE SURGERY      TURP    radiation      for ca prostate    TONSILLECTOMY      As child    TRANSURETHRAL RESECTION OF PROSTATE      x3    TRANSURETHRAL RESECTION OF PROSTATE      urethral dilation      VASECTOMY  1979       Family History   Problem Relation Age of Onset    Diabetes Father        Review of patient's allergies indicates:   Allergen Reactions    Niacin preparations Rash    Bactrim [sulfamethoxazole-trimethoprim] Hives and Itching         Current Outpatient Medications:     ascorbic acid, vitamin C, (VITAMIN C) 100 MG tablet, Take 100 mg by mouth once daily., Disp: , Rfl:     aspirin (ECOTRIN) 81 MG EC tablet, Take 81 mg by mouth once daily., Disp: , Rfl:     b complex vitamins tablet, Take 1 tablet by mouth once daily., Disp: , Rfl:     beta carotene 98855 UNIT capsule, Take 10,000 Units by mouth once daily. , Disp: , Rfl:     catheter (BARD RUBBER UTILITY CATHETER) 14 Fr Misc, 1 Units by Misc.(Non-Drug; Combo Route) route every 7 days., Disp: 30 each, Rfl: 3    cyanocobalamin (VITAMIN B-12) 1000 MCG tablet, Take 1,000 mcg by mouth nightly. , Disp: , Rfl:     ferrous sulfate 325 (65 FE) MG EC tablet, Take 325 mg by mouth once daily., Disp: , Rfl:     fish oil-omega-3 fatty acids 300-1,000 mg capsule, Take by mouth once daily., Disp: , Rfl:     folic acid (FOLVITE) 400 MCG tablet, Take 400 mcg by mouth once daily., Disp: , Rfl:     GLEEVEC 400 mg Tab, 400 mg once daily. , Disp: , Rfl:     isosorbide mononitrate (IMDUR) 30 MG 24 hr tablet, Take 30 mg by mouth once daily., Disp: , Rfl:     levothyroxine (SYNTHROID) 100 MCG tablet, TAKE 1 TABLET EVERY DAY,  "Disp: 90 tablet, Rfl: 3    lidocaine HCL 2% (XYLOCAINE) 2 % jelly, Apply topically as needed., Disp: 30 mL, Rfl: 11    losartan (COZAAR) 100 MG tablet, Take 1 tablet (100 mg total) by mouth once daily., Disp: 30 tablet, Rfl: 0    lycopene 10 mg Cap, Take 1 capsule by mouth nightly. , Disp: , Rfl:     meclizine (ANTIVERT) 25 mg tablet, Take 1 tablet (25 mg total) by mouth 3 (three) times daily as needed., Disp: 30 tablet, Rfl: 0    NITROSTAT 0.4 mg SL tablet, Place 0.4 mg under the tongue every 5 (five) minutes as needed. , Disp: , Rfl:     PRASTERONE, DHEA, (DHEA ORAL), Take 25 mg by mouth once daily. 50 mg. tablet , Disp: , Rfl:     RANEXA 1,000 mg Tb12, Take 500 mg by mouth 2 (two) times daily. , Disp: , Rfl:     rosuvastatin (CRESTOR) 10 MG tablet, Take 1 tablet (10 mg total) by mouth every evening., Disp: 30 tablet, Rfl: 0    traMADol (ULTRAM) 50 mg tablet, TAKE 1 TABLET BY MOUTH EVERY 6 HOURS AS NEEDED FOR PAIN, Disp: 60 tablet, Rfl: 0    vitamin D (VITAMIN D3) 1000 units Tab, Take 2,000 Units by mouth once daily., Disp: , Rfl:     Review of Systems:  ROS:  Constitutional: no fever or chills  Eyes: no visual changes  ENT: no nasal congestion or sore throat  Respiratory: no cough or shortness of breath  Cardiovascular: no chest pain or palpitations  Gastrointestinal: no nausea or vomiting, tolerating diet  Genitourinary: no hematuria or dysuria  Integument/Breast: no rash or pruritis  Hematologic/Lymphatic: no easy bruising or lymphadenopathy  Musculoskeletal: positive for arthralgias  Neurological: no seizures or tremors  Behavioral/Psych: no auditory or visual hallucinations  Endocrine: no heat or cold intolerance      PE:  /69   Pulse 81   Temp 97.5 °F (36.4 °C) (Oral)   Resp 18   Ht 5' 6" (1.676 m)   Wt 73.5 kg (162 lb)   BMI 26.15 kg/m²   General: Pleasant, cooperative, NAD   HEENT: NCAT, sclera nonicteric   Lungs: Respirations are equal and unlabored.   Abdomen: Soft and " non-tender.  CV: 2+ bilateral upper and lower extremity pulses.   Skin: Intact throughout LE with no rashes, erythema, or lesions  Extremities: No LE edema, NVI lower extremities      Hip Exam:   leftpositives: decreased flexion and pain with rotation and negatives: no pain with heel impact  pulses full    80 degrees flexion  0 degrees extension   40 degrees internal rotation  25 degrees external rotation  10 degrees abduction  10 degrees adduction       RADIOGRAPHS:  None today  Previous CT of pelvis report reviewed.    ASSESSMENT/PLAN:       ICD-10-CM ICD-9-CM   1. Closed fracture of pubis, unspecified portion of pubis, unspecified laterality, initial encounter S32.509A 808.2       Plan: We discussed with the patient at length all the different treatment options available for arthrosis of the hip including anti-inflammatories, acetaminophen, rest, ice, lower extremity strengthening exercise, occasional cortisone injections for temporary relief, and finally total hip arthroplasty.     -Patient presents for evaluation of his pain to left leg and pubic region.  He states he is followed by Dr. Wells for repeated UTI, known history of CML and Prostate CA s/p XRT in 2003.  Underwent CT of Pelvis - report indicates possible osteomyelitis versus osteonecrosis in his pubic symphseal diastasis area.  Patient was then sent to ID for evaluation who felt infectious source unlikely but could not rule out and recommended referral to Ortho for consideration of bone biopsy or culture of the area.  -Discussed findings of CT and recommendations from ID with Dr. Hall.  He thinks further testing should be done to rule out infectious source and has suggested a MRI of the pelvis with and without contrast.  If MRI is negative, then infection likely low yield.  If MRI suggestive of infection would then proceed with NM Prosthesis scan to determine.  -Patient advised of above recommendations.  MRI ordered.  He does have a Pacemaker which  is MRI compatible and therefore will have to coordinate MRI with them.  -Will call patient with MRI results once obtained.

## 2019-12-27 NOTE — TELEPHONE ENCOUNTER
LOV: 12/10/2019    Patient requesting refill for:    1.) Rosuvastatin    Pharmacy: [facility]

## 2019-12-27 NOTE — LETTER
December 28, 2019      Alex Mcallister MD  [address]  [facility] [address]           [facility]  [address]  [facility] [address]  Phone: [phone]          Patient: James Quan Jr.   MR Number: [id number]   YOB: 1945   Date of Visit: 12/27/2019       Dear Dr. Alex Mcallister:    Thank you for referring James Quan to me for evaluation. Attached you will find relevant portions of my assessment and plan of care.    If you have questions, please do not hesitate to call me. I look forward to following James Quan along with you.    Sincerely,    Walter Clinton NP    Enclosure  CC:  No Recipients    If you would like to receive this communication electronically, please contact [email or web address] or [phone] to request more information on [employer] access.    For providers and/or their staff who would like to refer a patient to [facility], please contact us through our one-stop-shop provider referral line, [facility], at [phone].    If you feel you have received this communication in error or would no longer like to receive these types of communications, please e-mail [email or web address]

## 2019-12-28 RX ORDER — ROSUVASTATIN CALCIUM 10 MG/1
10 TABLET, COATED ORAL NIGHTLY
Qty: 30 TABLET | Refills: 5 | Status: SHIPPED | OUTPATIENT
Start: 2019-12-28 | End: 2020-04-20 | Stop reason: SDUPTHER

## 2019-12-30 ENCOUNTER — TELEPHONE (OUTPATIENT)
Dept: ORTHOPEDICS | Facility: CLINIC | Age: 74
End: 2019-12-30

## 2019-12-30 NOTE — TELEPHONE ENCOUNTER
----- Message from Afsaneh Ortiz LPN sent at 12/30/2019  2:45 PM CST -----  Contact: [phone]      ----- Message -----  From: Liz Cantrell  Sent: 12/30/2019   1:54 PM CST  To: [facility] Staff    Pt states when will his Mri be done ? Please call back to discuss

## 2019-12-30 NOTE — TELEPHONE ENCOUNTER
Spoke with pt.   Advised that MRI will research his pacemaker info and contact him regarding his ability to have a MRI    Pt verbalized understanding

## 2020-01-07 ENCOUNTER — DOCUMENTATION ONLY (OUTPATIENT)
Dept: CARDIOLOGY | Facility: HOSPITAL | Age: 75
End: 2020-01-07

## 2020-01-07 ENCOUNTER — PES CALL (OUTPATIENT)
Dept: ADMINISTRATIVE | Facility: CLINIC | Age: 75
End: 2020-01-07

## 2020-01-07 DIAGNOSIS — S32.509A CLOSED FRACTURE OF PUBIS, UNSPECIFIED PORTION OF PUBIS, UNSPECIFIED LATERALITY, INITIAL ENCOUNTER: Primary | ICD-10-CM

## 2020-01-07 DIAGNOSIS — Z95.0 HISTORY OF PACEMAKER: ICD-10-CM

## 2020-01-07 NOTE — PROGRESS NOTES
Pt to have MRI on 1/14/2020 @ 2:00 pm.  Pt has a [employer] pacemaker. MRI compatibility documented in pt's chart. Appt date and time was given to Dulce with [employer].

## 2020-01-07 NOTE — PROGRESS NOTES
Received a call from the MRI Department in relation to this patient needing to be scheduled for a MRI and has a Medtronic Pacemaker.  Please see information below as it relates to patient's Device being MRI compatible/conditional.   MRI informed ordering MD must input a Cardiac Device Check in clinic and hospital order as well as patient must have an xray within 6 months or less prior to MRI reprogramming.       Pt has  Erica MRI SureScan  [id number] Pacemaker with the following leads.    CapSure Sense MRI SureScan  4074  58   4574  53   This pacing system is listed on [employer] MR-Conditional Product Listing.

## 2020-01-09 ENCOUNTER — OFFICE VISIT (OUTPATIENT)
Dept: FAMILY MEDICINE | Facility: CLINIC | Age: 75
End: 2020-01-09
Payer: MEDICARE

## 2020-01-09 VITALS
BODY MASS INDEX: 26.29 KG/M2 | HEART RATE: 66 BPM | DIASTOLIC BLOOD PRESSURE: 68 MMHG | WEIGHT: 163.56 LBS | SYSTOLIC BLOOD PRESSURE: 118 MMHG | HEIGHT: 66 IN | RESPIRATION RATE: 16 BRPM

## 2020-01-09 DIAGNOSIS — L72.3 SEBACEOUS CYST: Primary | ICD-10-CM

## 2020-01-09 PROCEDURE — 99999 PR PBB SHADOW E&M-EST. PATIENT-LVL IV: ICD-10-PCS | Mod: PBBFAC,HCNC,, | Performed by: FAMILY MEDICINE

## 2020-01-09 PROCEDURE — 3074F SYST BP LT 130 MM HG: CPT | Mod: HCNC,CPTII,S$GLB, | Performed by: FAMILY MEDICINE

## 2020-01-09 PROCEDURE — 99212 OFFICE O/P EST SF 10 MIN: CPT | Mod: HCNC,S$GLB,, | Performed by: FAMILY MEDICINE

## 2020-01-09 PROCEDURE — 1101F PT FALLS ASSESS-DOCD LE1/YR: CPT | Mod: HCNC,CPTII,S$GLB, | Performed by: FAMILY MEDICINE

## 2020-01-09 PROCEDURE — 3074F PR MOST RECENT SYSTOLIC BLOOD PRESSURE < 130 MM HG: ICD-10-PCS | Mod: HCNC,CPTII,S$GLB, | Performed by: FAMILY MEDICINE

## 2020-01-09 PROCEDURE — 1159F PR MEDICATION LIST DOCUMENTED IN MEDICAL RECORD: ICD-10-PCS | Mod: HCNC,S$GLB,, | Performed by: FAMILY MEDICINE

## 2020-01-09 PROCEDURE — 99999 PR PBB SHADOW E&M-EST. PATIENT-LVL IV: CPT | Mod: PBBFAC,HCNC,, | Performed by: FAMILY MEDICINE

## 2020-01-09 PROCEDURE — 3078F PR MOST RECENT DIASTOLIC BLOOD PRESSURE < 80 MM HG: ICD-10-PCS | Mod: HCNC,CPTII,S$GLB, | Performed by: FAMILY MEDICINE

## 2020-01-09 PROCEDURE — 99212 PR OFFICE/OUTPT VISIT, EST, LEVL II, 10-19 MIN: ICD-10-PCS | Mod: HCNC,S$GLB,, | Performed by: FAMILY MEDICINE

## 2020-01-09 PROCEDURE — 1125F AMNT PAIN NOTED PAIN PRSNT: CPT | Mod: HCNC,S$GLB,, | Performed by: FAMILY MEDICINE

## 2020-01-09 PROCEDURE — 1101F PR PT FALLS ASSESS DOC 0-1 FALLS W/OUT INJ PAST YR: ICD-10-PCS | Mod: HCNC,CPTII,S$GLB, | Performed by: FAMILY MEDICINE

## 2020-01-09 PROCEDURE — 1125F PR PAIN SEVERITY QUANTIFIED, PAIN PRESENT: ICD-10-PCS | Mod: HCNC,S$GLB,, | Performed by: FAMILY MEDICINE

## 2020-01-09 PROCEDURE — 1159F MED LIST DOCD IN RCRD: CPT | Mod: HCNC,S$GLB,, | Performed by: FAMILY MEDICINE

## 2020-01-09 PROCEDURE — 3078F DIAST BP <80 MM HG: CPT | Mod: HCNC,CPTII,S$GLB, | Performed by: FAMILY MEDICINE

## 2020-01-10 NOTE — PROGRESS NOTES
Subjective:       Patient ID: James Quan Jr. is a 74 y.o. male.    Chief Complaint: bump on neck    Pt is a 74 y.o. male who presents for evaluation and management of   Encounter Diagnosis   Name Primary?    Sebaceous cyst Yes   .posterior neck  Painful   Not sure what it is     Doing well on current meds. Denies any side effects. Prevention is up to date.  Review of Systems   Constitutional: Negative for fever.       Objective:      Physical Exam   Constitutional: He is oriented to person, place, and time. He appears well-developed and well-nourished.   HENT:   Head: Normocephalic and atraumatic.   Eyes: Conjunctivae are normal.   Neck: Normal range of motion. Neck supple.   Posterior neck with 1cm subcutaneous induration c/w seb cyst   No erythema  No discharge      Cardiovascular: Normal rate, regular rhythm and normal heart sounds. Exam reveals no gallop.   No murmur heard.  Pulmonary/Chest: Effort normal and breath sounds normal.   Musculoskeletal: Normal range of motion.   Neurological: He is alert and oriented to person, place, and time.   Skin: Skin is warm and dry.   Psychiatric: He has a normal mood and affect. His behavior is normal.       Assessment:       1. Sebaceous cyst        Plan:   James was seen today for bump on neck.    Diagnoses and all orders for this visit:    Sebaceous cyst      Problem List Items Addressed This Visit     None      Visit Diagnoses     Sebaceous cyst    -  Primary        Offered removal electively. He opts to observe instead     No follow-ups on file.

## 2020-01-14 ENCOUNTER — TELEPHONE (OUTPATIENT)
Dept: CARDIOLOGY | Facility: HOSPITAL | Age: 75
End: 2020-01-14

## 2020-01-14 ENCOUNTER — HOSPITAL ENCOUNTER (OUTPATIENT)
Dept: RADIOLOGY | Facility: HOSPITAL | Age: 75
Discharge: HOME | End: 2020-01-14
Attending: NURSE PRACTITIONER
Payer: MEDICARE

## 2020-01-14 ENCOUNTER — DOCUMENTATION ONLY (OUTPATIENT)
Dept: CARDIOLOGY | Facility: HOSPITAL | Age: 75
End: 2020-01-14

## 2020-01-14 VITALS — HEART RATE: 100 BPM

## 2020-01-14 DIAGNOSIS — S32.509A CLOSED FRACTURE OF PUBIS, UNSPECIFIED PORTION OF PUBIS, UNSPECIFIED LATERALITY, INITIAL ENCOUNTER: ICD-10-CM

## 2020-01-14 DIAGNOSIS — R93.5 ABNORMAL CT SCAN, PELVIS: ICD-10-CM

## 2020-01-14 PROCEDURE — A9585 GADOBUTROL INJECTION: HCPCS | Mod: HCNC | Performed by: NURSE PRACTITIONER

## 2020-01-14 PROCEDURE — 72197 MRI PELVIS W/O & W/DYE: CPT | Mod: TC,HCNC

## 2020-01-14 PROCEDURE — 25500020 PHARM REV CODE 255: Mod: HCNC | Performed by: NURSE PRACTITIONER

## 2020-01-14 PROCEDURE — 72197 MRI PELVIS W WO CONTRAST: ICD-10-PCS | Mod: 26,HCNC,, | Performed by: RADIOLOGY

## 2020-01-14 PROCEDURE — 72197 MRI PELVIS W/O & W/DYE: CPT | Mod: 26,HCNC,, | Performed by: RADIOLOGY

## 2020-01-14 RX ORDER — GADOBUTROL 604.72 MG/ML
8 INJECTION INTRAVENOUS
Status: COMPLETED | OUTPATIENT
Start: 2020-01-14 | End: 2020-01-14

## 2020-01-14 RX ADMIN — GADOBUTROL 8 ML: 604.72 INJECTION INTRAVENOUS at 03:01

## 2020-01-14 NOTE — PROGRESS NOTES
An MRI has been ordered on this patient, an IMPLANTED CARDIAC DEVICE is present.    The patient's medical record has been reviewed for the following:    -the MRI conditional system has been implanted for a minimum of 6 weeks and is considered post the lead maturation period    -there are no known lead extenders, lead adaptors, or abandoned leads in place    -there are no known broken leads or leads with intermittent electrical contact as confirmed by the lead impedance history    -pacing capture thresholds are < 2.0 volts (V) at a pulse width of 0.40 milliseconds (ms)    -PACEMAKERS ONLY:  measured pacing lead impedances are >/= 200 Ohms and </= 1500 Ohms    -ICD'S ONLY:  measured pacing lead impedance value of >/= 200 Ohms and </= 3,000 Ohms; ICD shock lead impedances between 20 and 200 Ohms    -there is no noted diaphragmatic stimulation at a pacing output of 5.0V and at a pulse width of 1.0ms in patients whose device will be programmed to an asynchronous pacing mode    Parameters were reprogrammed to MRI safe mode as per the manufacturers scanning guidelines; post scan, parameters were returned to initial settings.      Lead testing was repeated post scan to ensure proper function and safety margins.    Device reprogramming to be performed by industry rep to turn on MRI safe settings; parameters will be changed to DOO (VOO for AF) + 10 beats above instrinsic rate.

## 2020-01-14 NOTE — PROGRESS NOTES
Cardiac device adjusted for MRI and pt ambulatory to table and placed to monitor / AAO w/ NAD noted

## 2020-01-14 NOTE — PROGRESS NOTES
MRI done pt tolerated well and ambulatory with walker to Zone #2 for rep to adjust cardiac device to normal mode

## 2020-01-15 ENCOUNTER — TELEPHONE (OUTPATIENT)
Dept: ORTHOPEDICS | Facility: CLINIC | Age: 75
End: 2020-01-15

## 2020-01-15 NOTE — TELEPHONE ENCOUNTER
Called patient with results of his pelvis MRI.  Advised I had discussed findings with Dr. Hall who said he had discussed with urology who would plan to wash him out.  Patient notified.

## 2020-01-16 ENCOUNTER — TELEPHONE (OUTPATIENT)
Dept: UROLOGY | Facility: CLINIC | Age: 75
End: 2020-01-16

## 2020-01-16 NOTE — TELEPHONE ENCOUNTER
----- Message from Walter Clinton NP sent at 1/15/2020  3:24 PM CST -----  Regarding: Pelvic MRI  Dr. Wells,    I had seen this patient in Orthopedics due to an abnormal CT of his pelvis.  I had discussed his case with Dr. Hall who had recommended a MRI.  MRI findings shows pubosymphyseal-urethral fistula.  Dr. Hall had informed me that urology was planning to wash him out.  Wanted to touch base as I don't see he has a follow up with urology.    Please advise.    Walter

## 2020-01-16 NOTE — TELEPHONE ENCOUNTER
Janice,   Please have him come and see me to discuss osteomyelitis and suspected fistula involving this prostate.    MRI of pelvis  1. 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  2. Insufficiency fracture of the right sacral ala.  3. Myositis of the left greater than right obturator and adductor musculature.  This report was flagged in Epic as abnormal.

## 2020-01-17 DIAGNOSIS — M86.9 OSTEOMYELITIS OF SYMPHYSIS PUBIS: Primary | ICD-10-CM

## 2020-01-17 NOTE — PROGRESS NOTES
MRI pelvis 1/14/20    1. 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  2. Insufficiency fracture of the right sacral ala.  3. Myositis of the left greater than right obturator and adductor musculature.    I am going to see him on 1/2120.  I have not done any urologic procedure on this pt yet.  I will plan to intra-op retrograde urethrogram to r/o urethral fistula, cysto evaluation with possible fulguration, possible urethral adamson catheter or suprapubic catheter placement to diver his urine until his osteomyelitis is treated.  Please refer him to Infectious disease and Orthopedics again for further management.

## 2020-01-21 ENCOUNTER — OFFICE VISIT (OUTPATIENT)
Dept: UROLOGY | Facility: CLINIC | Age: 75
End: 2020-01-21
Payer: MEDICARE

## 2020-01-21 ENCOUNTER — TELEPHONE (OUTPATIENT)
Dept: UROLOGY | Facility: CLINIC | Age: 75
End: 2020-01-21

## 2020-01-21 ENCOUNTER — TELEPHONE (OUTPATIENT)
Dept: INFECTIOUS DISEASES | Facility: CLINIC | Age: 75
End: 2020-01-21

## 2020-01-21 VITALS
WEIGHT: 157 LBS | SYSTOLIC BLOOD PRESSURE: 119 MMHG | HEIGHT: 66 IN | DIASTOLIC BLOOD PRESSURE: 68 MMHG | HEART RATE: 56 BPM | BODY MASS INDEX: 25.23 KG/M2

## 2020-01-21 DIAGNOSIS — C61 CA PROSTATE, ADENOCA: ICD-10-CM

## 2020-01-21 DIAGNOSIS — N30.00 ACUTE CYSTITIS WITHOUT HEMATURIA: Primary | ICD-10-CM

## 2020-01-21 DIAGNOSIS — M86.9 OSTEOMYELITIS OF SYMPHYSIS PUBIS: ICD-10-CM

## 2020-01-21 DIAGNOSIS — M86.9 OSTEOMYELITIS OF SYMPHYSIS PUBIS: Primary | ICD-10-CM

## 2020-01-21 DIAGNOSIS — N41.0 ACUTE PROSTATITIS: ICD-10-CM

## 2020-01-21 DIAGNOSIS — C92.10 CML (CHRONIC MYELOCYTIC LEUKEMIA): ICD-10-CM

## 2020-01-21 PROCEDURE — 87077 CULTURE AEROBIC IDENTIFY: CPT | Mod: HCNC

## 2020-01-21 PROCEDURE — 51702 INSERT TEMP BLADDER CATH: CPT | Mod: HCNC,S$GLB,, | Performed by: UROLOGY

## 2020-01-21 PROCEDURE — 1101F PR PT FALLS ASSESS DOC 0-1 FALLS W/OUT INJ PAST YR: ICD-10-PCS | Mod: HCNC,CPTII,S$GLB, | Performed by: UROLOGY

## 2020-01-21 PROCEDURE — 1125F PR PAIN SEVERITY QUANTIFIED, PAIN PRESENT: ICD-10-PCS | Mod: HCNC,S$GLB,, | Performed by: UROLOGY

## 2020-01-21 PROCEDURE — 99499 UNLISTED E&M SERVICE: CPT | Mod: HCNC,S$GLB,, | Performed by: UROLOGY

## 2020-01-21 PROCEDURE — 1159F MED LIST DOCD IN RCRD: CPT | Mod: HCNC,S$GLB,, | Performed by: UROLOGY

## 2020-01-21 PROCEDURE — 87088 URINE BACTERIA CULTURE: CPT | Mod: HCNC

## 2020-01-21 PROCEDURE — 99215 PR OFFICE/OUTPT VISIT, EST, LEVL V, 40-54 MIN: ICD-10-PCS | Mod: 57,25,HCNC,S$GLB | Performed by: UROLOGY

## 2020-01-21 PROCEDURE — 87186 SC STD MICRODIL/AGAR DIL: CPT | Mod: HCNC

## 2020-01-21 PROCEDURE — 99999 PR PBB SHADOW E&M-EST. PATIENT-LVL III: ICD-10-PCS | Mod: PBBFAC,HCNC,, | Performed by: UROLOGY

## 2020-01-21 PROCEDURE — 99215 OFFICE O/P EST HI 40 MIN: CPT | Mod: 57,25,HCNC,S$GLB | Performed by: UROLOGY

## 2020-01-21 PROCEDURE — 3074F PR MOST RECENT SYSTOLIC BLOOD PRESSURE < 130 MM HG: ICD-10-PCS | Mod: HCNC,CPTII,S$GLB, | Performed by: UROLOGY

## 2020-01-21 PROCEDURE — 1159F PR MEDICATION LIST DOCUMENTED IN MEDICAL RECORD: ICD-10-PCS | Mod: HCNC,S$GLB,, | Performed by: UROLOGY

## 2020-01-21 PROCEDURE — 1125F AMNT PAIN NOTED PAIN PRSNT: CPT | Mod: HCNC,S$GLB,, | Performed by: UROLOGY

## 2020-01-21 PROCEDURE — 3074F SYST BP LT 130 MM HG: CPT | Mod: HCNC,CPTII,S$GLB, | Performed by: UROLOGY

## 2020-01-21 PROCEDURE — 87086 URINE CULTURE/COLONY COUNT: CPT | Mod: HCNC

## 2020-01-21 PROCEDURE — 99999 PR PBB SHADOW E&M-EST. PATIENT-LVL III: CPT | Mod: PBBFAC,HCNC,, | Performed by: UROLOGY

## 2020-01-21 PROCEDURE — 51702 PR INSERTION OF TEMPORARY INDWELLING BLADDER CATHETER, SIMPLE: ICD-10-PCS | Mod: HCNC,S$GLB,, | Performed by: UROLOGY

## 2020-01-21 PROCEDURE — 3078F DIAST BP <80 MM HG: CPT | Mod: HCNC,CPTII,S$GLB, | Performed by: UROLOGY

## 2020-01-21 PROCEDURE — 3078F PR MOST RECENT DIASTOLIC BLOOD PRESSURE < 80 MM HG: ICD-10-PCS | Mod: HCNC,CPTII,S$GLB, | Performed by: UROLOGY

## 2020-01-21 PROCEDURE — 1101F PT FALLS ASSESS-DOCD LE1/YR: CPT | Mod: HCNC,CPTII,S$GLB, | Performed by: UROLOGY

## 2020-01-21 PROCEDURE — 99499 RISK ADDL DX/OHS AUDIT: ICD-10-PCS | Mod: HCNC,S$GLB,, | Performed by: UROLOGY

## 2020-01-21 NOTE — TELEPHONE ENCOUNTER
MRI 1/14 read with septic arthritis and osteomyelitis of pubic symphysis and pubosymphyseal-urethral fistula as well as myositis of L and R obturator and adductor musculature.  Discussed with patient will need to see culture results to help guide therapy

## 2020-01-21 NOTE — H&P (VIEW-ONLY)
CC: suprapubic area pain, left groin pain, osteomyelitis of pubic symphysis    James Quan Jr. is a 74 y.o. man who is here for the evaluation of recurrent UTI, suprapubic pain.  C/o suprapubic and left groin pain.  Has been wearing a condom catheter due to constant urinary incontinence.  Was seen by ID and Ortho regarding osteomyelitis of pubic symphasis.  He is here for a follow up.    He was evaluated by me on 11/7/19 with cysto.  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   Sphincter: competent.  Prostate: Estimated Length Prostatic Urethra: s/p TURP with open channel.  But possible necrotic tissues in the prostate fossa with a few stones stuck to this necrotic tissues. No obstruction seen.    Bladder neck: patent with no stricture  Bladder:  Normal bladder.   Normal ureteral orifices bilaterally.   Moderate trabeculation.    He is here today with CT of pelvis.  Pt reports that he no longer has dysuria or problems with urination.  However, still has suprapubic and left groin pain.     a diagnosis of adenocarcinoma the prostate having been made in 2002.  The patient underwent external beam radiation therapy.the patient is also status post TURP.  The patient is incontinent but does urinate on his own periodically.  The patient has documented urethral stricture disease and catheterizes himself once a week.  He states that he meets minimal resistance.  Pt has seen me in the past (2014) pt has hx prostate cancer, stricture and xrt. Pt also has  Incontinence. Dr zhang would like pt to see dr wells tomorrow if possible.  Pt is complaining of pain involving the lower abdomen and groin area.  Has been on Vantin for 1 wk and took Azo for dysuria.  Overall his pain is better.    Denies flank pain,  hematuria.      Past Medical History:   Diagnosis Date    Anemia     Aplasia bone marrow     Asthma     CA of prostate     CML (chronic myeloid leukemia)     Coronary artery disease     Heart attack 2014    Heart failure     Hyperlipidemia     Hypertension     Hypothyroidism     Kidney stones     Prostate cancer     Thyroid disease     Urinary incontinence      Past Surgical History:   Procedure Laterality Date    APPENDECTOMY  1966    ATRIAL CARDIAC PACEMAKER INSERTION  10/2018    CHOLECYSTECTOMY  1980    CORONARY ARTERY BYPASS GRAFT  1988    CORONARY STENT PLACEMENT  1990    CYSTOSCOPY      heart attack  2014    PROSTATE SURGERY      TURP    radiation      for ca prostate    TONSILLECTOMY      As child    TRANSURETHRAL RESECTION OF PROSTATE      x3    TRANSURETHRAL RESECTION OF PROSTATE      urethral dilation      VASECTOMY  1979     Social History     Tobacco Use    Smoking status: Former Smoker     Packs/day: 1.00     Years: 25.00     Pack years: 25.00     Types: Cigarettes     Last attempt to quit: 1988     Years since quittin.7    Smokeless tobacco: Never Used   Substance Use Topics    Alcohol use: No    Drug use: No     Family History   Problem Relation Age of Onset    Diabetes Father      Allergy:  Review of patient's allergies indicates:   Allergen Reactions    Niacin preparations Rash    Bactrim [sulfamethoxazole-trimethoprim] Hives and Itching     Outpatient Encounter Medications as of 2020   Medication Sig Dispense Refill    ascorbic acid, vitamin C, (VITAMIN C) 100 MG tablet Take 100 mg by mouth once daily.      aspirin (ECOTRIN) 81 MG EC tablet Take 81 mg by mouth once daily.      b complex vitamins tablet Take 1 tablet by mouth once daily.      beta carotene 86170 UNIT capsule Take 10,000 Units by mouth once daily.       catheter (BARD RUBBER UTILITY CATHETER) 14 Fr Misc 1 Units by Misc.(Non-Drug; Combo Route) route every 7 days. 30  each 3    cyanocobalamin (VITAMIN B-12) 1000 MCG tablet Take 1,000 mcg by mouth nightly.       ferrous sulfate 325 (65 FE) MG EC tablet Take 325 mg by mouth once daily.      fish oil-omega-3 fatty acids 300-1,000 mg capsule Take by mouth once daily.      folic acid (FOLVITE) 400 MCG tablet Take 400 mcg by mouth once daily.      GLEEVEC 400 mg Tab 400 mg once daily.       isosorbide mononitrate (IMDUR) 30 MG 24 hr tablet Take 30 mg by mouth once daily.      levothyroxine (SYNTHROID) 100 MCG tablet TAKE 1 TABLET EVERY DAY 90 tablet 3    lidocaine HCL 2% (XYLOCAINE) 2 % jelly Apply topically as needed. 30 mL 11    losartan (COZAAR) 100 MG tablet Take 1 tablet (100 mg total) by mouth once daily. 30 tablet 0    lycopene 10 mg Cap Take 1 capsule by mouth nightly.       meclizine (ANTIVERT) 25 mg tablet Take 1 tablet (25 mg total) by mouth 3 (three) times daily as needed. 30 tablet 0    NITROSTAT 0.4 mg SL tablet Place 0.4 mg under the tongue every 5 (five) minutes as needed.       PRASTERONE, DHEA, (DHEA ORAL) Take 25 mg by mouth once daily. 50 mg. tablet       RANEXA 1,000 mg Tb12 Take 500 mg by mouth 2 (two) times daily.       rosuvastatin (CRESTOR) 10 MG tablet Take 1 tablet (10 mg total) by mouth every evening. 30 tablet 5    traMADol (ULTRAM) 50 mg tablet TAKE 1 TABLET BY MOUTH EVERY 6 HOURS AS NEEDED FOR PAIN 60 tablet 0    vitamin D (VITAMIN D3) 1000 units Tab Take 2,000 Units by mouth once daily.       No facility-administered encounter medications on file as of 1/21/2020.      Review of Systems   ROS  Physical Exam     Vitals:    01/21/20 0804   BP: 119/68   Pulse: (!) 56     Physical Exam   Constitutional: He is oriented to person, place, and time. He appears well-developed and well-nourished. No distress.   HENT:   Head: Normocephalic and atraumatic.   Right Ear: External ear normal.   Left Ear: External ear normal.   Nose: Nose normal.   Mouth/Throat: Oropharynx is clear and moist.   Eyes:  Conjunctivae are normal. Pupils are equal, round, and reactive to light.   Neck: Normal range of motion. Neck supple. No JVD present. No tracheal deviation present. No thyromegaly present.   Cardiovascular: Normal rate, regular rhythm, normal heart sounds and intact distal pulses.  Exam reveals no gallop and no friction rub.    No murmur heard.  Pulmonary/Chest: Effort normal and breath sounds normal. No respiratory distress. He has no wheezes. He exhibits no tenderness.   Abdominal: Soft. Bowel sounds are normal. He exhibits no distension and no mass. There is no tenderness. There is no rebound and no guarding.   Genitourinary: Rectum normal and penis normal. No penile tenderness.   Genitourinary Comments: Prostate 40 grams with negative nodule or positive tenderness  Minimal EPS.  Under microscopic exam: no significant WBCs noted but suspicious GNR?   Musculoskeletal: Normal range of motion. He exhibits no edema, tenderness or deformity.   Lymphadenopathy:     He has no cervical adenopathy.   Neurological: He is alert and oriented to person, place, and time.   Skin: Skin is warm and dry. He is not diaphoretic.     Psychiatric: He has a normal mood and affect. His behavior is normal. Thought content normal.     Genitalia:  Scrotum: no rash or lesion  Normal symmetric epididymis without masses  Normal vas palpated  Normal size, symmetric testicles with no masses   Normal urethral meatus with no discharge  Normal circumcised penis with no lesion   Rectal:  Normal perineum and anus upon inspection.  Normal tone, no masses or tenderness;     LABS:  Lab Results   Component Value Date    PSA 0.37 04/23/2014    PSADIAG 0.44 12/06/2019    PSADIAG 0.82 11/07/2016    PSADIAG 0.50 06/19/2015     Results for orders placed or performed in visit on 12/06/19   Prostate Specific Antigen, Diagnostic   Result Value Ref Range    PSA DIAGNOSTIC 0.44 0.00 - 4.00 ng/mL   Results for orders placed or performed in visit on 11/07/16    Prostate Specific Antigen, Diagnostic   Result Value Ref Range    PSA DIAGNOSTIC 0.82 0.00 - 4.00 ng/mL   Results for orders placed or performed in visit on 06/19/15   Prostate Specific Antigen, Diagnostic   Result Value Ref Range    PSA DIAGNOSTIC 0.50 0.00 - 4.00 ng/mL     Lab Results   Component Value Date    CREATININE 1.4 12/06/2019    CREATININE 1.1 10/08/2019    CREATININE 1.3 10/07/2019     No results found for this or any previous visit.  Urine Culture, Routine   Date Value Ref Range Status   11/19/2019   Final    Multiple organisms isolated. None in predominance.  Repeat if   11/19/2019 clinically necessary.  Final     No results found for: HGBA1C  CT pelvis 12/6/19  There is new pubic symphyseal diastasis with periarticular sclerosis and lucency and some degree of fragmentation.  Whether this relates to contiguous osteomyelitis or osteonecrosis is uncertain in this patient with a history of prostate radiotherapy.  Inferior and posterior to the symphysis there is a small low-density area with small calcifications and minimal gas.  Whether this relates to infection or fistula is uncertain.    There's a small amount of air in the bladder in there's some bladder wall thickening    Degenerative changes are seen in the spine and hips and fat containing inguinal hernia formation is noted bilaterally.    MRI 1/14/20  1. 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  2. Insufficiency fracture of the right sacral ala.  3. Myositis of the left greater than right obturator and adductor musculature.    Procedure  Condom catheter removed and a adamson catheter placement  Procedure:  Adamson catheter placement    The penis was prepped and draped in a sterile fashion using betadine swaps.  2% lidocaine jelly was injected into the urethra as a local anesthetics.  A new 16 F catheter was inserted into the bladder in a sterile fashion without any  difficulty.  A return of urine was confirmed and the catheter balloon was inflated with 10 cc water.  The catheter was secured with a catheter montesinos and was connected to a leg bag.  A large bag was also provided to pt for night time use.  He tolerated a procedure well.    Assessment and Plan:  James was seen today for osteomyelitis .    Diagnoses and all orders for this visit:    Acute cystitis without hematuria  -     Urine culture    Osteomyelitis of symphysis pubis  -     CBC auto differential; Future  -     Comprehensive metabolic panel; Future    CA prostate, adenoca    CML (chronic myelocytic leukemia)    recurrent prostatitis with possible fistular involving his prostate, pubic symphysis.  Discussed his MRI findings.  I will first change his condom catheter to an indwelling adamson catheter in order to divert his urine.  Will schedule him for inter-op RUG to r/o fistula along with cysto evaluation.  May consider to place a suprapubic catheter to divert urine for a long term until his osteomyelitis is all treated and resolve.d  Will refer him back to Dr. Mancilla in ID and Dr. Hall in Ortho.  .  I spent 40 minutes with the patient of which more than half was spent in direct consultation with the patient in regards to our treatment and plan.    Follow-up:  Follow up cysto suprapubic catheter placement, RUG.

## 2020-01-21 NOTE — TELEPHONE ENCOUNTER
----- Message from Liz Cantrell sent at 1/21/2020  9:25 AM CST -----  Contact: Self [phone]  Pt states DR. Wells would like dr ochoa to look at his urine culture states he has an infection Dr. Wells said he would like him to give pt medications to treat the infection please call back to discuss

## 2020-01-21 NOTE — PROGRESS NOTES
CC: suprapubic area pain, left groin pain, osteomyelitis of pubic symphysis    James Quan Jr. is a 74 y.o. man who is here for the evaluation of recurrent UTI, suprapubic pain.  C/o suprapubic and left groin pain.  Has been wearing a condom catheter due to constant urinary incontinence.  Was seen by ID and Ortho regarding osteomyelitis of pubic symphasis.  He is here for a follow up.    He was evaluated by me on 11/7/19 with cysto.  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   Sphincter: competent.  Prostate: Estimated Length Prostatic Urethra: s/p TURP with open channel.  But possible necrotic tissues in the prostate fossa with a few stones stuck to this necrotic tissues. No obstruction seen.    Bladder neck: patent with no stricture  Bladder:  Normal bladder.   Normal ureteral orifices bilaterally.   Moderate trabeculation.    He is here today with CT of pelvis.  Pt reports that he no longer has dysuria or problems with urination.  However, still has suprapubic and left groin pain.     a diagnosis of adenocarcinoma the prostate having been made in 2002.  The patient underwent external beam radiation therapy.the patient is also status post TURP.  The patient is incontinent but does urinate on his own periodically.  The patient has documented urethral stricture disease and catheterizes himself once a week.  He states that he meets minimal resistance.  Pt has seen me in the past (2014) pt has hx prostate cancer, stricture and xrt. Pt also has  Incontinence. Dr zhang would like pt to see dr wells tomorrow if possible.  Pt is complaining of pain involving the lower abdomen and groin area.  Has been on Vantin for 1 wk and took Azo for dysuria.  Overall his pain is better.    Denies flank pain,  hematuria.      Past Medical History:   Diagnosis Date    Anemia     Aplasia bone marrow     Asthma     CA of prostate     CML (chronic myeloid leukemia)     Coronary artery disease     Heart attack 2014    Heart failure     Hyperlipidemia     Hypertension     Hypothyroidism     Kidney stones     Prostate cancer     Thyroid disease     Urinary incontinence      Past Surgical History:   Procedure Laterality Date    APPENDECTOMY  1966    ATRIAL CARDIAC PACEMAKER INSERTION  10/2018    CHOLECYSTECTOMY  1980    CORONARY ARTERY BYPASS GRAFT  1988    CORONARY STENT PLACEMENT  1990    CYSTOSCOPY      heart attack  2014    PROSTATE SURGERY      TURP    radiation      for ca prostate    TONSILLECTOMY      As child    TRANSURETHRAL RESECTION OF PROSTATE      x3    TRANSURETHRAL RESECTION OF PROSTATE      urethral dilation      VASECTOMY  1979     Social History     Tobacco Use    Smoking status: Former Smoker     Packs/day: 1.00     Years: 25.00     Pack years: 25.00     Types: Cigarettes     Last attempt to quit: 1988     Years since quittin.7    Smokeless tobacco: Never Used   Substance Use Topics    Alcohol use: No    Drug use: No     Family History   Problem Relation Age of Onset    Diabetes Father      Allergy:  Review of patient's allergies indicates:   Allergen Reactions    Niacin preparations Rash    Bactrim [sulfamethoxazole-trimethoprim] Hives and Itching     Outpatient Encounter Medications as of 2020   Medication Sig Dispense Refill    ascorbic acid, vitamin C, (VITAMIN C) 100 MG tablet Take 100 mg by mouth once daily.      aspirin (ECOTRIN) 81 MG EC tablet Take 81 mg by mouth once daily.      b complex vitamins tablet Take 1 tablet by mouth once daily.      beta carotene 55294 UNIT capsule Take 10,000 Units by mouth once daily.       catheter (BARD RUBBER UTILITY CATHETER) 14 Fr Misc 1 Units by Misc.(Non-Drug; Combo Route) route every 7 days. 30  each 3    cyanocobalamin (VITAMIN B-12) 1000 MCG tablet Take 1,000 mcg by mouth nightly.       ferrous sulfate 325 (65 FE) MG EC tablet Take 325 mg by mouth once daily.      fish oil-omega-3 fatty acids 300-1,000 mg capsule Take by mouth once daily.      folic acid (FOLVITE) 400 MCG tablet Take 400 mcg by mouth once daily.      GLEEVEC 400 mg Tab 400 mg once daily.       isosorbide mononitrate (IMDUR) 30 MG 24 hr tablet Take 30 mg by mouth once daily.      levothyroxine (SYNTHROID) 100 MCG tablet TAKE 1 TABLET EVERY DAY 90 tablet 3    lidocaine HCL 2% (XYLOCAINE) 2 % jelly Apply topically as needed. 30 mL 11    losartan (COZAAR) 100 MG tablet Take 1 tablet (100 mg total) by mouth once daily. 30 tablet 0    lycopene 10 mg Cap Take 1 capsule by mouth nightly.       meclizine (ANTIVERT) 25 mg tablet Take 1 tablet (25 mg total) by mouth 3 (three) times daily as needed. 30 tablet 0    NITROSTAT 0.4 mg SL tablet Place 0.4 mg under the tongue every 5 (five) minutes as needed.       PRASTERONE, DHEA, (DHEA ORAL) Take 25 mg by mouth once daily. 50 mg. tablet       RANEXA 1,000 mg Tb12 Take 500 mg by mouth 2 (two) times daily.       rosuvastatin (CRESTOR) 10 MG tablet Take 1 tablet (10 mg total) by mouth every evening. 30 tablet 5    traMADol (ULTRAM) 50 mg tablet TAKE 1 TABLET BY MOUTH EVERY 6 HOURS AS NEEDED FOR PAIN 60 tablet 0    vitamin D (VITAMIN D3) 1000 units Tab Take 2,000 Units by mouth once daily.       No facility-administered encounter medications on file as of 1/21/2020.      Review of Systems   ROS  Physical Exam     Vitals:    01/21/20 0804   BP: 119/68   Pulse: (!) 56     Physical Exam   Constitutional: He is oriented to person, place, and time. He appears well-developed and well-nourished. No distress.   HENT:   Head: Normocephalic and atraumatic.   Right Ear: External ear normal.   Left Ear: External ear normal.   Nose: Nose normal.   Mouth/Throat: Oropharynx is clear and moist.   Eyes:  Conjunctivae are normal. Pupils are equal, round, and reactive to light.   Neck: Normal range of motion. Neck supple. No JVD present. No tracheal deviation present. No thyromegaly present.   Cardiovascular: Normal rate, regular rhythm, normal heart sounds and intact distal pulses.  Exam reveals no gallop and no friction rub.    No murmur heard.  Pulmonary/Chest: Effort normal and breath sounds normal. No respiratory distress. He has no wheezes. He exhibits no tenderness.   Abdominal: Soft. Bowel sounds are normal. He exhibits no distension and no mass. There is no tenderness. There is no rebound and no guarding.   Genitourinary: Rectum normal and penis normal. No penile tenderness.   Genitourinary Comments: Prostate 40 grams with negative nodule or positive tenderness  Minimal EPS.  Under microscopic exam: no significant WBCs noted but suspicious GNR?   Musculoskeletal: Normal range of motion. He exhibits no edema, tenderness or deformity.   Lymphadenopathy:     He has no cervical adenopathy.   Neurological: He is alert and oriented to person, place, and time.   Skin: Skin is warm and dry. He is not diaphoretic.     Psychiatric: He has a normal mood and affect. His behavior is normal. Thought content normal.     Genitalia:  Scrotum: no rash or lesion  Normal symmetric epididymis without masses  Normal vas palpated  Normal size, symmetric testicles with no masses   Normal urethral meatus with no discharge  Normal circumcised penis with no lesion   Rectal:  Normal perineum and anus upon inspection.  Normal tone, no masses or tenderness;     LABS:  Lab Results   Component Value Date    PSA 0.37 04/23/2014    PSADIAG 0.44 12/06/2019    PSADIAG 0.82 11/07/2016    PSADIAG 0.50 06/19/2015     Results for orders placed or performed in visit on 12/06/19   Prostate Specific Antigen, Diagnostic   Result Value Ref Range    PSA DIAGNOSTIC 0.44 0.00 - 4.00 ng/mL   Results for orders placed or performed in visit on 11/07/16    Prostate Specific Antigen, Diagnostic   Result Value Ref Range    PSA DIAGNOSTIC 0.82 0.00 - 4.00 ng/mL   Results for orders placed or performed in visit on 06/19/15   Prostate Specific Antigen, Diagnostic   Result Value Ref Range    PSA DIAGNOSTIC 0.50 0.00 - 4.00 ng/mL     Lab Results   Component Value Date    CREATININE 1.4 12/06/2019    CREATININE 1.1 10/08/2019    CREATININE 1.3 10/07/2019     No results found for this or any previous visit.  Urine Culture, Routine   Date Value Ref Range Status   11/19/2019   Final    Multiple organisms isolated. None in predominance.  Repeat if   11/19/2019 clinically necessary.  Final     No results found for: HGBA1C  CT pelvis 12/6/19  There is new pubic symphyseal diastasis with periarticular sclerosis and lucency and some degree of fragmentation.  Whether this relates to contiguous osteomyelitis or osteonecrosis is uncertain in this patient with a history of prostate radiotherapy.  Inferior and posterior to the symphysis there is a small low-density area with small calcifications and minimal gas.  Whether this relates to infection or fistula is uncertain.    There's a small amount of air in the bladder in there's some bladder wall thickening    Degenerative changes are seen in the spine and hips and fat containing inguinal hernia formation is noted bilaterally.    MRI 1/14/20  1. 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  2. Insufficiency fracture of the right sacral ala.  3. Myositis of the left greater than right obturator and adductor musculature.    Procedure  Condom catheter removed and a adamson catheter placement  Procedure:  Adamson catheter placement    The penis was prepped and draped in a sterile fashion using betadine swaps.  2% lidocaine jelly was injected into the urethra as a local anesthetics.  A new 16 F catheter was inserted into the bladder in a sterile fashion without any  difficulty.  A return of urine was confirmed and the catheter balloon was inflated with 10 cc water.  The catheter was secured with a catheter montesinos and was connected to a leg bag.  A large bag was also provided to pt for night time use.  He tolerated a procedure well.    Assessment and Plan:  James was seen today for osteomyelitis .    Diagnoses and all orders for this visit:    Acute cystitis without hematuria  -     Urine culture    Osteomyelitis of symphysis pubis  -     CBC auto differential; Future  -     Comprehensive metabolic panel; Future    CA prostate, adenoca    CML (chronic myelocytic leukemia)    recurrent prostatitis with possible fistular involving his prostate, pubic symphysis.  Discussed his MRI findings.  I will first change his condom catheter to an indwelling adamson catheter in order to divert his urine.  Will schedule him for inter-op RUG to r/o fistula along with cysto evaluation.  May consider to place a suprapubic catheter to divert urine for a long term until his osteomyelitis is all treated and resolve.d  Will refer him back to Dr. Mancilla in ID and Dr. Hall in Ortho.  .  I spent 40 minutes with the patient of which more than half was spent in direct consultation with the patient in regards to our treatment and plan.    Follow-up:  Follow up cysto suprapubic catheter placement, RUG.

## 2020-01-21 NOTE — TELEPHONE ENCOUNTER
Called patient back and he stated he just saw  today and did a urine specimen. He stated  would like for him to look at results when they come back to prescribe abx for him.

## 2020-01-22 ENCOUNTER — TELEPHONE (OUTPATIENT)
Dept: FAMILY MEDICINE | Facility: CLINIC | Age: 75
End: 2020-01-22

## 2020-01-22 NOTE — TELEPHONE ENCOUNTER
----- Message from Shira Gan RN sent at 1/22/2020 10:55 AM CST -----  Pt. Is scheduled for cystoscopy with fluoro, urethrogram, retrograde. Insertion suprapubic catheter by Dr. Wells 1/29/20. 75 minutes general anesthesia  Do you feel pt is optimal to proceed.

## 2020-01-22 NOTE — ANESTHESIA PAT ROS NOTE
01/22/2020  James Quan Jr. is a 74 y.o., male.      Pre-op Assessment         Review of Systems  Anesthesia Hx:  No problems with previous Anesthesia  History of prior surgery of interest to airway management or planning: heart surgery. Previous anesthesia: General cysto 2014 with general anesthesia.    Social:  Former Smoker, No Alcohol Use    Hematology/Oncology:         -- Anemia: Chronic Myelogenous Leukemia (CML) and Prostate  Hematology Comments: CML  Current/Recent Cancer. radiation  Oncology Comments: Prostate ca  CML     EENT/Dental:EENT/Dental Normal   Cardiovascular:   Exercise tolerance: poor Pacemaker Hypertension Past MI CAD  CABG/stent  hyperlipidemia medtronic pacemaker Coronary Artery Disease:  Hypertension    Pulmonary:   Hx of asthma as a child   Renal/:   Chronic Renal Disease Chronic urethral stricture  Incontinence  Osteomyelitis of symphysis pubis  Hx prostate ca   Hepatic/GI:  Hepatic/GI Normal    Musculoskeletal:   Uses walker   Neurological:   Headaches Hx scalp pain   Endocrine:   Hypothyroidism    Dermatological:  Skin Normal    Psych:  Psychiatric Normal              Anesthesia Assessment: Preoperative EQUATION    Planned Procedure: Procedure(s) (LRB):  CYSTOSCOPY NEEDS FLUORO (N/A)  URETHROGRAM, RETROGRADE (N/A)  INSERTION, SUPRAPUBIC CATHETER (N/A)  Requested Anesthesia Type:General  Surgeon: Gilberto Wells MD  Service: Urology  Known or anticipated Date of Surgery:1/29/2020    Surgeon notes: reviewed    Electronic QUestionnaire Assessment completed via nurse interview with patient.        Triage considerations:         Previous anesthesia records:LILLIAN 12/13/14    Last PCP note: within 1 month , within [facility]plant[facility]  Subspecialty notes: Cardiology: General, Infectious Disease, Ortho, Urology    Other important co-morbidities: HLD, HTN, Hypothyroid and  hx CML/ CAD/anemia / Has Pacemaker     Tests already available:  Available tests,  within 1 month , within [facility] .             Instructions given. (See in Nurse's note)    Optimization:  Messaged PCP & Cardiology of procedure      Plan:    Testing:  none   Pre-anesthesia  visit       Visit focus: has had multiple anesthesia exposures     Consultation:messaged pcp & cardiologist of procedure       Navigation: .                              Straight Line to surgery.               No tests, anesthesia preop clinic visit, or consult required.

## 2020-01-23 LAB — BACTERIA UR CULT: ABNORMAL

## 2020-01-23 NOTE — PRE-PROCEDURE INSTRUCTIONS
16 hours ago (3:07 PM)      Alex Mcallister MD 16 hours ago (3:07 PM)         Yes. He is medically cleared for anesthesia

## 2020-01-24 ENCOUNTER — TELEPHONE (OUTPATIENT)
Dept: FAMILY MEDICINE | Facility: CLINIC | Age: 75
End: 2020-01-24

## 2020-01-24 ENCOUNTER — TELEPHONE (OUTPATIENT)
Dept: INFECTIOUS DISEASES | Facility: CLINIC | Age: 75
End: 2020-01-24

## 2020-01-24 DIAGNOSIS — M86.9 OSTEOMYELITIS, UNSPECIFIED SITE, UNSPECIFIED TYPE: Primary | ICD-10-CM

## 2020-01-24 NOTE — TELEPHONE ENCOUNTER
Pt would like to know if you or someone at [facility] can place his picline so he does not have to go to main campus to have it done?

## 2020-01-24 NOTE — TELEPHONE ENCOUNTER
Called patient and informed culture results showed ecoli and will need PICC line to get ceftriaxone IV 2g q24 x 6 weeks. Patient understood.

## 2020-01-24 NOTE — TELEPHONE ENCOUNTER
----- Message from Lauren Currie sent at 1/24/2020  2:02 PM CST -----  Contact: pt  Pt needing a call back regarding questions if any medication will be prescribed     Pt contact #[phone]

## 2020-01-24 NOTE — TELEPHONE ENCOUNTER
----- Message from Rula Robins sent at 2020  2:29 PM CST -----  Contact: self  James Quan Jr.  MRN: [id number]  : 1945  PCP: Alex Mcallister  Home Phone      [phone]  Work Phone      Not on file.  Mobile          [phone]      MESSAGE:   Patient would like to talk to a nurse about doing picline over here instead of going to the city to it with another provider.     [phone]

## 2020-01-25 NOTE — TELEPHONE ENCOUNTER
Yes. Anesthesia at the hospital puts them in. Call Abbi to have it set up with Mohamud Chambers, Or Sergio. Thanks

## 2020-01-27 ENCOUNTER — TELEPHONE (OUTPATIENT)
Dept: UROLOGY | Facility: CLINIC | Age: 75
End: 2020-01-27

## 2020-01-27 ENCOUNTER — ANESTHESIA (OUTPATIENT)
Dept: SURGERY | Facility: HOSPITAL | Age: 75
End: 2020-01-27
Payer: MEDICARE

## 2020-01-27 ENCOUNTER — ANESTHESIA EVENT (OUTPATIENT)
Dept: SURGERY | Facility: HOSPITAL | Age: 75
End: 2020-01-27
Payer: MEDICARE

## 2020-01-27 ENCOUNTER — HOSPITAL ENCOUNTER (OUTPATIENT)
Facility: HOSPITAL | Age: 75
Discharge: HOME | End: 2020-01-27
Attending: FAMILY MEDICINE | Admitting: FAMILY MEDICINE
Payer: MEDICARE

## 2020-01-27 ENCOUNTER — TELEPHONE (OUTPATIENT)
Dept: FAMILY MEDICINE | Facility: CLINIC | Age: 75
End: 2020-01-27

## 2020-01-27 DIAGNOSIS — M86.9 OSTEOMYELITIS, UNSPECIFIED SITE, UNSPECIFIED TYPE: Primary | ICD-10-CM

## 2020-01-27 PROCEDURE — 36569 INSJ PICC 5 YR+ W/O IMAGING: CPT | Mod: HCNC | Performed by: NURSE ANESTHETIST, CERTIFIED REGISTERED

## 2020-01-27 NOTE — TELEPHONE ENCOUNTER
Spoke to Ying with surgery department at [facility].     States she will schedule patient for PICC once orders placed per Dr. Mcallister.

## 2020-01-27 NOTE — ANESTHESIA PROCEDURE NOTES
Central Line    Diagnosis: osteomyelitis  Doctor requesting consult: chelo  Patient location during procedure: done out of OR  Procedure start time: 1/27/2020 1:50 PM  Timeout: 1/27/2020 1:40 PM  Procedure end time: 1/27/2020 2:45 PM    Staffing  Authorizing Provider: Sergio Giles CRNA  Performing Provider: Sergio Giles CRNA    Staffing  Resident/CRNA: Reyes Hill CRNA  Preanesthetic Checklist  Completed: patient identified, site marked, surgical consent, pre-op evaluation, timeout performed, IV checked, risks and benefits discussed, monitors and equipment checked and anesthesia consent given  Indication   Indication: med administration     Anesthesia   local infiltration    Central Line   Skin Prep: skin prepped with ChloraPrep, skin prep agent completely dried prior to procedure  maximum sterile barriers used during central venous catheter insertion  hand hygiene performed prior to central venous catheter insertion  Location: right, basilic.   Catheter type: PICC double lumen  Catheter Size: 5 Fr  Inserted Catheter Length: 30 cm  Ultrasound: vascular probe with ultrasound  Vessel Caliber: medium, patent  Vascular Doppler:  not done, compressibility normal  Needle advanced into vessel with real time Ultrasound guidance.  Guidewire confirmed in vessel.  Image recorded and saved.  Manometry: none  Insertion Attempts: 2   Securement:chlorhexidine patch, sterile dressing applied and blood return through all ports    Post-Procedure   X-Ray: no pneumothorax on x-ray, placement verified by x-ray, tip termination and successful placement  Tip termination comments: verified by radiologist   Adverse Events:none    Guidewire Guidewire removed intact. Guidewire removed intact, verified with nurse.

## 2020-01-27 NOTE — TELEPHONE ENCOUNTER
Set up home health and notify pt please. He needs to start Rocephin today or tomorrow thanks   Orders are in

## 2020-01-27 NOTE — TELEPHONE ENCOUNTER
Spoke to Clover Esparza MA for Dr. Tian Mancilla, states she will contact company to have patient set up with IV antibiotics since he had PICC line placed today at [facility]. States the company with instruct patient on how to start IV antibiotic treatment, states the company will help administer first dose.     Spoke to Marion with [facility], informed that patient will be needing home health and continued IV antibiotic treatment and wound dressing changes. States they will contact patient to schedule visit on 1/28/2020.

## 2020-01-27 NOTE — ANESTHESIA PROCEDURE NOTES
PICC line    Diagnosis: osteomyelitis  Doctor requesting consult: Alex Mcallister  Patient location during procedure: done out of OR  Procedure start time: 1/27/2020 1:42 PM  Timeout: 1/27/2020 1:40 PM  Procedure end time: 1/27/2020 2:00 PM    Staffing  Authorizing Provider: Sergio Giles CRNA  Performing Provider: Sergio Giles CRNA    Staffing  Resident/CRNA: Reyes Hill CRNA  Performed: resident/CRNA   Preanesthetic Checklist  Completed: patient identified, site marked, surgical consent, pre-op evaluation, timeout performed, IV checked, risks and benefits discussed, monitors and equipment checked and anesthesia consent given  Indication   Indication: med administration     Anesthesia   general anesthesia    Central Line   Skin Prep: skin prepped with ChloraPrep, skin prep agent completely dried prior to procedure  maximum sterile barriers used during central venous catheter insertion  hand hygiene performed prior to central venous catheter insertion  Location: right, basilic.   Catheter type: PICC double lumen  Catheter Size: 5 Fr  Inserted Catheter Length: 30 cm  Ultrasound: vascular probe with ultrasound  Vessel Caliber: medium, patent  Vascular Doppler:  not done, compressibility normal  Needle advanced into vessel with real time Ultrasound guidance.  Guidewire confirmed in vessel.  Image recorded and saved.  Manometry: none  Insertion Attempts: 2   Securement:sterile dressing applied and blood return through all ports    Post-Procedure   X-Ray: placement verified by x-ray, tip termination and successful placement  Adverse Events:none    Guidewire Guidewire removed intact. Guidewire removed intact, verified with nurse.

## 2020-01-28 ENCOUNTER — TELEPHONE (OUTPATIENT)
Dept: UROLOGY | Facility: CLINIC | Age: 75
End: 2020-01-28

## 2020-01-28 ENCOUNTER — DOCUMENTATION ONLY (OUTPATIENT)
Dept: CARDIOLOGY | Facility: HOSPITAL | Age: 75
End: 2020-01-28

## 2020-01-28 NOTE — PROGRESS NOTES
Patient has been identified as having an implanted cardiac rhythm device (CRD); the implanted device is a Medtronic pacemaker.      The reported surgical procedure is BELOW the umbilicus; per protocol, magnet application is not needed and device reprogramming is not required.    For additional questions, please contact the Arrhythmia Department at Ext [phone].

## 2020-01-29 ENCOUNTER — HOSPITAL ENCOUNTER (OUTPATIENT)
Facility: HOSPITAL | Age: 75
Discharge: HOME | End: 2020-01-29
Attending: UROLOGY | Admitting: UROLOGY
Payer: MEDICARE

## 2020-01-29 ENCOUNTER — ANESTHESIA (OUTPATIENT)
Dept: SURGERY | Facility: HOSPITAL | Age: 75
End: 2020-01-29
Payer: MEDICARE

## 2020-01-29 VITALS
TEMPERATURE: 98 F | OXYGEN SATURATION: 99 % | BODY MASS INDEX: 25.23 KG/M2 | RESPIRATION RATE: 18 BRPM | HEART RATE: 63 BPM | DIASTOLIC BLOOD PRESSURE: 65 MMHG | WEIGHT: 157 LBS | SYSTOLIC BLOOD PRESSURE: 146 MMHG | HEIGHT: 66 IN

## 2020-01-29 DIAGNOSIS — N30.00 ACUTE CYSTITIS WITHOUT HEMATURIA: ICD-10-CM

## 2020-01-29 DIAGNOSIS — C61 CA PROSTATE, ADENOCA: Primary | ICD-10-CM

## 2020-01-29 DIAGNOSIS — N39.0 RECURRENT UTI: ICD-10-CM

## 2020-01-29 DIAGNOSIS — M86.9 OSTEOMYELITIS OF SYMPHYSIS PUBIS: ICD-10-CM

## 2020-01-29 PROCEDURE — D9220A PRA ANESTHESIA: ICD-10-PCS | Mod: HCNC,CRNA,, | Performed by: NURSE ANESTHETIST, CERTIFIED REGISTERED

## 2020-01-29 PROCEDURE — 37000009 HC ANESTHESIA EA ADD 15 MINS: Mod: HCNC | Performed by: UROLOGY

## 2020-01-29 PROCEDURE — 52000 PR CYSTOURETHROSCOPY: ICD-10-PCS | Mod: HCNC,,, | Performed by: UROLOGY

## 2020-01-29 PROCEDURE — 71000044 HC DOSC ROUTINE RECOVERY FIRST HOUR: Mod: HCNC | Performed by: UROLOGY

## 2020-01-29 PROCEDURE — 25500020 PHARM REV CODE 255: Mod: HCNC | Performed by: UROLOGY

## 2020-01-29 PROCEDURE — 71000015 HC POSTOP RECOV 1ST HR: Mod: HCNC | Performed by: UROLOGY

## 2020-01-29 PROCEDURE — C1758 CATHETER, URETERAL: HCPCS | Mod: HCNC | Performed by: UROLOGY

## 2020-01-29 PROCEDURE — 51610 PR INJECT FOR RETROGRADE URETHOCYSTO: ICD-10-PCS | Mod: 51,HCNC,, | Performed by: UROLOGY

## 2020-01-29 PROCEDURE — 74450 X-RAY URETHRA/BLADDER: CPT | Mod: 26,HCNC,, | Performed by: UROLOGY

## 2020-01-29 PROCEDURE — 37000008 HC ANESTHESIA 1ST 15 MINUTES: Mod: HCNC | Performed by: UROLOGY

## 2020-01-29 PROCEDURE — D9220A PRA ANESTHESIA: ICD-10-PCS | Mod: HCNC,ANES,, | Performed by: ANESTHESIOLOGY

## 2020-01-29 PROCEDURE — 36000706: Mod: HCNC | Performed by: UROLOGY

## 2020-01-29 PROCEDURE — D9220A PRA ANESTHESIA: Mod: HCNC,CRNA,, | Performed by: NURSE ANESTHETIST, CERTIFIED REGISTERED

## 2020-01-29 PROCEDURE — 36000707: Mod: HCNC | Performed by: UROLOGY

## 2020-01-29 PROCEDURE — 51610 INJECTION FOR BLADDER X-RAY: CPT | Mod: 51,HCNC,, | Performed by: UROLOGY

## 2020-01-29 PROCEDURE — 63600175 PHARM REV CODE 636 W HCPCS: Mod: HCNC | Performed by: NURSE ANESTHETIST, CERTIFIED REGISTERED

## 2020-01-29 PROCEDURE — 63600175 PHARM REV CODE 636 W HCPCS: Mod: HCNC | Performed by: STUDENT IN AN ORGANIZED HEALTH CARE EDUCATION/TRAINING PROGRAM

## 2020-01-29 PROCEDURE — 52000 CYSTOURETHROSCOPY: CPT | Mod: HCNC,,, | Performed by: UROLOGY

## 2020-01-29 PROCEDURE — 25000003 PHARM REV CODE 250: Mod: HCNC | Performed by: NURSE ANESTHETIST, CERTIFIED REGISTERED

## 2020-01-29 PROCEDURE — 74450 PR  X-RAY URETHROCYSTOGRAM: ICD-10-PCS | Mod: 26,HCNC,, | Performed by: UROLOGY

## 2020-01-29 PROCEDURE — D9220A PRA ANESTHESIA: Mod: HCNC,ANES,, | Performed by: ANESTHESIOLOGY

## 2020-01-29 RX ORDER — MIDAZOLAM HYDROCHLORIDE 1 MG/ML
INJECTION, SOLUTION INTRAMUSCULAR; INTRAVENOUS
Status: DISCONTINUED | OUTPATIENT
Start: 2020-01-29 | End: 2020-01-29

## 2020-01-29 RX ORDER — PROPOFOL 10 MG/ML
VIAL (ML) INTRAVENOUS CONTINUOUS PRN
Status: DISCONTINUED | OUTPATIENT
Start: 2020-01-29 | End: 2020-01-29

## 2020-01-29 RX ORDER — LIDOCAINE HYDROCHLORIDE 20 MG/ML
INJECTION INTRAVENOUS
Status: DISCONTINUED | OUTPATIENT
Start: 2020-01-29 | End: 2020-01-29

## 2020-01-29 RX ORDER — HYDROMORPHONE HYDROCHLORIDE 1 MG/ML
0.2 INJECTION, SOLUTION INTRAMUSCULAR; INTRAVENOUS; SUBCUTANEOUS EVERY 5 MIN PRN
Status: DISCONTINUED | OUTPATIENT
Start: 2020-01-29 | End: 2020-01-29 | Stop reason: HOSPADM

## 2020-01-29 RX ORDER — LIDOCAINE HYDROCHLORIDE 10 MG/ML
1 INJECTION, SOLUTION EPIDURAL; INFILTRATION; INTRACAUDAL; PERINEURAL ONCE
Status: DISCONTINUED | OUTPATIENT
Start: 2020-01-29 | End: 2020-01-29 | Stop reason: HOSPADM

## 2020-01-29 RX ORDER — PROPOFOL 10 MG/ML
VIAL (ML) INTRAVENOUS
Status: DISCONTINUED | OUTPATIENT
Start: 2020-01-29 | End: 2020-01-29

## 2020-01-29 RX ORDER — SODIUM CHLORIDE 0.9 % (FLUSH) 0.9 %
3 SYRINGE (ML) INJECTION
Status: DISCONTINUED | OUTPATIENT
Start: 2020-01-29 | End: 2020-01-29 | Stop reason: HOSPADM

## 2020-01-29 RX ORDER — MEPERIDINE HYDROCHLORIDE 50 MG/ML
12.5 INJECTION INTRAMUSCULAR; INTRAVENOUS; SUBCUTANEOUS EVERY 10 MIN PRN
Status: DISCONTINUED | OUTPATIENT
Start: 2020-01-29 | End: 2020-01-29 | Stop reason: HOSPADM

## 2020-01-29 RX ORDER — SODIUM CHLORIDE 9 MG/ML
INJECTION, SOLUTION INTRAVENOUS CONTINUOUS
Status: DISCONTINUED | OUTPATIENT
Start: 2020-01-29 | End: 2020-01-29 | Stop reason: HOSPADM

## 2020-01-29 RX ORDER — KETAMINE HCL IN 0.9 % NACL 50 MG/5 ML
SYRINGE (ML) INTRAVENOUS
Status: DISCONTINUED | OUTPATIENT
Start: 2020-01-29 | End: 2020-01-29

## 2020-01-29 RX ADMIN — PROPOFOL 50 MG: 10 INJECTION, EMULSION INTRAVENOUS at 01:01

## 2020-01-29 RX ADMIN — GENTAMICIN SULFATE 160 MG: 40 INJECTION, SOLUTION INTRAMUSCULAR; INTRAVENOUS at 01:01

## 2020-01-29 RX ADMIN — LIDOCAINE HYDROCHLORIDE 60 MG: 20 INJECTION, SOLUTION INTRAVENOUS at 01:01

## 2020-01-29 RX ADMIN — PROPOFOL 20 MG: 10 INJECTION, EMULSION INTRAVENOUS at 01:01

## 2020-01-29 RX ADMIN — Medication 10 MG: at 01:01

## 2020-01-29 RX ADMIN — SODIUM CHLORIDE: 0.9 INJECTION, SOLUTION INTRAVENOUS at 01:01

## 2020-01-29 RX ADMIN — MIDAZOLAM HYDROCHLORIDE 1 MG: 1 INJECTION, SOLUTION INTRAMUSCULAR; INTRAVENOUS at 01:01

## 2020-01-29 RX ADMIN — PROPOFOL 80 MCG/KG/MIN: 10 INJECTION, EMULSION INTRAVENOUS at 01:01

## 2020-01-29 RX ADMIN — SODIUM CHLORIDE: 0.9 INJECTION, SOLUTION INTRAVENOUS at 11:01

## 2020-01-29 NOTE — ANESTHESIA RELEASE NOTE
"Anesthesia Release from PACU Note    Patient: James Quan Jr.    Procedure(s) Performed: Procedure(s) (LRB):  CYSTOSCOPY NEEDS FLUORO (N/A)  URETHROGRAM, RETROGRADE (N/A)    Anesthesia type: GEN    Post pain: Adequate analgesia reported    Post assessment: no apparent anesthetic complications, tolerated procedure well and no evidence of recall    Post vital signs: /61   Pulse 69   Temp 36.7 °C (98.1 °F) (Temporal)   Resp 16   Ht 5' 6" (1.676 m)   Wt 71.2 kg (157 lb)   SpO2 99%   BMI 25.34 kg/m²     Level of consciousness: awake, alert and oriented    Nausea/Vomiting: no nausea/no vomiting    Complications: none    Airway Patency: patent    Respiratory: unassisted, spontaneous ventilation, room air    Cardiovascular: stable and blood pressure at baseline    Hydration: euvolemic    "

## 2020-01-29 NOTE — OP NOTE
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, history pelvic radiation     Patient Active Problem List    Diagnosis Date Noted    Recurrent UTI 01/29/2020    Osteomyelitis of symphysis pubis 01/21/2020    Hx of radiation therapy 12/13/2019    Ataxia 10/08/2019    Weakness 10/07/2019    Dizziness 10/07/2019    Acute suprapubic pain 10/02/2019    Essential hypertension 05/22/2019    Acute cystitis without hematuria 05/20/2019    S/P placement of cardiac pacemaker 01/29/2019    Drug-induced bradycardia 10/24/2017    Dyslipidemia 02/07/2017    Coronary artery disease involving coronary bypass graft of native heart without angina pectoris 02/07/2017    Hypothyroidism due to acquired atrophy of thyroid 03/15/2016    CML (chronic myelocytic leukemia) 03/15/2016    Scalp pain 02/27/2015    Chronic urethral stricture 12/03/2014    CA prostate, adenoca 05/28/2014    Incontinence 05/07/2014       Post-Op Diagnosis: same    Procedure(s) Performed:   1.  Cystoscopy   2.  Retrograde urethrogram     Specimen(s): None    Staff Surgeon: Gilberto Wells MD    Assistant Surgeon: Racquel Villegas MD    Anesthesia: General MAC anesthesia    Indications: James Quan Jr. is a 74 y.o. 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      Findings:   Cystoscopy showed areas of posterior wall erythema concerning for catheter irritation   Multiple brown-tinged circular lesions throughout bladder wall concerning for cystitis cystica  Prostatic urethra with large anterior defect with calcifications embedded within the urethral wall seen on urethroscopy  Evidence of contrast extravasation of the prostatic urethra more towards the left    Estimated Blood Loss:  min    Drains: 16 Fr adamson catheter     Procedure in Detail:  After risks, benefits and possible complications of cystoscopy were explained, the patient elected to undergo the procedure and informed consent was obtained. All questions were answered in the linnea-operative area. The patient was transferred to the cystoscopy suite and placed in the supine position.  SCDs were applied and working.  Anesthesia was administered.  Once the patient was adequately sedated, he was placed in the dorsal lithotomy position and prepped and draped in the usual sterile fashion.  Time out was performed, linnea-procedural antibiotics were confirmed.     A rigid cystoscope in a 22 Fr sheath was introduced into the bladder per urethra. This passed easily.  The entire urethra was visualized which showed no masses or strictures.  There was a large anterior defect in the prostatic urethra that had calcifications embedded within the walls of the defect. On cystoscopy, the right and left ureteral orifices were identified in the normal anatomic position and were seen effluxing clear urine.  Formal cystoscopy was performed which revealed areas of posterior wall erythema concerning for catheter irritation. There were multiple brown-tinged circular lesions throughout bladder wall concerning for cystitis cystica. There were no masses or lesions suspicious for malignancy, trabeculations, no bladder stones and no bladder diverticuli.      A 16 Fr adamson catheter was placed and the bladder was drained. A 60cc syringe was filled with cysto conray and injected into the urethra with the penis on stretch.     There was evidence of contrast extravasation outside of the prostatic urethra more towards the patient's left side. We reinserted the adamson catheter and blew up the balloon with 10cc sterile water. We then performed a pericatheter RUG with the adamson on tension so as to get a better idea of were the extravasation occurred. Again, there was  extravasation predominantly on the patient's left prostatic urethra, although there was a bit of contrast extravasating from the right side as well. The bladder was drained and the catheter was left in place at procedure end.     The patient tolerated the procedure well and was transferred to recovery in stable condition.    Disposition:  The patient will follow up in clinic in 2 weeks for a catheter exchange.  He will continue his antibiotic regimen.     Racquel Villegas MD

## 2020-01-29 NOTE — INTERVAL H&P NOTE
The patient has been examined and the H&P has been reviewed:    I concur with the findings and no changes have occurred since H&P was written.     Will give rocephin for procedure as he is already on this for E coli UTI.    Consent obtained     Anesthesia/Surgery risks, benefits and alternative options discussed and understood by patient/family.          Active Hospital Problems    Diagnosis  POA    Recurrent UTI [N39.0]  Yes      Resolved Hospital Problems   No resolved problems to display.

## 2020-01-29 NOTE — ANESTHESIA PREPROCEDURE EVALUATION
01/29/2020  James Quan Jr. is a 74 y.o., male.      Procedures:       CYSTOSCOPY NEEDS FLUORO (N/A Abdomen) - 1 hour      URETHROGRAM, RETROGRADE (N/A )      INSERTION, SUPRAPUBIC CATHETER (N/A Abdomen)   Anesthesia type: General   Diagnosis:       Osteomyelitis of symphysis pubis [M86.9]      Acute prostatitis [N41.0]         Pre-operative evaluation for Procedure(s) (LRB):  CYSTOSCOPY NEEDS FLUORO (N/A)  URETHROGRAM, RETROGRADE (N/A)  INSERTION, SUPRAPUBIC CATHETER (N/A)    Encounter Diagnosis   Name Primary?    Recurrent UTI        Review of patient's allergies indicates:   Allergen Reactions    Niacin preparations Rash    Bactrim [sulfamethoxazole-trimethoprim] Hives and Itching       No current facility-administered medications on file prior to encounter.      Current Outpatient Medications on File Prior to Encounter   Medication Sig Dispense Refill    ascorbic acid, vitamin C, (VITAMIN C) 100 MG tablet Take 100 mg by mouth once daily.      aspirin (ECOTRIN) 81 MG EC tablet Take 81 mg by mouth once daily.      b complex vitamins tablet Take 1 tablet by mouth once daily.      beta carotene 41250 UNIT capsule Take 10,000 Units by mouth once daily.       cyanocobalamin (VITAMIN B-12) 1000 MCG tablet Take 1,000 mcg by mouth nightly.       ferrous sulfate 325 (65 FE) MG EC tablet Take 325 mg by mouth once daily.      folic acid (FOLVITE) 400 MCG tablet Take 400 mcg by mouth once daily.      GLEEVEC 400 mg Tab 400 mg once daily.       isosorbide mononitrate (IMDUR) 30 MG 24 hr tablet Take 30 mg by mouth once daily.      levothyroxine (SYNTHROID) 100 MCG tablet TAKE 1 TABLET EVERY DAY 90 tablet 3    losartan (COZAAR) 100 MG tablet Take 1 tablet (100 mg total) by mouth once daily. 30 tablet 0    lycopene 10 mg Cap Take 1 capsule by mouth nightly.       PRASTERONE, DHEA, (DHEA ORAL)  Take 25 mg by mouth once daily. 50 mg. tablet       RANEXA 1,000 mg Tb12 Take 500 mg by mouth 2 (two) times daily.       rosuvastatin (CRESTOR) 10 MG tablet Take 1 tablet (10 mg total) by mouth every evening. 30 tablet 5    traMADol (ULTRAM) 50 mg tablet TAKE 1 TABLET BY MOUTH EVERY 6 HOURS AS NEEDED FOR PAIN 60 tablet 0    vitamin D (VITAMIN D3) 1000 units Tab Take 2,000 Units by mouth once daily.      catheter (BARD RUBBER UTILITY CATHETER) 14 Fr Misc 1 Units by Misc.(Non-Drug; Combo Route) route every 7 days. 30 each 3    fish oil-omega-3 fatty acids 300-1,000 mg capsule Take by mouth once daily.      lidocaine HCL 2% (XYLOCAINE) 2 % jelly Apply topically as needed. 30 mL 11    meclizine (ANTIVERT) 25 mg tablet Take 1 tablet (25 mg total) by mouth 3 (three) times daily as needed. 30 tablet 0    NITROSTAT 0.4 mg SL tablet Place 0.4 mg under the tongue every 5 (five) minutes as needed.          Social History     Tobacco Use   Smoking Status Former Smoker    Packs/day: 1.00    Years: 25.00    Pack years: 25.00    Types: Cigarettes    Last attempt to quit: 1988    Years since quittin.7   Smokeless Tobacco Never Used       Social History     Substance and Sexual Activity   Alcohol Use No       Patient Active Problem List   Diagnosis    Incontinence    CA prostate, adenoca    Chronic urethral stricture    Scalp pain    Hypothyroidism due to acquired atrophy of thyroid    CML (chronic myelocytic leukemia)    Dyslipidemia    Coronary artery disease involving coronary bypass graft of native heart without angina pectoris    Drug-induced bradycardia    S/P placement of cardiac pacemaker    Acute cystitis without hematuria    Essential hypertension    Acute suprapubic pain    Weakness    Dizziness    Ataxia    Hx of radiation therapy    Osteomyelitis of symphysis pubis    Recurrent UTI       Past Surgical History:   Procedure Laterality Date    APPENDECTOMY      ATRIAL  CARDIAC PACEMAKER INSERTION  10/2018    CHOLECYSTECTOMY  1980    CORONARY ARTERY BYPASS GRAFT  1988    CORONARY STENT PLACEMENT  1990    CYSTOSCOPY      heart attack  6/17/2014    PROSTATE SURGERY      TURP    radiation      for ca prostate    TONSILLECTOMY      As child    TRANSURETHRAL RESECTION OF PROSTATE      x3    TRANSURETHRAL RESECTION OF PROSTATE      urethral dilation      VASECTOMY  1979           No results for input(s): HCT in the last 72 hours.  No results for input(s): PLT in the last 72 hours.  No results for input(s): K in the last 72 hours.  No results for input(s): CREATININE in the last 72 hours.  No results for input(s): GLU in the last 72 hours.  No results for input(s): PT in the last 72 hours.                    Anesthesia Evaluation         Review of Systems  Anesthesia Hx:  No problems with previous Anesthesia    Hematology/Oncology:        Hematology Comments: CML diagnosed 2006, no sequelae, in remission. Picked up on routine lab screening.  --  Cancer in past history:  Other (see Oncology comments) Oncology Comments: 2003 radiation for mild prostate cancer.       Cardiovascular:   Pacemaker (10/2018, for symptomatic bradycardia, doing well now.) Hypertension, well controlled Past MI (2015) CAD  asymptomatic CABG/stent (1987 bypass, 1999 stent, )   Denies Angina.    Pulmonary:  Pulmonary Normal  Denies COPD.  Denies Asthma.  Denies Shortness of breath. Asthma as child only, in remissiion   Renal/:   renal calculi (no sequelae.)    Hepatic/GI:   Denies Liver Disease.    Neurological:   Denies TIA. Denies CVA. Denies Seizures.    Endocrine:   Denies Diabetes.        Physical Exam  General:  Well nourished    Airway/Jaw/Neck:  Airway Findings: Mouth Opening: Normal Tongue: Normal  General Airway Assessment: Adult, Average  Mallampati: II  TM Distance: Normal, at least 6 cm  Jaw/Neck Findings:  Neck ROM: Normal ROM            Mental Status:  Mental Status Findings:  Cooperative,  Alert and Oriented         Anesthesia Plan  Type of Anesthesia, risks & benefits discussed:  Anesthesia Type:  general  Patient's Preference:   Intra-op Monitoring Plan:   Intra-op Monitoring Plan Comments:   Post Op Pain Control Plan:   Post Op Pain Control Plan Comments: As per surgeon's plan  Induction:   IV  Beta Blocker:  Patient is not currently on a Beta-Blocker (No further documentation required).       Informed Consent: Patient understands risks and agrees with Anesthesia plan.  Questions answered. Anesthesia consent signed with patient.  ASA Score: 3     Day of Surgery Review of History & Physical:    H&P update referred to the surgeon.         Ready For Surgery From Anesthesia Perspective.     Placement Date: 12/03/14; Placement Time: 0823; Inserted by: CRNA; Airway Device: LMA; Mask Ventilation: Easy; Intubated: Postinduction; Airway Device Size: 4.0; Cuff Inflation: Minimal occlusive pressure; Inflation Amount: 6; Placement Verified By: Auscultation, Capnometry; Complicating Factors: None; Intubation Findings: Positive EtCO2, Bilateral breath sounds, Atraumatic/Condition of teeth unchanged; Securment: Lips; Complications: None; Breath Sounds: Equal Bilateral; Insertion Attempts: 1; Removal Date: 12/03/14;  Removal Time: 0848    Atrial-paced rhythm  Left axis deviation  Right bundle branch block  Possible Lateral infarct ,age undetermined  Inferior infarct (cited on or before 07-OCT-2019)  Abnormal ECG  When compared with ECG of 07-OCT-2019 14:02,  Premature ventricular complexes are no longer Present  Borderline criteria for Lateral infarct are now Present    · Concentric left ventricular remodeling.  · Left ventricular systolic function. The estimated ejection fraction is 55%  · Grade I (mild) left ventricular diastolic dysfunction consistent with impaired relaxation.  · Normal right ventricular systolic function.  · No shunt with limited bubble study

## 2020-01-29 NOTE — PROGRESS NOTES
MD perea with not hanging Ampicillin 1g and OR to administer patient home dose of Rocephin IVPB along with gentamycin; understanding verbalized.

## 2020-01-29 NOTE — TRANSFER OF CARE
"Anesthesia Transfer of Care Note    Patient: James Quan Jr.    Procedure(s) Performed: Procedure(s) (LRB):  CYSTOSCOPY NEEDS FLUORO (N/A)  URETHROGRAM, RETROGRADE (N/A)    Patient location: PACU    Anesthesia Type: general    Transport from OR: Transported from OR on room air with adequate spontaneous ventilation    Post pain: adequate analgesia    Post assessment: no apparent anesthetic complications and tolerated procedure well    Post vital signs: stable    Level of consciousness: awake    Nausea/Vomiting: no nausea/vomiting    Complications: none    Transfer of care protocol was followed      Last vitals:   Visit Vitals  BP (!) 145/69 (BP Location: Left arm, Patient Position: Lying)   Pulse 86   Temp 36.7 °C (98 °F) (Oral)   Resp 18   Ht 5' 6" (1.676 m)   Wt 71.2 kg (157 lb)   SpO2 100%   BMI 25.34 kg/m²     "

## 2020-01-29 NOTE — ANESTHESIA POSTPROCEDURE EVALUATION
Anesthesia Post Evaluation    Patient: James Quan Jr.    Procedure(s) Performed: Procedure(s) (LRB):  CYSTOSCOPY NEEDS FLUORO (N/A)  URETHROGRAM, RETROGRADE (N/A)    Final Anesthesia Type: general    Patient location during evaluation: PACU  Patient participation: Yes- Able to Participate  Level of consciousness: awake and alert and oriented  Post-procedure vital signs: reviewed and stable  Pain management: adequate  Airway patency: patent    PONV status at discharge: No PONV  Anesthetic complications: no      Cardiovascular status: stable  Respiratory status: unassisted, spontaneous ventilation and room air  Hydration status: euvolemic  Follow-up not needed.          Vitals Value Taken Time   /65 1/29/2020  2:17 PM   Temp 36.7 °C (98.1 °F) 1/29/2020  2:14 PM   Pulse 64 1/29/2020  2:21 PM   Resp 16 1/29/2020  2:21 PM   SpO2 98 % 1/29/2020  2:21 PM   Vitals shown include unvalidated device data.      No case tracking events are documented in the log.      Pain/Corbin Score: Corbin Score: 10 (1/29/2020  2:15 PM)

## 2020-01-29 NOTE — DISCHARGE SUMMARY
[facility]  Discharge Note  Short Stay    Admit Date: 1/29/2020    Discharge Date and Time: 01/29/2020 2:09 PM      Attending Physician: Gilberto Wells MD     Discharge Provider: Racquel Villegas    Diagnoses:  Active Hospital Problems    Diagnosis  POA    *Recurrent UTI [N39.0]  Yes      Resolved Hospital Problems   No resolved problems to display.       Discharged Condition: good    Hospital Course: Patient was admitted for cystoscopy and retrograde urethrogram and tolerated the procedure well with no complications. The patient was discharged home in good condition on the same day.       Final Diagnoses: Same as principal problem.    Disposition: Home or Self Care    Follow up/Patient Instructions:    Medications:  Reconciled Home Medications:   Current Discharge Medication List      CONTINUE these medications which have NOT CHANGED    Details   ascorbic acid, vitamin C, (VITAMIN C) 100 MG tablet Take 100 mg by mouth once daily.      aspirin (ECOTRIN) 81 MG EC tablet Take 81 mg by mouth once daily.      b complex vitamins tablet Take 1 tablet by mouth once daily.      beta carotene 38093 UNIT capsule Take 10,000 Units by mouth once daily.       cyanocobalamin (VITAMIN B-12) 1000 MCG tablet Take 1,000 mcg by mouth nightly.       ferrous sulfate 325 (65 FE) MG EC tablet Take 325 mg by mouth once daily.      folic acid (FOLVITE) 400 MCG tablet Take 400 mcg by mouth once daily.      GLEEVEC 400 mg Tab 400 mg once daily.       isosorbide mononitrate (IMDUR) 30 MG 24 hr tablet Take 30 mg by mouth once daily.      levothyroxine (SYNTHROID) 100 MCG tablet TAKE 1 TABLET EVERY DAY  Qty: 90 tablet, Refills: 3    Associated Diagnoses: Hypothyroidism, unspecified type      losartan (COZAAR) 100 MG tablet Take 1 tablet (100 mg total) by mouth once daily.  Qty: 30 tablet, Refills: 0      lycopene 10 mg Cap Take 1 capsule by mouth nightly.       PRASTERONE, DHEA, (DHEA ORAL) Take 25 mg by mouth once daily. 50 mg.  tablet       RANEXA 1,000 mg Tb12 Take 500 mg by mouth 2 (two) times daily.       rosuvastatin (CRESTOR) 10 MG tablet Take 1 tablet (10 mg total) by mouth every evening.  Qty: 30 tablet, Refills: 5      traMADol (ULTRAM) 50 mg tablet TAKE 1 TABLET BY MOUTH EVERY 6 HOURS AS NEEDED FOR PAIN  Qty: 60 tablet, Refills: 0    Associated Diagnoses: Chronic bladder pain      vitamin D (VITAMIN D3) 1000 units Tab Take 2,000 Units by mouth once daily.      catheter (BARD RUBBER UTILITY CATHETER) 14 Fr Misc 1 Units by Misc.(Non-Drug; Combo Route) route every 7 days.  Qty: 30 each, Refills: 3    Associated Diagnoses: Urethral stricture      fish oil-omega-3 fatty acids 300-1,000 mg capsule Take by mouth once daily.      lidocaine HCL 2% (XYLOCAINE) 2 % jelly Apply topically as needed.  Qty: 30 mL, Refills: 11      meclizine (ANTIVERT) 25 mg tablet Take 1 tablet (25 mg total) by mouth 3 (three) times daily as needed.  Qty: 30 tablet, Refills: 0      NITROSTAT 0.4 mg SL tablet Place 0.4 mg under the tongue every 5 (five) minutes as needed.            Discharge Procedure Orders   Notify your health care provider if you experience any of the following:  temperature >100.4     Notify your health care provider if you experience any of the following:  persistent nausea and vomiting or diarrhea     Notify your health care provider if you experience any of the following:  severe uncontrolled pain     Notify your health care provider if you experience any of the following:  redness, tenderness, or signs of infection (pain, swelling, redness, odor or green/yellow discharge around incision site)     Activity as tolerated     Follow-up Information     [facility] 4th Floor In 2 weeks.    Specialty:  Urology  Why:  catheter exchange  Contact information:  [facility]  [facility] [address] [phone]  Additional information:  [facility] 4th Floor                 Discharge Procedure Orders (must include Diet,  Follow-up, Activity):   Discharge Procedure Orders (must include Diet, Follow-up, Activity)   Notify your health care provider if you experience any of the following:  temperature >100.4     Notify your health care provider if you experience any of the following:  persistent nausea and vomiting or diarrhea     Notify your health care provider if you experience any of the following:  severe uncontrolled pain     Notify your health care provider if you experience any of the following:  redness, tenderness, or signs of infection (pain, swelling, redness, odor or green/yellow discharge around incision site)     Activity as tolerated

## 2020-01-29 NOTE — PROGRESS NOTES
Patient resting in bed, no distressnoted, denies pain at present. Saldana catheter noted , draining to leg bag. Lumen 1 of right upper arm double lumen PICC line flushed and connected to NS. Wife at bedside, collected patient's wedding band, hearing aids and glasses. Patient brought IV Rocephin from home, asking to be infused now. Medication returned to wife, patient due to receive ordered antibiotics pre-op. Several drops of urine collected from Saldana catheter using sterile technique. Report given to SHERRY Milan.

## 2020-01-29 NOTE — DISCHARGE INSTRUCTIONS
Home Care Instructions  SELF-CARE OF  INDWELLING CATHETERS    PATIENT EDUCATION:  1. Wash hands before and after handling the catheter.  2. Wash around urinary opening daily, taking care to avoid pulling on the catheter during cleansing.  3. Drink 8-12 glasses of fluids daily; increase fluid intake if urine becomes dark and concentrated.  4. Wipe all connecting junctions with alcohol or betadine before changing from leg-bag drainage to  overnight bag drainage.  5. Keep the drainage bag at a lower level than the bladder; do not place the bag on your chair.  6. Avoid letting the bag lay on its side as urine may flow back into the drainage tube.  7. Usually the catheter is not changed except when blocked or a malfunction occurs.  **MALE**  If you are uncircumcised, pull skin back over glans (head) of penis. Cleanse from the catheter outward to  include entire glans. After cleansing return foreskin to its normal position. Continue with cleansing the rest  of your genitalia, without returning to this glans area.  BATHING: You may take a shower. Do not soak in a tub.  FOR EMERGENCIES:  If any unusual problems or difficulties occur, contact Dr. Wells  or the resident at [phone] (page ) or at the Clinic office.

## 2020-01-29 NOTE — PLAN OF CARE
Discharge instructions  given to patient and spouse. Verbalized understanding. Patient stable, tolerating fluids. No complaints at this time. Patient adequate for discharge.

## 2020-01-29 NOTE — PROGRESS NOTES
Dr. Wells at bedside explaining results of procedure to patient and spouse. All questions answered.

## 2020-01-29 NOTE — PROGRESS NOTES
Patient arrived to [facility] from OR. Report received from Dr. Villegas, urology resident. MD stated patient to go home with adamson catheter.

## 2020-01-30 PROCEDURE — G0180 PR HOME HEALTH MD CERTIFICATION: ICD-10-PCS | Mod: ,,, | Performed by: FAMILY MEDICINE

## 2020-01-30 PROCEDURE — G0180 MD CERTIFICATION HHA PATIENT: HCPCS | Mod: ,,, | Performed by: FAMILY MEDICINE

## 2020-01-31 ENCOUNTER — PATIENT MESSAGE (OUTPATIENT)
Dept: UROLOGY | Facility: CLINIC | Age: 75
End: 2020-01-31

## 2020-02-03 ENCOUNTER — TELEPHONE (OUTPATIENT)
Dept: UROLOGY | Facility: CLINIC | Age: 75
End: 2020-02-03

## 2020-02-03 DIAGNOSIS — N32.89 BLADDER SPASM: Primary | ICD-10-CM

## 2020-02-03 RX ORDER — OXYBUTYNIN CHLORIDE 10 MG/1
10 TABLET, EXTENDED RELEASE ORAL DAILY
Qty: 30 TABLET | Refills: 11 | Status: SHIPPED | OUTPATIENT
Start: 2020-02-03 | End: 2020-11-27 | Stop reason: ALTCHOICE

## 2020-02-03 NOTE — TELEPHONE ENCOUNTER
As long as your urine is draining well thru the catheter, I will try OAB ( over active bladder medication) for now.   Will send it to your pharmacy.   You have an appt to change the catheter on 2/12/20.   If  Your problems continues, we may change the catheter earlier.     Dr. Wells     Bladder spasm  -     oxybutynin (DITROPAN-XL) 10 MG 24 hr tablet; Take 1 tablet (10 mg total) by mouth once daily.  Dispense: 30 tablet; Refill: 11    eRxed to the pharmacy.  Please call and advise the patient to take the medication as given.

## 2020-02-04 ENCOUNTER — LAB VISIT (OUTPATIENT)
Dept: LAB | Facility: HOSPITAL | Age: 75
End: 2020-02-04
Attending: FAMILY MEDICINE
Payer: MEDICARE

## 2020-02-04 DIAGNOSIS — M86.18 ACUTE OSTEOMYELITIS OF STERNUM: Primary | ICD-10-CM

## 2020-02-04 DIAGNOSIS — C61 MALIGNANT NEOPLASM OF PROSTATE: ICD-10-CM

## 2020-02-04 LAB
ALBUMIN SERPL BCP-MCNC: 3.5 G/DL (ref 3.5–5.2)
ALP SERPL-CCNC: 81 U/L (ref 55–135)
ALT SERPL W/O P-5'-P-CCNC: 21 U/L (ref 10–44)
ANION GAP SERPL CALC-SCNC: 8 MMOL/L (ref 8–16)
AST SERPL-CCNC: 22 U/L (ref 10–40)
BASOPHILS # BLD AUTO: 0.06 K/UL (ref 0–0.2)
BASOPHILS NFR BLD: 0.7 % (ref 0–1.9)
BILIRUB SERPL-MCNC: 0.4 MG/DL (ref 0.1–1)
BUN SERPL-MCNC: 12 MG/DL (ref 8–23)
CALCIUM SERPL-MCNC: 8.8 MG/DL (ref 8.7–10.5)
CHLORIDE SERPL-SCNC: 107 MMOL/L (ref 95–110)
CO2 SERPL-SCNC: 27 MMOL/L (ref 23–29)
CREAT SERPL-MCNC: 1.1 MG/DL (ref 0.5–1.4)
DIFFERENTIAL METHOD: ABNORMAL
EOSINOPHIL # BLD AUTO: 0.4 K/UL (ref 0–0.5)
EOSINOPHIL NFR BLD: 4.3 % (ref 0–8)
ERYTHROCYTE [DISTWIDTH] IN BLOOD BY AUTOMATED COUNT: 15.9 % (ref 11.5–14.5)
ERYTHROCYTE [SEDIMENTATION RATE] IN BLOOD BY WESTERGREN METHOD: 27 MM/HR (ref 0–10)
EST. GFR  (AFRICAN AMERICAN): >60 ML/MIN/1.73 M^2
EST. GFR  (NON AFRICAN AMERICAN): >60 ML/MIN/1.73 M^2
GLUCOSE SERPL-MCNC: 96 MG/DL (ref 70–110)
HCT VFR BLD AUTO: 34.2 % (ref 40–54)
HGB BLD-MCNC: 11.5 G/DL (ref 14–18)
IMM GRANULOCYTES # BLD AUTO: 0.02 K/UL (ref 0–0.04)
IMM GRANULOCYTES NFR BLD AUTO: 0.2 % (ref 0–0.5)
LYMPHOCYTES # BLD AUTO: 1.7 K/UL (ref 1–4.8)
LYMPHOCYTES NFR BLD: 19.4 % (ref 18–48)
MCH RBC QN AUTO: 33.3 PG (ref 27–31)
MCHC RBC AUTO-ENTMCNC: 33.6 G/DL (ref 32–36)
MCV RBC AUTO: 99 FL (ref 82–98)
MONOCYTES # BLD AUTO: 0.8 K/UL (ref 0.3–1)
MONOCYTES NFR BLD: 8.4 % (ref 4–15)
NEUTROPHILS # BLD AUTO: 6 K/UL (ref 1.8–7.7)
NEUTROPHILS NFR BLD: 67 % (ref 38–73)
NRBC BLD-RTO: 0 /100 WBC
PLATELET # BLD AUTO: 319 K/UL (ref 150–350)
PMV BLD AUTO: 10 FL (ref 9.2–12.9)
POTASSIUM SERPL-SCNC: 3.8 MMOL/L (ref 3.5–5.1)
PROT SERPL-MCNC: 6.7 G/DL (ref 6–8.4)
RBC # BLD AUTO: 3.45 M/UL (ref 4.6–6.2)
SODIUM SERPL-SCNC: 142 MMOL/L (ref 136–145)
WBC # BLD AUTO: 8.91 K/UL (ref 3.9–12.7)

## 2020-02-04 PROCEDURE — 86140 C-REACTIVE PROTEIN: CPT | Mod: HCNC

## 2020-02-04 PROCEDURE — 85651 RBC SED RATE NONAUTOMATED: CPT | Mod: HCNC

## 2020-02-04 PROCEDURE — 85025 COMPLETE CBC W/AUTO DIFF WBC: CPT | Mod: HCNC

## 2020-02-04 PROCEDURE — 80053 COMPREHEN METABOLIC PANEL: CPT | Mod: HCNC

## 2020-02-05 LAB — CRP SERPL-MCNC: 2 MG/L (ref 0–8.2)

## 2020-02-11 ENCOUNTER — LAB VISIT (OUTPATIENT)
Dept: LAB | Facility: HOSPITAL | Age: 75
End: 2020-02-11
Attending: FAMILY MEDICINE
Payer: MEDICARE

## 2020-02-11 DIAGNOSIS — C61 MALIGNANT NEOPLASM OF PROSTATE: ICD-10-CM

## 2020-02-11 DIAGNOSIS — M86.9 OSTEOMYELITIS: Primary | ICD-10-CM

## 2020-02-11 LAB
ALBUMIN SERPL BCP-MCNC: 3.6 G/DL (ref 3.5–5.2)
ALP SERPL-CCNC: 77 U/L (ref 55–135)
ALT SERPL W/O P-5'-P-CCNC: 23 U/L (ref 10–44)
ANION GAP SERPL CALC-SCNC: 8 MMOL/L (ref 8–16)
AST SERPL-CCNC: 23 U/L (ref 10–40)
BASOPHILS # BLD AUTO: 0.05 K/UL (ref 0–0.2)
BASOPHILS NFR BLD: 0.8 % (ref 0–1.9)
BILIRUB SERPL-MCNC: 0.3 MG/DL (ref 0.1–1)
BUN SERPL-MCNC: 11 MG/DL (ref 8–23)
CALCIUM SERPL-MCNC: 8.4 MG/DL (ref 8.7–10.5)
CHLORIDE SERPL-SCNC: 108 MMOL/L (ref 95–110)
CO2 SERPL-SCNC: 26 MMOL/L (ref 23–29)
CREAT SERPL-MCNC: 1 MG/DL (ref 0.5–1.4)
CRP SERPL-MCNC: 1.1 MG/L (ref 0–8.2)
DIFFERENTIAL METHOD: ABNORMAL
EOSINOPHIL # BLD AUTO: 0.3 K/UL (ref 0–0.5)
EOSINOPHIL NFR BLD: 4.3 % (ref 0–8)
ERYTHROCYTE [DISTWIDTH] IN BLOOD BY AUTOMATED COUNT: 16 % (ref 11.5–14.5)
ERYTHROCYTE [SEDIMENTATION RATE] IN BLOOD BY WESTERGREN METHOD: 15 MM/HR (ref 0–10)
EST. GFR  (AFRICAN AMERICAN): >60 ML/MIN/1.73 M^2
EST. GFR  (NON AFRICAN AMERICAN): >60 ML/MIN/1.73 M^2
GLUCOSE SERPL-MCNC: 119 MG/DL (ref 70–110)
HCT VFR BLD AUTO: 35 % (ref 40–54)
HGB BLD-MCNC: 11.8 G/DL (ref 14–18)
IMM GRANULOCYTES # BLD AUTO: 0.01 K/UL (ref 0–0.04)
IMM GRANULOCYTES NFR BLD AUTO: 0.2 % (ref 0–0.5)
LYMPHOCYTES # BLD AUTO: 1.7 K/UL (ref 1–4.8)
LYMPHOCYTES NFR BLD: 26.2 % (ref 18–48)
MCH RBC QN AUTO: 33.5 PG (ref 27–31)
MCHC RBC AUTO-ENTMCNC: 33.7 G/DL (ref 32–36)
MCV RBC AUTO: 99 FL (ref 82–98)
MONOCYTES # BLD AUTO: 0.5 K/UL (ref 0.3–1)
MONOCYTES NFR BLD: 8.1 % (ref 4–15)
NEUTROPHILS # BLD AUTO: 3.8 K/UL (ref 1.8–7.7)
NEUTROPHILS NFR BLD: 60.4 % (ref 38–73)
NRBC BLD-RTO: 0 /100 WBC
PLATELET # BLD AUTO: 318 K/UL (ref 150–350)
PMV BLD AUTO: 10.2 FL (ref 9.2–12.9)
POTASSIUM SERPL-SCNC: 4 MMOL/L (ref 3.5–5.1)
PROT SERPL-MCNC: 6.6 G/DL (ref 6–8.4)
RBC # BLD AUTO: 3.52 M/UL (ref 4.6–6.2)
SODIUM SERPL-SCNC: 142 MMOL/L (ref 136–145)
WBC # BLD AUTO: 6.33 K/UL (ref 3.9–12.7)

## 2020-02-11 PROCEDURE — 85025 COMPLETE CBC W/AUTO DIFF WBC: CPT | Mod: HCNC

## 2020-02-11 PROCEDURE — 85651 RBC SED RATE NONAUTOMATED: CPT | Mod: HCNC

## 2020-02-11 PROCEDURE — 86140 C-REACTIVE PROTEIN: CPT | Mod: HCNC

## 2020-02-11 PROCEDURE — 80053 COMPREHEN METABOLIC PANEL: CPT | Mod: HCNC

## 2020-02-12 ENCOUNTER — OFFICE VISIT (OUTPATIENT)
Dept: UROLOGY | Facility: CLINIC | Age: 75
End: 2020-02-12
Payer: MEDICARE

## 2020-02-12 VITALS
HEIGHT: 66 IN | DIASTOLIC BLOOD PRESSURE: 72 MMHG | HEART RATE: 83 BPM | BODY MASS INDEX: 26.2 KG/M2 | SYSTOLIC BLOOD PRESSURE: 115 MMHG | WEIGHT: 163 LBS

## 2020-02-12 DIAGNOSIS — Z46.6 URINARY CATHETER (FOLEY) CHANGE REQUIRED: Primary | ICD-10-CM

## 2020-02-12 PROCEDURE — 99999 PR PBB SHADOW E&M-EST. PATIENT-LVL IV: CPT | Mod: PBBFAC,HCNC,, | Performed by: PHYSICIAN ASSISTANT

## 2020-02-12 PROCEDURE — 1101F PR PT FALLS ASSESS DOC 0-1 FALLS W/OUT INJ PAST YR: ICD-10-PCS | Mod: HCNC,CPTII,S$GLB, | Performed by: PHYSICIAN ASSISTANT

## 2020-02-12 PROCEDURE — 51702 PR INSERTION OF TEMPORARY INDWELLING BLADDER CATHETER, SIMPLE: ICD-10-PCS | Mod: HCNC,S$GLB,, | Performed by: PHYSICIAN ASSISTANT

## 2020-02-12 PROCEDURE — 51702 INSERT TEMP BLADDER CATH: CPT | Mod: HCNC,S$GLB,, | Performed by: PHYSICIAN ASSISTANT

## 2020-02-12 PROCEDURE — 99499 UNLISTED E&M SERVICE: CPT | Mod: HCNC,S$GLB,, | Performed by: PHYSICIAN ASSISTANT

## 2020-02-12 PROCEDURE — 1126F AMNT PAIN NOTED NONE PRSNT: CPT | Mod: HCNC,S$GLB,, | Performed by: PHYSICIAN ASSISTANT

## 2020-02-12 PROCEDURE — 99999 PR PBB SHADOW E&M-EST. PATIENT-LVL IV: ICD-10-PCS | Mod: PBBFAC,HCNC,, | Performed by: PHYSICIAN ASSISTANT

## 2020-02-12 PROCEDURE — 99213 OFFICE O/P EST LOW 20 MIN: CPT | Mod: 25,HCNC,S$GLB, | Performed by: PHYSICIAN ASSISTANT

## 2020-02-12 PROCEDURE — 99499 RISK ADDL DX/OHS AUDIT: ICD-10-PCS | Mod: HCNC,S$GLB,, | Performed by: PHYSICIAN ASSISTANT

## 2020-02-12 PROCEDURE — 3078F PR MOST RECENT DIASTOLIC BLOOD PRESSURE < 80 MM HG: ICD-10-PCS | Mod: HCNC,CPTII,S$GLB, | Performed by: PHYSICIAN ASSISTANT

## 2020-02-12 PROCEDURE — 99213 PR OFFICE/OUTPT VISIT, EST, LEVL III, 20-29 MIN: ICD-10-PCS | Mod: 25,HCNC,S$GLB, | Performed by: PHYSICIAN ASSISTANT

## 2020-02-12 PROCEDURE — 3078F DIAST BP <80 MM HG: CPT | Mod: HCNC,CPTII,S$GLB, | Performed by: PHYSICIAN ASSISTANT

## 2020-02-12 PROCEDURE — 1159F PR MEDICATION LIST DOCUMENTED IN MEDICAL RECORD: ICD-10-PCS | Mod: HCNC,S$GLB,, | Performed by: PHYSICIAN ASSISTANT

## 2020-02-12 PROCEDURE — 1101F PT FALLS ASSESS-DOCD LE1/YR: CPT | Mod: HCNC,CPTII,S$GLB, | Performed by: PHYSICIAN ASSISTANT

## 2020-02-12 PROCEDURE — 3074F PR MOST RECENT SYSTOLIC BLOOD PRESSURE < 130 MM HG: ICD-10-PCS | Mod: HCNC,CPTII,S$GLB, | Performed by: PHYSICIAN ASSISTANT

## 2020-02-12 PROCEDURE — 1126F PR PAIN SEVERITY QUANTIFIED, NO PAIN PRESENT: ICD-10-PCS | Mod: HCNC,S$GLB,, | Performed by: PHYSICIAN ASSISTANT

## 2020-02-12 PROCEDURE — 1159F MED LIST DOCD IN RCRD: CPT | Mod: HCNC,S$GLB,, | Performed by: PHYSICIAN ASSISTANT

## 2020-02-12 PROCEDURE — 3074F SYST BP LT 130 MM HG: CPT | Mod: HCNC,CPTII,S$GLB, | Performed by: PHYSICIAN ASSISTANT

## 2020-02-12 RX ORDER — CEFTRIAXONE 2 G/1
INJECTION, POWDER, FOR SOLUTION INTRAMUSCULAR; INTRAVENOUS
COMMUNITY
Start: 2020-02-03 | End: 2020-05-04 | Stop reason: ALTCHOICE

## 2020-02-12 NOTE — PROGRESS NOTES
CHIEF COMPLAINT:    Mr. Quan is a 74 y.o. male presenting for catheter change  PRESENTING ILLNESS:    James Quan Jr. is a 74 y.o. male with a PMH of prostate cancer s/p radiation who presents for a catheter change.    Patient with a history of incontinence, using a condom catheter.  He had a catheter placed on 1/21/20 for the following MRI findings:  MRI 1/14/20  1. 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  2. Insufficiency fracture of the right sacral ala.  3. Myositis of the left greater than right obturator and adductor musculature.    The followed up on 1/29/20 for the following procedure:  Procedure(s) Performed:   1.  Cystoscopy   2.  Retrograde urethrogram    Staff Surgeon: Gilberto Wells MD   Indications: James Quan Jr. is a 74 y.o. 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    His catheter has been draining ok. He reports urine leaking around the catheter.    He has been taking oxybutynin 5mg at night.  It helps some.  He did not fill the oxybutynin 10mg since he already had 5mg at home.   He is to get his catheter changed every 2 weeks.   He got a PICC line and scheduled for Rocephin 2 gm IV for 42 days.  REVIEW OF SYSTEMS:    Constitutional: Negative for fever and chills.   HENT: Negative for hearing loss.   Eyes: Negative for visual disturbance.    Respiratory: Negative for shortness of breath.   Cardiovascular: Negative for chest pain.   Gastrointestinal: Negative for vomiting, and constipation.   Genitourinary: See HPI  Neurological: Negative for dizziness.   Hematological: Does not bruise/bleed easily.   Psychiatric/Behavioral: Negative for confusion.     PATIENT HISTORY:    Past Medical History:   Diagnosis Date    Anemia     Anticoagulant long-term use     Aplasia bone marrow     Asthma     CA of prostate     CML (chronic myeloid leukemia)     Coronary artery disease     Heart attack 6/17/2014    Heart failure     Hyperlipidemia     Hypertension     Hypothyroidism     Kidney stones     Prostate cancer     Thyroid disease     Urinary incontinence        Past Surgical History:   Procedure Laterality Date    APPENDECTOMY  1966    ATRIAL CARDIAC PACEMAKER INSERTION  10/2018    CHOLECYSTECTOMY  1980    CORONARY ARTERY BYPASS GRAFT  1988    CORONARY STENT PLACEMENT  1990    CYSTOSCOPY      CYSTOSCOPY N/A 1/29/2020    Procedure: CYSTOSCOPY NEEDS FLUORO;  Surgeon: Gilberto Wells MD;  Location: [address];  Service: Urology;  Laterality: N/A;  1 hour    heart attack  6/17/2014    PERIPHERALLY INSERTED CENTRAL CATHETER INSERTION N/A 1/27/2020    Procedure: INSERTION, PICC;  Surgeon: Rosalba Diagnostic Provider;  Location: [facility];  Service: General;  Laterality: N/A;    PROSTATE SURGERY      TURP    radiation      for ca prostate    TONSILLECTOMY      As child    TRANSURETHRAL RESECTION OF PROSTATE      x3    TRANSURETHRAL RESECTION OF PROSTATE      urethral dilation      VASECTOMY  1979       Family History   Problem Relation Age of Onset    Diabetes Father        Social History     Socioeconomic History    Marital status:      Spouse name: Not on file    Number of children: Not on file    Years of education: Not on file    Highest education level: Not on file   Occupational History    Not on file   Social Needs     Financial resource strain: Not on file    Food insecurity:     Worry: Not on file     Inability: Not on file    Transportation needs:     Medical: Not on file     Non-medical: Not on file   Tobacco Use    Smoking status: Former Smoker     Packs/day: 1.00     Years: 25.00     Pack years: 25.00     Types: Cigarettes     Last attempt to quit: 1988     Years since quittin.8    Smokeless tobacco: Never Used   Substance and Sexual Activity    Alcohol use: No    Drug use: No    Sexual activity: Yes     Partners: Female   Lifestyle    Physical activity:     Days per week: Not on file     Minutes per session: Not on file    Stress: Not on file   Relationships    Social connections:     Talks on phone: Not on file     Gets together: Not on file     Attends Hinduism service: Not on file     Active member of club or organization: Not on file     Attends meetings of clubs or organizations: Not on file     Relationship status: Not on file   Other Topics Concern    Not on file   Social History Narrative    Not on file       Allergies:  Niacin preparations and Bactrim [sulfamethoxazole-trimethoprim]    Medications:    Current Outpatient Medications:     ascorbic acid, vitamin C, (VITAMIN C) 100 MG tablet, Take 100 mg by mouth once daily., Disp: , Rfl:     aspirin (ECOTRIN) 81 MG EC tablet, Take 81 mg by mouth once daily., Disp: , Rfl:     b complex vitamins tablet, Take 1 tablet by mouth once daily., Disp: , Rfl:     beta carotene 52586 UNIT capsule, Take 10,000 Units by mouth once daily. , Disp: , Rfl:     catheter (BARD RUBBER UTILITY CATHETER) 14 Fr Misc, 1 Units by Misc.(Non-Drug; Combo Route) route every 7 days., Disp: 30 each, Rfl: 3    cefTRIAXone (ROCEPHIN) 2 gram injection, , Disp: , Rfl:     cyanocobalamin (VITAMIN B-12) 1000 MCG tablet, Take 1,000 mcg by mouth nightly. , Disp: , Rfl:     ferrous sulfate 325 (65 FE) MG EC tablet, Take 325 mg by mouth once daily., Disp: , Rfl:     fish  oil-omega-3 fatty acids 300-1,000 mg capsule, Take by mouth once daily., Disp: , Rfl:     folic acid (FOLVITE) 400 MCG tablet, Take 400 mcg by mouth once daily., Disp: , Rfl:     GLEEVEC 400 mg Tab, 400 mg once daily. , Disp: , Rfl:     isosorbide mononitrate (IMDUR) 30 MG 24 hr tablet, Take 30 mg by mouth once daily., Disp: , Rfl:     levothyroxine (SYNTHROID) 100 MCG tablet, TAKE 1 TABLET EVERY DAY, Disp: 90 tablet, Rfl: 3    lidocaine HCL 2% (XYLOCAINE) 2 % jelly, Apply topically as needed., Disp: 30 mL, Rfl: 11    losartan (COZAAR) 100 MG tablet, Take 1 tablet (100 mg total) by mouth once daily., Disp: 30 tablet, Rfl: 0    lycopene 10 mg Cap, Take 1 capsule by mouth nightly. , Disp: , Rfl:     meclizine (ANTIVERT) 25 mg tablet, Take 1 tablet (25 mg total) by mouth 3 (three) times daily as needed., Disp: 30 tablet, Rfl: 0    NITROSTAT 0.4 mg SL tablet, Place 0.4 mg under the tongue every 5 (five) minutes as needed. , Disp: , Rfl:     oxybutynin (DITROPAN-XL) 10 MG 24 hr tablet, Take 1 tablet (10 mg total) by mouth once daily., Disp: 30 tablet, Rfl: 11    PRASTERONE, DHEA, (DHEA ORAL), Take 25 mg by mouth once daily. 50 mg. tablet , Disp: , Rfl:     RANEXA 1,000 mg Tb12, Take 500 mg by mouth 2 (two) times daily. , Disp: , Rfl:     rosuvastatin (CRESTOR) 10 MG tablet, Take 1 tablet (10 mg total) by mouth every evening., Disp: 30 tablet, Rfl: 5    traMADol (ULTRAM) 50 mg tablet, TAKE 1 TABLET BY MOUTH EVERY 6 HOURS AS NEEDED FOR PAIN, Disp: 60 tablet, Rfl: 0    vitamin D (VITAMIN D3) 1000 units Tab, Take 2,000 Units by mouth once daily., Disp: , Rfl:     PHYSICAL EXAMINATION:    Constitutional: He appears well-developed and well-nourished.  He is in no apparent distress.    Eyes: No scleral icterus noted bilaterally. No discharge bilaterally.    Nose: No rhinorrhea    Cardiovascular: Normal rate.  No pitting edema noted in lower extremities bilaterally    Pulmonary/Chest: Effort normal. No respiratory  distress.     Abdominal:  He exhibits no distension.    Neurological: He is alert and oriented to person, place, and time.     Skin: Skin is warm and dry.     Psych: Cooperative with normal affect.      Physical Exam      LABS:    Lab Results   Component Value Date    PSA 0.37 04/23/2014    PSADIAG 0.44 12/06/2019    PSADIAG 0.82 11/07/2016    PSADIAG 0.50 06/19/2015       IMPRESSION:    Encounter Diagnoses   Name Primary?    Urinary catheter (Saldana) change required Yes         PLAN:  Catheter placed by Nurse Dottie.    The penis was prepped using betadine swaps.  2% lidocaine jelly was injected into the urethra as a local anesthetics.  A new 16 F catheter was inserted into the bladder in a sterile fashion without any difficulty.  A return of urine was confirmed and the catheter balloon was inflated with 15 cc water.  (patient requested to have the balloon inflated more given urinary incontinence.  I explained to patient that this will not improve his incontinence.  However, I placed at total of 15cc in balloon as opposed to 10cc).   The catheter was secured with a catheter montesinos and was connected to a leg bag.    He tolerated a procedure well.    Recommend he continue oxybutynin for bladder spasms.      Follow up in 2 weeks for next catheter change.      Lisa Aguilar PA-C

## 2020-02-18 ENCOUNTER — LAB VISIT (OUTPATIENT)
Dept: LAB | Facility: HOSPITAL | Age: 75
End: 2020-02-18
Attending: FAMILY MEDICINE
Payer: MEDICARE

## 2020-02-18 DIAGNOSIS — C61 MALIGNANT NEOPLASM OF PROSTATE: ICD-10-CM

## 2020-02-18 DIAGNOSIS — M86.18 OTHER ACUTE OSTEOMYELITIS, OTHER SITE: Primary | ICD-10-CM

## 2020-02-18 LAB
ALBUMIN SERPL BCP-MCNC: 3.8 G/DL (ref 3.5–5.2)
ALP SERPL-CCNC: 72 U/L (ref 55–135)
ALT SERPL W/O P-5'-P-CCNC: 27 U/L (ref 10–44)
ANION GAP SERPL CALC-SCNC: 7 MMOL/L (ref 8–16)
AST SERPL-CCNC: 26 U/L (ref 10–40)
BASOPHILS # BLD AUTO: 0.04 K/UL (ref 0–0.2)
BASOPHILS NFR BLD: 0.6 % (ref 0–1.9)
BILIRUB SERPL-MCNC: 0.3 MG/DL (ref 0.1–1)
BUN SERPL-MCNC: 15 MG/DL (ref 8–23)
CALCIUM SERPL-MCNC: 8.8 MG/DL (ref 8.7–10.5)
CHLORIDE SERPL-SCNC: 108 MMOL/L (ref 95–110)
CO2 SERPL-SCNC: 26 MMOL/L (ref 23–29)
CREAT SERPL-MCNC: 1 MG/DL (ref 0.5–1.4)
CRP SERPL-MCNC: 1.3 MG/L (ref 0–8.2)
DIFFERENTIAL METHOD: ABNORMAL
EOSINOPHIL # BLD AUTO: 0.3 K/UL (ref 0–0.5)
EOSINOPHIL NFR BLD: 4.5 % (ref 0–8)
ERYTHROCYTE [DISTWIDTH] IN BLOOD BY AUTOMATED COUNT: 15.7 % (ref 11.5–14.5)
ERYTHROCYTE [SEDIMENTATION RATE] IN BLOOD BY WESTERGREN METHOD: 18 MM/HR (ref 0–10)
EST. GFR  (AFRICAN AMERICAN): >60 ML/MIN/1.73 M^2
EST. GFR  (NON AFRICAN AMERICAN): >60 ML/MIN/1.73 M^2
GLUCOSE SERPL-MCNC: 103 MG/DL (ref 70–110)
HCT VFR BLD AUTO: 35.5 % (ref 40–54)
HGB BLD-MCNC: 11.9 G/DL (ref 14–18)
IMM GRANULOCYTES # BLD AUTO: 0.02 K/UL (ref 0–0.04)
IMM GRANULOCYTES NFR BLD AUTO: 0.3 % (ref 0–0.5)
LYMPHOCYTES # BLD AUTO: 1.6 K/UL (ref 1–4.8)
LYMPHOCYTES NFR BLD: 26 % (ref 18–48)
MCH RBC QN AUTO: 33.1 PG (ref 27–31)
MCHC RBC AUTO-ENTMCNC: 33.5 G/DL (ref 32–36)
MCV RBC AUTO: 99 FL (ref 82–98)
MONOCYTES # BLD AUTO: 0.6 K/UL (ref 0.3–1)
MONOCYTES NFR BLD: 10.2 % (ref 4–15)
NEUTROPHILS # BLD AUTO: 3.6 K/UL (ref 1.8–7.7)
NEUTROPHILS NFR BLD: 58.4 % (ref 38–73)
NRBC BLD-RTO: 0 /100 WBC
PLATELET # BLD AUTO: 277 K/UL (ref 150–350)
PMV BLD AUTO: 10.1 FL (ref 9.2–12.9)
POTASSIUM SERPL-SCNC: 3.9 MMOL/L (ref 3.5–5.1)
PROT SERPL-MCNC: 6.8 G/DL (ref 6–8.4)
RBC # BLD AUTO: 3.6 M/UL (ref 4.6–6.2)
SODIUM SERPL-SCNC: 141 MMOL/L (ref 136–145)
WBC # BLD AUTO: 6.16 K/UL (ref 3.9–12.7)

## 2020-02-18 PROCEDURE — 85651 RBC SED RATE NONAUTOMATED: CPT | Mod: HCNC

## 2020-02-18 PROCEDURE — 80053 COMPREHEN METABOLIC PANEL: CPT | Mod: HCNC

## 2020-02-18 PROCEDURE — 86140 C-REACTIVE PROTEIN: CPT | Mod: HCNC

## 2020-02-18 PROCEDURE — 85025 COMPLETE CBC W/AUTO DIFF WBC: CPT | Mod: HCNC

## 2020-02-25 ENCOUNTER — LAB VISIT (OUTPATIENT)
Dept: LAB | Facility: HOSPITAL | Age: 75
End: 2020-02-25
Attending: FAMILY MEDICINE
Payer: MEDICARE

## 2020-02-25 DIAGNOSIS — C61 MALIGNANT NEOPLASM OF PROSTATE: ICD-10-CM

## 2020-02-25 DIAGNOSIS — M86.18 OTHER ACUTE OSTEOMYELITIS, OTHER SITE: Primary | ICD-10-CM

## 2020-02-25 LAB
ALBUMIN SERPL BCP-MCNC: 3.8 G/DL (ref 3.5–5.2)
ALP SERPL-CCNC: 75 U/L (ref 55–135)
ALT SERPL W/O P-5'-P-CCNC: 28 U/L (ref 10–44)
ANION GAP SERPL CALC-SCNC: 10 MMOL/L (ref 8–16)
AST SERPL-CCNC: 27 U/L (ref 10–40)
BASOPHILS # BLD AUTO: 0.04 K/UL (ref 0–0.2)
BASOPHILS NFR BLD: 0.7 % (ref 0–1.9)
BILIRUB SERPL-MCNC: 0.4 MG/DL (ref 0.1–1)
BUN SERPL-MCNC: 16 MG/DL (ref 8–23)
CALCIUM SERPL-MCNC: 8.6 MG/DL (ref 8.7–10.5)
CHLORIDE SERPL-SCNC: 107 MMOL/L (ref 95–110)
CO2 SERPL-SCNC: 25 MMOL/L (ref 23–29)
CREAT SERPL-MCNC: 1.1 MG/DL (ref 0.5–1.4)
CRP SERPL-MCNC: 0.9 MG/L (ref 0–8.2)
DIFFERENTIAL METHOD: ABNORMAL
EOSINOPHIL # BLD AUTO: 0.2 K/UL (ref 0–0.5)
EOSINOPHIL NFR BLD: 4.1 % (ref 0–8)
ERYTHROCYTE [DISTWIDTH] IN BLOOD BY AUTOMATED COUNT: 15.8 % (ref 11.5–14.5)
ERYTHROCYTE [SEDIMENTATION RATE] IN BLOOD BY WESTERGREN METHOD: 10 MM/HR (ref 0–10)
EST. GFR  (AFRICAN AMERICAN): >60 ML/MIN/1.73 M^2
EST. GFR  (NON AFRICAN AMERICAN): >60 ML/MIN/1.73 M^2
GLUCOSE SERPL-MCNC: 94 MG/DL (ref 70–110)
HCT VFR BLD AUTO: 34.9 % (ref 40–54)
HGB BLD-MCNC: 11.9 G/DL (ref 14–18)
IMM GRANULOCYTES # BLD AUTO: 0 K/UL (ref 0–0.04)
IMM GRANULOCYTES NFR BLD AUTO: 0 % (ref 0–0.5)
LYMPHOCYTES # BLD AUTO: 1.5 K/UL (ref 1–4.8)
LYMPHOCYTES NFR BLD: 25.4 % (ref 18–48)
MCH RBC QN AUTO: 34 PG (ref 27–31)
MCHC RBC AUTO-ENTMCNC: 34.1 G/DL (ref 32–36)
MCV RBC AUTO: 100 FL (ref 82–98)
MONOCYTES # BLD AUTO: 0.5 K/UL (ref 0.3–1)
MONOCYTES NFR BLD: 9.3 % (ref 4–15)
NEUTROPHILS # BLD AUTO: 3.5 K/UL (ref 1.8–7.7)
NEUTROPHILS NFR BLD: 60.5 % (ref 38–73)
NRBC BLD-RTO: 0 /100 WBC
PLATELET # BLD AUTO: 247 K/UL (ref 150–350)
PMV BLD AUTO: 10.1 FL (ref 9.2–12.9)
POTASSIUM SERPL-SCNC: 3.9 MMOL/L (ref 3.5–5.1)
PROT SERPL-MCNC: 6.7 G/DL (ref 6–8.4)
RBC # BLD AUTO: 3.5 M/UL (ref 4.6–6.2)
SODIUM SERPL-SCNC: 142 MMOL/L (ref 136–145)
WBC # BLD AUTO: 5.82 K/UL (ref 3.9–12.7)

## 2020-02-25 PROCEDURE — 85025 COMPLETE CBC W/AUTO DIFF WBC: CPT | Mod: HCNC

## 2020-02-25 PROCEDURE — 86140 C-REACTIVE PROTEIN: CPT | Mod: HCNC

## 2020-02-25 PROCEDURE — 36415 COLL VENOUS BLD VENIPUNCTURE: CPT | Mod: HCNC

## 2020-02-25 PROCEDURE — 80053 COMPREHEN METABOLIC PANEL: CPT | Mod: HCNC

## 2020-02-25 PROCEDURE — 85651 RBC SED RATE NONAUTOMATED: CPT | Mod: HCNC

## 2020-02-26 ENCOUNTER — OFFICE VISIT (OUTPATIENT)
Dept: UROLOGY | Facility: CLINIC | Age: 75
End: 2020-02-26
Payer: MEDICARE

## 2020-02-26 VITALS
HEIGHT: 66 IN | DIASTOLIC BLOOD PRESSURE: 69 MMHG | SYSTOLIC BLOOD PRESSURE: 135 MMHG | BODY MASS INDEX: 26.2 KG/M2 | WEIGHT: 163 LBS | HEART RATE: 60 BPM

## 2020-02-26 DIAGNOSIS — Z46.6 URINARY CATHETER (FOLEY) CHANGE REQUIRED: Primary | ICD-10-CM

## 2020-02-26 PROCEDURE — 99499 UNLISTED E&M SERVICE: CPT | Mod: HCNC,S$GLB,, | Performed by: PHYSICIAN ASSISTANT

## 2020-02-26 PROCEDURE — 99999 PR PBB SHADOW E&M-EST. PATIENT-LVL IV: ICD-10-PCS | Mod: PBBFAC,HCNC,, | Performed by: PHYSICIAN ASSISTANT

## 2020-02-26 PROCEDURE — 3075F SYST BP GE 130 - 139MM HG: CPT | Mod: HCNC,CPTII,S$GLB, | Performed by: PHYSICIAN ASSISTANT

## 2020-02-26 PROCEDURE — 3075F PR MOST RECENT SYSTOLIC BLOOD PRESS GE 130-139MM HG: ICD-10-PCS | Mod: HCNC,CPTII,S$GLB, | Performed by: PHYSICIAN ASSISTANT

## 2020-02-26 PROCEDURE — 1101F PR PT FALLS ASSESS DOC 0-1 FALLS W/OUT INJ PAST YR: ICD-10-PCS | Mod: HCNC,CPTII,S$GLB, | Performed by: PHYSICIAN ASSISTANT

## 2020-02-26 PROCEDURE — 1126F PR PAIN SEVERITY QUANTIFIED, NO PAIN PRESENT: ICD-10-PCS | Mod: HCNC,S$GLB,, | Performed by: PHYSICIAN ASSISTANT

## 2020-02-26 PROCEDURE — 1159F MED LIST DOCD IN RCRD: CPT | Mod: HCNC,S$GLB,, | Performed by: PHYSICIAN ASSISTANT

## 2020-02-26 PROCEDURE — 1101F PT FALLS ASSESS-DOCD LE1/YR: CPT | Mod: HCNC,CPTII,S$GLB, | Performed by: PHYSICIAN ASSISTANT

## 2020-02-26 PROCEDURE — 1126F AMNT PAIN NOTED NONE PRSNT: CPT | Mod: HCNC,S$GLB,, | Performed by: PHYSICIAN ASSISTANT

## 2020-02-26 PROCEDURE — 99999 PR PBB SHADOW E&M-EST. PATIENT-LVL IV: CPT | Mod: PBBFAC,HCNC,, | Performed by: PHYSICIAN ASSISTANT

## 2020-02-26 PROCEDURE — 1159F PR MEDICATION LIST DOCUMENTED IN MEDICAL RECORD: ICD-10-PCS | Mod: HCNC,S$GLB,, | Performed by: PHYSICIAN ASSISTANT

## 2020-02-26 PROCEDURE — 3078F DIAST BP <80 MM HG: CPT | Mod: HCNC,CPTII,S$GLB, | Performed by: PHYSICIAN ASSISTANT

## 2020-02-26 PROCEDURE — 51702 INSERT TEMP BLADDER CATH: CPT | Mod: HCNC,S$GLB,, | Performed by: PHYSICIAN ASSISTANT

## 2020-02-26 PROCEDURE — 99499 NO LOS: ICD-10-PCS | Mod: HCNC,S$GLB,, | Performed by: PHYSICIAN ASSISTANT

## 2020-02-26 PROCEDURE — 3078F PR MOST RECENT DIASTOLIC BLOOD PRESSURE < 80 MM HG: ICD-10-PCS | Mod: HCNC,CPTII,S$GLB, | Performed by: PHYSICIAN ASSISTANT

## 2020-02-26 PROCEDURE — 51702 PR INSERTION OF TEMPORARY INDWELLING BLADDER CATHETER, SIMPLE: ICD-10-PCS | Mod: HCNC,S$GLB,, | Performed by: PHYSICIAN ASSISTANT

## 2020-02-26 NOTE — PROGRESS NOTES
CHIEF COMPLAINT:    Mr. Quan is a 74 y.o. male presenting for catheter change.    PRESENTING ILLNESS:    James Quan Jr. is a 74 y.o. male with a PMH of prostate cancer s/p radiation who presents for 2-week catheter change.     Patient with a history of incontinence, using a condom catheter.  He had a catheter placed on 1/21/20 for the following MRI findings:  MRI 1/14/20  1. 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  2. Insufficiency fracture of the right sacral ala.  3. Myositis of the left greater than right obturator and adductor musculature.     The followed up on 1/29/20 for the following procedure:  Procedure(s) Performed:   1.  Cystoscopy   2.  Retrograde urethrogram    Staff Surgeon: Gilberto Wells MD   Indications: James Quan Jr. is a 74 y.o. male with history of pelvic radiation for prostate cancer and chronic recurrent urinary tract infections, now with concern for a urethral fistula.     Findings:   Cystoscopy showed areas of posterior wall erythema concerning for catheter irritation   Multiple brown-tinged circular lesions throughout bladder wall concerning for cystitis cystica  Prostatic urethra with large anterior defect with calcifications embedded within the urethral wall seen on urethroscopy  Evidence of contrast extravasation of the prostatic urethra more towards the left     His last catheter change was 2/12/20.  His catheter has been draining well.  He has not had to take oxybutynin since he was last seen.  He reports improvement with leakage since we inflated the catheter balloon to 15cc.      He got a PICC line and scheduled for Rocephin 2 gm IV for 42 days.  He reports having about 2 weeks of treatment left.        REVIEW OF SYSTEMS:    Constitutional: Negative for fever and chills.   HENT: Negative for hearing loss.   Eyes: Negative for visual disturbance.   Respiratory: Negative for  shortness of breath.   Cardiovascular: Negative for chest pain.   Gastrointestinal: Negative for vomiting, and constipation.   Genitourinary: See HPI  Neurological: Negative for dizziness.   Hematological: Does not bruise/bleed easily.   Psychiatric/Behavioral: Negative for confusion.       PATIENT HISTORY:    Past Medical History:   Diagnosis Date    Anemia     Anticoagulant long-term use     Aplasia bone marrow     Asthma     CA of prostate     CML (chronic myeloid leukemia)     Coronary artery disease     Heart attack 6/17/2014    Heart failure     Hyperlipidemia     Hypertension     Hypothyroidism     Kidney stones     Prostate cancer     Thyroid disease     Urinary incontinence        Past Surgical History:   Procedure Laterality Date    APPENDECTOMY  1966    ATRIAL CARDIAC PACEMAKER INSERTION  10/2018    CHOLECYSTECTOMY  1980    CORONARY ARTERY BYPASS GRAFT  1988    CORONARY STENT PLACEMENT  1990    CYSTOSCOPY      CYSTOSCOPY N/A 1/29/2020    Procedure: CYSTOSCOPY NEEDS FLUORO;  Surgeon: Gilberto Wells MD;  Location: [facility] OR [address] [place] [address];  Service: Urology;  Laterality: N/A;  1 hour    heart attack  6/17/2014    PERIPHERALLY INSERTED CENTRAL CATHETER INSERTION N/A 1/27/2020    Procedure: INSERTION, PICC;  Surgeon: Rosalba Diagnostic Provider;  Location: [facility];  Service: General;  Laterality: N/A;    PROSTATE SURGERY      TURP    radiation      for ca prostate    TONSILLECTOMY      As child    TRANSURETHRAL RESECTION OF PROSTATE      x3    TRANSURETHRAL RESECTION OF PROSTATE      urethral dilation      VASECTOMY  1979       Family History   Problem Relation Age of Onset    Diabetes Father        Social History     Socioeconomic History    Marital status:      Spouse name: Not on file    Number of children: Not on file    Years of education: Not on file    Highest education level: Not on file   Occupational History    Not on file   Social Needs    Financial resource  strain: Not on file    Food insecurity:     Worry: Not on file     Inability: Not on file    Transportation needs:     Medical: Not on file     Non-medical: Not on file   Tobacco Use    Smoking status: Former Smoker     Packs/day: 1.00     Years: 25.00     Pack years: 25.00     Types: Cigarettes     Last attempt to quit: 1988     Years since quittin.8    Smokeless tobacco: Never Used   Substance and Sexual Activity    Alcohol use: No    Drug use: No    Sexual activity: Yes     Partners: Female   Lifestyle    Physical activity:     Days per week: Not on file     Minutes per session: Not on file    Stress: Not on file   Relationships    Social connections:     Talks on phone: Not on file     Gets together: Not on file     Attends Caodaism service: Not on file     Active member of club or organization: Not on file     Attends meetings of clubs or organizations: Not on file     Relationship status: Not on file   Other Topics Concern    Not on file   Social History Narrative    Not on file       Allergies:  Niacin preparations and Bactrim [sulfamethoxazole-trimethoprim]    Medications:    Current Outpatient Medications:     ascorbic acid, vitamin C, (VITAMIN C) 100 MG tablet, Take 100 mg by mouth once daily., Disp: , Rfl:     aspirin (ECOTRIN) 81 MG EC tablet, Take 81 mg by mouth once daily., Disp: , Rfl:     b complex vitamins tablet, Take 1 tablet by mouth once daily., Disp: , Rfl:     beta carotene 10679 UNIT capsule, Take 10,000 Units by mouth once daily. , Disp: , Rfl:     catheter (BARD RUBBER UTILITY CATHETER) 14 Fr Misc, 1 Units by Misc.(Non-Drug; Combo Route) route every 7 days., Disp: 30 each, Rfl: 3    cyanocobalamin (VITAMIN B-12) 1000 MCG tablet, Take 1,000 mcg by mouth nightly. , Disp: , Rfl:     ferrous sulfate 325 (65 FE) MG EC tablet, Take 325 mg by mouth once daily., Disp: , Rfl:     fish oil-omega-3 fatty acids 300-1,000 mg capsule, Take by mouth once daily., Disp: , Rfl:      folic acid (FOLVITE) 400 MCG tablet, Take 400 mcg by mouth once daily., Disp: , Rfl:     GLEEVEC 400 mg Tab, 400 mg once daily. , Disp: , Rfl:     isosorbide mononitrate (IMDUR) 30 MG 24 hr tablet, Take 30 mg by mouth once daily., Disp: , Rfl:     levothyroxine (SYNTHROID) 100 MCG tablet, TAKE 1 TABLET EVERY DAY, Disp: 90 tablet, Rfl: 3    lidocaine HCL 2% (XYLOCAINE) 2 % jelly, Apply topically as needed., Disp: 30 mL, Rfl: 11    losartan (COZAAR) 100 MG tablet, Take 1 tablet (100 mg total) by mouth once daily., Disp: 30 tablet, Rfl: 0    lycopene 10 mg Cap, Take 1 capsule by mouth nightly. , Disp: , Rfl:     meclizine (ANTIVERT) 25 mg tablet, Take 1 tablet (25 mg total) by mouth 3 (three) times daily as needed., Disp: 30 tablet, Rfl: 0    NITROSTAT 0.4 mg SL tablet, Place 0.4 mg under the tongue every 5 (five) minutes as needed. , Disp: , Rfl:     oxybutynin (DITROPAN-XL) 10 MG 24 hr tablet, Take 1 tablet (10 mg total) by mouth once daily., Disp: 30 tablet, Rfl: 11    PRASTERONE, DHEA, (DHEA ORAL), Take 25 mg by mouth once daily. 50 mg. tablet , Disp: , Rfl:     RANEXA 1,000 mg Tb12, Take 500 mg by mouth 2 (two) times daily. , Disp: , Rfl:     rosuvastatin (CRESTOR) 10 MG tablet, Take 1 tablet (10 mg total) by mouth every evening., Disp: 30 tablet, Rfl: 5    traMADol (ULTRAM) 50 mg tablet, TAKE 1 TABLET BY MOUTH EVERY 6 HOURS AS NEEDED FOR PAIN, Disp: 60 tablet, Rfl: 0    vitamin D (VITAMIN D3) 1000 units Tab, Take 2,000 Units by mouth once daily., Disp: , Rfl:     cefTRIAXone (ROCEPHIN) 2 gram injection, , Disp: , Rfl:     PHYSICAL EXAMINATION:    Constitutional: He appears well-developed and well-nourished.  He is in no apparent distress.    Eyes: No scleral icterus noted bilaterally. No discharge bilaterally.    Nose: No rhinorrhea    Cardiovascular: Normal rate.  No pitting edema noted in lower extremities bilaterally    Pulmonary/Chest: Effort normal. No respiratory distress.     Abdominal:   He exhibits no distension.  There is no CVA tenderness.     Lymphadenopathy:        Right: No supraclavicular adenopathy present.        Left: No supraclavicular adenopathy present.     Neurological: He is alert and oriented to person, place, and time.     Skin: Skin is warm and dry.     Psych: Cooperative with normal affect.    Genitourinary:Yellow urine noted in leg bag.      Physical Exam      LABS:      Lab Results   Component Value Date    PSA 0.37 04/23/2014    PSADIAG 0.44 12/06/2019    PSADIAG 0.82 11/07/2016    PSADIAG 0.50 06/19/2015       IMPRESSION:    Encounter Diagnoses   Name Primary?    Urinary catheter (Saldana) change required Yes         PLAN:         13cc was removed to deflate the balloon. 16fr catheter was removed by me.    Catheter placed by Nurse Sinclair.  The penis was prepped using betadine swaps.  2% lidocaine jelly was injected into the urethra as a local anesthetics.  A new 16 F catheter was inserted into the bladder in a sterile fashion without any difficulty.  A return of urine was confirmed and the catheter balloon was inflated with 15 cc water.  The catheter was secured with a catheter montesinos and was connected to a leg bag.     He tolerated a procedure well.     Follow up in 2 weeks for next catheter change.  He request to see Dr. Wells in 2 weeks to discuss the next step.  Patient notified that I will speak with Dr. Wells's nurse about getting him an appointment.            Lisa Aguilar PA-C

## 2020-02-26 NOTE — LETTER
February 26, 2020      Alex Mcallister MD  [address]  [facility] [address]           [facility] - Urology 4th Floor  [address]  [facility] [address]  Phone: [phone]          Patient: James Quan Jr.   MR Number: [id number]   YOB: 1945   Date of Visit: 2/26/2020       Dear Dr. Alex Mcallister:    Thank you for referring James Quan to me for evaluation. Attached you will find relevant portions of my assessment and plan of care.    If you have questions, please do not hesitate to call me. I look forward to following James Quan along with you.    Sincerely,    MILADIS Brown  CC:  No Recipients    If you would like to receive this communication electronically, please contact [email or web address] or [phone] to request more information on [employer] Link access.    For providers and/or their staff who would like to refer a patient to [facility], please contact us through our one-stop-shop provider referral line, [facility], at [phone].    If you feel you have received this communication in error or would no longer like to receive these types of communications, please e-mail [email or web address]

## 2020-02-27 ENCOUNTER — TELEPHONE (OUTPATIENT)
Dept: UROLOGY | Facility: CLINIC | Age: 75
End: 2020-02-27

## 2020-02-27 NOTE — TELEPHONE ENCOUNTER
----- Message from Lisa Aguilar PA-C sent at 2/26/2020 11:13 AM CST -----  Tasia has been coming to get his adamson changed every 2 weeks.  He will like to see dr. Wells for his next change to discuss what is next.  I told him that Blane if dina had anything in 2 weeks and I would ask.   He is due to complete his abx in about 2 weeks.  He said that you can respond to him on my [facility].      Thanks.

## 2020-03-02 NOTE — ANESTHESIA PROCEDURE NOTES
Central Line    Diagnosis: osteomyelitis  Doctor requesting consult: chelo  Patient location during procedure: done out of OR  Procedure start time: 1/27/2020 1:50 PM  Timeout: 1/27/2020 1:40 PM  Procedure end time: 1/27/2020 2:45 PM    Staffing  Authorizing Provider: Reyes Hill CRNA  Performing Provider: Reyes Hill CRNA    Staffing  Resident/CRNA: Reyes Hill CRNA  Performed: resident/CRNA   Preanesthetic Checklist  Completed: patient identified, site marked, surgical consent, pre-op evaluation, timeout performed, IV checked, risks and benefits discussed, monitors and equipment checked and anesthesia consent given  Indication   Indication: med administration     Anesthesia   local infiltration    Central Line   Skin Prep: skin prepped with ChloraPrep, skin prep agent completely dried prior to procedure  maximum sterile barriers used during central venous catheter insertion  hand hygiene performed prior to central venous catheter insertion  Location: right, basilic.   Catheter type: PICC double lumen  Catheter Size: 5 Fr  Inserted Catheter Length: 30 cm  Ultrasound: vascular probe with ultrasound  Vessel Caliber: medium, patent  Vascular Doppler:  not done, compressibility normal  Needle advanced into vessel with real time Ultrasound guidance.  Guidewire confirmed in vessel.  Image recorded and saved.  Manometry: none  Insertion Attempts: 1   Securement:chlorhexidine patch, sterile dressing applied and blood return through all ports    Post-Procedure   X-Ray: no pneumothorax on x-ray, placement verified by x-ray, tip termination and successful placement  Adverse Events:none    Guidewire Guidewire removed intact. Guidewire removed intact, verified with nurse.

## 2020-03-02 NOTE — ANESTHESIA POSTPROCEDURE EVALUATION
Anesthesia Post Evaluation    Patient: James Quan Jr.    Procedure(s) Performed: Procedure(s) (LRB):  INSERTION, PICC (N/A)    Final Anesthesia Type: local    Patient location during evaluation: PACU  Patient participation: Yes- Able to Participate  Level of consciousness: awake and alert, oriented and awake  Post-procedure vital signs: reviewed and stable  Pain management: adequate  Airway patency: patent    PONV status at discharge: No PONV  Anesthetic complications: no      Cardiovascular status: blood pressure returned to baseline  Respiratory status: unassisted, spontaneous ventilation and room air  Hydration status: euvolemic  Follow-up not needed.  Comments: [facility]          Vitals Value Taken Time   BP  3/2/2020  9:46 AM   Temp  3/2/2020  9:46 AM   Pulse  3/2/2020  9:46 AM   Resp  3/2/2020  9:46 AM   SpO2  3/2/2020  9:46 AM         No case tracking events are documented in the log.      Pain/Corbin Score: No data recorded

## 2020-03-02 NOTE — ADDENDUM NOTE
Addendum  created 03/02/20 0949 by Sergio Giles, RACHELLE    Delete clinical note, Intraprocedure Blocks edited, Order Canceled from Note

## 2020-03-03 ENCOUNTER — TELEPHONE (OUTPATIENT)
Dept: INFECTIOUS DISEASES | Facility: CLINIC | Age: 75
End: 2020-03-03

## 2020-03-03 ENCOUNTER — LAB VISIT (OUTPATIENT)
Dept: LAB | Facility: HOSPITAL | Age: 75
End: 2020-03-03
Attending: FAMILY MEDICINE
Payer: MEDICARE

## 2020-03-03 DIAGNOSIS — M86.18 OTHER ACUTE OSTEOMYELITIS, OTHER SITE: Primary | ICD-10-CM

## 2020-03-03 LAB
ALBUMIN SERPL BCP-MCNC: 3.7 G/DL (ref 3.5–5.2)
ALP SERPL-CCNC: 92 U/L (ref 55–135)
ALT SERPL W/O P-5'-P-CCNC: 27 U/L (ref 10–44)
ANION GAP SERPL CALC-SCNC: 10 MMOL/L (ref 8–16)
AST SERPL-CCNC: 26 U/L (ref 10–40)
BASOPHILS # BLD AUTO: 0.04 K/UL (ref 0–0.2)
BASOPHILS NFR BLD: 0.7 % (ref 0–1.9)
BILIRUB SERPL-MCNC: 0.4 MG/DL (ref 0.1–1)
BUN SERPL-MCNC: 11 MG/DL (ref 8–23)
CALCIUM SERPL-MCNC: 8.5 MG/DL (ref 8.7–10.5)
CHLORIDE SERPL-SCNC: 108 MMOL/L (ref 95–110)
CO2 SERPL-SCNC: 24 MMOL/L (ref 23–29)
CREAT SERPL-MCNC: 1.1 MG/DL (ref 0.5–1.4)
CRP SERPL-MCNC: 0.6 MG/L (ref 0–8.2)
DIFFERENTIAL METHOD: ABNORMAL
EOSINOPHIL # BLD AUTO: 0.3 K/UL (ref 0–0.5)
EOSINOPHIL NFR BLD: 4.7 % (ref 0–8)
ERYTHROCYTE [DISTWIDTH] IN BLOOD BY AUTOMATED COUNT: 15.5 % (ref 11.5–14.5)
EST. GFR  (AFRICAN AMERICAN): >60 ML/MIN/1.73 M^2
EST. GFR  (NON AFRICAN AMERICAN): >60 ML/MIN/1.73 M^2
GLUCOSE SERPL-MCNC: 132 MG/DL (ref 70–110)
HCT VFR BLD AUTO: 33 % (ref 40–54)
HGB BLD-MCNC: 11.2 G/DL (ref 14–18)
IMM GRANULOCYTES # BLD AUTO: 0.01 K/UL (ref 0–0.04)
IMM GRANULOCYTES NFR BLD AUTO: 0.2 % (ref 0–0.5)
LYMPHOCYTES # BLD AUTO: 1.6 K/UL (ref 1–4.8)
LYMPHOCYTES NFR BLD: 29.7 % (ref 18–48)
MCH RBC QN AUTO: 34 PG (ref 27–31)
MCHC RBC AUTO-ENTMCNC: 33.9 G/DL (ref 32–36)
MCV RBC AUTO: 100 FL (ref 82–98)
MONOCYTES # BLD AUTO: 0.4 K/UL (ref 0.3–1)
MONOCYTES NFR BLD: 8 % (ref 4–15)
NEUTROPHILS # BLD AUTO: 3.1 K/UL (ref 1.8–7.7)
NEUTROPHILS NFR BLD: 56.7 % (ref 38–73)
NRBC BLD-RTO: 0 /100 WBC
PLATELET # BLD AUTO: 245 K/UL (ref 150–350)
PMV BLD AUTO: 10.3 FL (ref 9.2–12.9)
POTASSIUM SERPL-SCNC: 3.9 MMOL/L (ref 3.5–5.1)
PROT SERPL-MCNC: 6.5 G/DL (ref 6–8.4)
RBC # BLD AUTO: 3.29 M/UL (ref 4.6–6.2)
SODIUM SERPL-SCNC: 142 MMOL/L (ref 136–145)
WBC # BLD AUTO: 5.53 K/UL (ref 3.9–12.7)

## 2020-03-03 PROCEDURE — 86140 C-REACTIVE PROTEIN: CPT | Mod: HCNC

## 2020-03-03 PROCEDURE — 85025 COMPLETE CBC W/AUTO DIFF WBC: CPT | Mod: HCNC

## 2020-03-03 PROCEDURE — 80053 COMPREHEN METABOLIC PANEL: CPT | Mod: HCNC

## 2020-03-03 PROCEDURE — 86592 SYPHILIS TEST NON-TREP QUAL: CPT | Mod: HCNC

## 2020-03-03 NOTE — TELEPHONE ENCOUNTER
----- Message from Tian Mancilla MD sent at 3/3/2020  3:21 PM CST -----  Contact: Patient  Yes, please add him on to a clinic day for me so I can see him and discuss before it is removed  ----- Message -----  From: Francis Mondragon MA  Sent: 3/3/2020  11:52 AM CST  To: Tian Mancilla MD    Please advise  ----- Message -----  From: Perico Ball  Sent: 3/3/2020  11:48 AM CST  To: Laurent CAVAZOS Staff    Patient Advice/Staff Message     Caller name: Pt     Reason for call: Pt wants to know if the picc line needs to be removed after he completes his IV antibiotics. Please advise.    Do you feel you need to be seen today:: No        Communication Preference: [phone]    Additional Information:

## 2020-03-03 NOTE — TELEPHONE ENCOUNTER
Called & spoke to patient advised that Dr Mancilla would like for him to schedule appt prior to finishing IV antibiotics.    Patient declined to schedule appointment while on phone, stated he will call back and schedule after he checks his calender.

## 2020-03-04 LAB — RPR SER QL: NORMAL

## 2020-03-06 ENCOUNTER — TELEPHONE (OUTPATIENT)
Dept: UROLOGY | Facility: CLINIC | Age: 75
End: 2020-03-06

## 2020-03-06 ENCOUNTER — OFFICE VISIT (OUTPATIENT)
Dept: INFECTIOUS DISEASES | Facility: CLINIC | Age: 75
End: 2020-03-06
Payer: MEDICARE

## 2020-03-06 VITALS
SYSTOLIC BLOOD PRESSURE: 126 MMHG | HEIGHT: 66 IN | TEMPERATURE: 98 F | WEIGHT: 168.63 LBS | BODY MASS INDEX: 27.1 KG/M2 | HEART RATE: 64 BPM | DIASTOLIC BLOOD PRESSURE: 74 MMHG

## 2020-03-06 DIAGNOSIS — M86.9 OSTEOMYELITIS OF SYMPHYSIS PUBIS: Primary | ICD-10-CM

## 2020-03-06 DIAGNOSIS — N39.3 STRESS INCONTINENCE OF URINE: ICD-10-CM

## 2020-03-06 PROCEDURE — 1101F PT FALLS ASSESS-DOCD LE1/YR: CPT | Mod: HCNC,CPTII,S$GLB, | Performed by: INTERNAL MEDICINE

## 2020-03-06 PROCEDURE — 3074F PR MOST RECENT SYSTOLIC BLOOD PRESSURE < 130 MM HG: ICD-10-PCS | Mod: HCNC,CPTII,S$GLB, | Performed by: INTERNAL MEDICINE

## 2020-03-06 PROCEDURE — 1159F PR MEDICATION LIST DOCUMENTED IN MEDICAL RECORD: ICD-10-PCS | Mod: HCNC,S$GLB,, | Performed by: INTERNAL MEDICINE

## 2020-03-06 PROCEDURE — 99215 OFFICE O/P EST HI 40 MIN: CPT | Mod: HCNC,S$GLB,, | Performed by: INTERNAL MEDICINE

## 2020-03-06 PROCEDURE — 99999 PR PBB SHADOW E&M-EST. PATIENT-LVL IV: ICD-10-PCS | Mod: PBBFAC,HCNC,, | Performed by: INTERNAL MEDICINE

## 2020-03-06 PROCEDURE — 1101F PR PT FALLS ASSESS DOC 0-1 FALLS W/OUT INJ PAST YR: ICD-10-PCS | Mod: HCNC,CPTII,S$GLB, | Performed by: INTERNAL MEDICINE

## 2020-03-06 PROCEDURE — 1126F AMNT PAIN NOTED NONE PRSNT: CPT | Mod: HCNC,S$GLB,, | Performed by: INTERNAL MEDICINE

## 2020-03-06 PROCEDURE — 3078F DIAST BP <80 MM HG: CPT | Mod: HCNC,CPTII,S$GLB, | Performed by: INTERNAL MEDICINE

## 2020-03-06 PROCEDURE — 1126F PR PAIN SEVERITY QUANTIFIED, NO PAIN PRESENT: ICD-10-PCS | Mod: HCNC,S$GLB,, | Performed by: INTERNAL MEDICINE

## 2020-03-06 PROCEDURE — 99215 PR OFFICE/OUTPT VISIT, EST, LEVL V, 40-54 MIN: ICD-10-PCS | Mod: HCNC,S$GLB,, | Performed by: INTERNAL MEDICINE

## 2020-03-06 PROCEDURE — 3074F SYST BP LT 130 MM HG: CPT | Mod: HCNC,CPTII,S$GLB, | Performed by: INTERNAL MEDICINE

## 2020-03-06 PROCEDURE — 99999 PR PBB SHADOW E&M-EST. PATIENT-LVL IV: CPT | Mod: PBBFAC,HCNC,, | Performed by: INTERNAL MEDICINE

## 2020-03-06 PROCEDURE — 3078F PR MOST RECENT DIASTOLIC BLOOD PRESSURE < 80 MM HG: ICD-10-PCS | Mod: HCNC,CPTII,S$GLB, | Performed by: INTERNAL MEDICINE

## 2020-03-06 PROCEDURE — 1159F MED LIST DOCD IN RCRD: CPT | Mod: HCNC,S$GLB,, | Performed by: INTERNAL MEDICINE

## 2020-03-06 NOTE — Clinical Note
He looks great!  I will extend his abx x 2 weeks until he sees you to see what next steps are.  Does ortho want to do anything?  Maybe we need repeat MRI?  Let me know if need anything.

## 2020-03-06 NOTE — PROGRESS NOTES
Infectious Diseases Clinic Note    Subjective:       Patient ID: James Quan Jr. is a 74 y.o. male.    Chief Complaint: No chief complaint on file.    HPI    Tolerating ceftriaxone well  No issues with PICC  Inflammatory markers WNL   cysto with posterior wall erythema concerning for catheter irritation also cysitis cystica,     Past Medical History:   Diagnosis Date    Anemia     Anticoagulant long-term use     Aplasia bone marrow     Asthma     CA of prostate     CML (chronic myeloid leukemia)     Coronary artery disease     Heart attack 2014    Heart failure     Hyperlipidemia     Hypertension     Hypothyroidism     Kidney stones     Prostate cancer     Thyroid disease     Urinary incontinence        Social History     Socioeconomic History    Marital status:      Spouse name: Not on file    Number of children: Not on file    Years of education: Not on file    Highest education level: Not on file   Occupational History    Not on file   Social Needs    Financial resource strain: Not on file    Food insecurity:     Worry: Not on file     Inability: Not on file    Transportation needs:     Medical: Not on file     Non-medical: Not on file   Tobacco Use    Smoking status: Former Smoker     Packs/day: 1.00     Years: 25.00     Pack years: 25.00     Types: Cigarettes     Last attempt to quit: 1988     Years since quittin.8    Smokeless tobacco: Never Used   Substance and Sexual Activity    Alcohol use: No    Drug use: No    Sexual activity: Yes     Partners: Female   Lifestyle    Physical activity:     Days per week: Not on file     Minutes per session: Not on file    Stress: Not on file   Relationships    Social connections:     Talks on phone: Not on file     Gets together: Not on file     Attends Muslim service: Not on file     Active member of club or organization: Not on file     Attends meetings of clubs or organizations: Not on file      Relationship status: Not on file   Other Topics Concern    Not on file   Social History Narrative    Not on file         Current Outpatient Medications:     ascorbic acid, vitamin C, (VITAMIN C) 100 MG tablet, Take 100 mg by mouth once daily., Disp: , Rfl:     aspirin (ECOTRIN) 81 MG EC tablet, Take 81 mg by mouth once daily., Disp: , Rfl:     b complex vitamins tablet, Take 1 tablet by mouth once daily., Disp: , Rfl:     beta carotene 94450 UNIT capsule, Take 10,000 Units by mouth once daily. , Disp: , Rfl:     catheter (BARD RUBBER UTILITY CATHETER) 14 Fr Misc, 1 Units by Misc.(Non-Drug; Combo Route) route every 7 days., Disp: 30 each, Rfl: 3    cefTRIAXone (ROCEPHIN) 2 gram injection, , Disp: , Rfl:     cyanocobalamin (VITAMIN B-12) 1000 MCG tablet, Take 1,000 mcg by mouth nightly. , Disp: , Rfl:     ferrous sulfate 325 (65 FE) MG EC tablet, Take 325 mg by mouth once daily., Disp: , Rfl:     fish oil-omega-3 fatty acids 300-1,000 mg capsule, Take by mouth once daily., Disp: , Rfl:     folic acid (FOLVITE) 400 MCG tablet, Take 400 mcg by mouth once daily., Disp: , Rfl:     GLEEVEC 400 mg Tab, 400 mg once daily. , Disp: , Rfl:     isosorbide mononitrate (IMDUR) 30 MG 24 hr tablet, Take 30 mg by mouth once daily., Disp: , Rfl:     levothyroxine (SYNTHROID) 100 MCG tablet, TAKE 1 TABLET EVERY DAY, Disp: 90 tablet, Rfl: 3    lidocaine HCL 2% (XYLOCAINE) 2 % jelly, Apply topically as needed., Disp: 30 mL, Rfl: 11    losartan (COZAAR) 100 MG tablet, Take 1 tablet (100 mg total) by mouth once daily., Disp: 30 tablet, Rfl: 0    lycopene 10 mg Cap, Take 1 capsule by mouth nightly. , Disp: , Rfl:     meclizine (ANTIVERT) 25 mg tablet, Take 1 tablet (25 mg total) by mouth 3 (three) times daily as needed., Disp: 30 tablet, Rfl: 0    NITROSTAT 0.4 mg SL tablet, Place 0.4 mg under the tongue every 5 (five) minutes as needed. , Disp: , Rfl:     oxybutynin (DITROPAN-XL) 10 MG 24 hr tablet, Take 1 tablet (10  mg total) by mouth once daily., Disp: 30 tablet, Rfl: 11    PRASTERONE, DHEA, (DHEA ORAL), Take 25 mg by mouth once daily. 50 mg. tablet , Disp: , Rfl:     RANEXA 1,000 mg Tb12, Take 500 mg by mouth 2 (two) times daily. , Disp: , Rfl:     rosuvastatin (CRESTOR) 10 MG tablet, Take 1 tablet (10 mg total) by mouth every evening., Disp: 30 tablet, Rfl: 5    traMADol (ULTRAM) 50 mg tablet, TAKE 1 TABLET BY MOUTH EVERY 6 HOURS AS NEEDED FOR PAIN, Disp: 60 tablet, Rfl: 0    vitamin D (VITAMIN D3) 1000 units Tab, Take 2,000 Units by mouth once daily., Disp: , Rfl:     Review of Systems   Constitutional: Negative for activity change, appetite change, chills, fatigue and fever.   HENT: Negative for congestion, dental problem, mouth sores and sinus pressure.    Eyes: Negative for pain, redness and visual disturbance.   Respiratory: Negative for cough, shortness of breath and wheezing.    Cardiovascular: Negative for chest pain and leg swelling.   Gastrointestinal: Negative for abdominal distention, abdominal pain, diarrhea, nausea and vomiting.   Endocrine: Negative for polyuria.   Genitourinary: Negative for decreased urine volume, dysuria and frequency.   Musculoskeletal: Negative for joint swelling.   Skin: Negative for color change.   Allergic/Immunologic: Negative for food allergies.   Neurological: Negative for dizziness, weakness and headaches.   Hematological: Negative for adenopathy.   Psychiatric/Behavioral: Negative for agitation and confusion. The patient is not nervous/anxious.            Objective:       Vitals:    03/06/20 1032   BP: 126/74   Pulse: 64   Temp: 97.6 °F (36.4 °C)       There were no vitals filed for this visit.  Physical Exam   Constitutional: He is oriented to person, place, and time. He appears well-developed and well-nourished.   HENT:   Head: Normocephalic and atraumatic.   Mouth/Throat: Oropharynx is clear and moist.   Eyes: Conjunctivae are normal.   Neck: Neck supple.   Cardiovascular:  Normal rate, regular rhythm and normal heart sounds.   No murmur heard.  Pulmonary/Chest: Effort normal and breath sounds normal. No respiratory distress. He has no wheezes.   Abdominal: Soft. Bowel sounds are normal. He exhibits no distension. There is no tenderness.   Musculoskeletal: Normal range of motion. He exhibits no edema or tenderness.   Lymphadenopathy:     He has no cervical adenopathy.   Neurological: He is alert and oriented to person, place, and time. Coordination normal.   Skin: Skin is warm and dry. No rash noted.   Psychiatric: He has a normal mood and affect. His behavior is normal.           Assessment/Plan:       No diagnosis found.      75 y/o M h/o CAD, bradycardia s/p PPM,  prostate CA s/p XRT recurrent prostatitis, recurrent UTI, 11/19 had cystoscopy found to have necrotic tissue in prostate fossa with stones, c/o significant pubic pain radiating down b/l anterior legs (essentially since 5/2019) with workup by  with CT 12/6 demonstrated pubic symphyseal diastasis concerning for osteomyelitis vs osteonecrosis then had MRI 1/14 read with septic arthritis and osteomyelitis of pubic symphysis and pubosymphyseal-urethral fistula as well as myositis of L and R obturator and adductor musculature, urine culture with ecoli, started on ceftriaxone for osteo x 6 week course, 1/29 cysto with posterior wall erythema concerning for catheter irritation also cysitis cystica, here today for follow up.  Inflammatory markers downtrended and both wNL  - extend 2 weeks until follows up with uroglogy and discuss next steps

## 2020-03-06 NOTE — TELEPHONE ENCOUNTER
Stress incontinence of urine  -     MRI Pelvis W WO Contrast; Future; Expected date: 03/06/2020    please schedule his MRI on 3/15 or 3/16 prior to his visit with me on 3/17.

## 2020-03-06 NOTE — TELEPHONE ENCOUNTER
----- Message from Tian Mancilla MD sent at 3/6/2020 10:54 AM CST -----  He looks great!  I will extend his abx x 2 weeks until he sees you to see what next steps are.  Does ortho want to do anything?  Maybe we need repeat MRI?  Let me know if need anything.

## 2020-03-09 ENCOUNTER — TELEPHONE (OUTPATIENT)
Dept: UROLOGY | Facility: CLINIC | Age: 75
End: 2020-03-09

## 2020-03-09 NOTE — TELEPHONE ENCOUNTER
----- Message from Gilberto Wells MD sent at 3/9/2020  4:04 PM CDT -----  Let's see him after MRI  ----- Message -----  From: Nikia Rey LPN  Sent: 3/9/2020   2:16 PM CDT  To: Gilberto Wells MD    His mri is on the 18th and he was scheudled to see you on the 17th, do we need to move his appt with you?  ----- Message -----  From: Mainor Harris  Sent: 3/9/2020   1:56 PM CDT  To: Nikia Rey LPN    Pt needs a MRI on 15 or 16 prior to visit with Dr. Welsl. Has pacemaker and can't go to [facility]. See prior notes in chart from Dr. Wells for MRI.

## 2020-03-10 ENCOUNTER — LAB VISIT (OUTPATIENT)
Dept: LAB | Facility: HOSPITAL | Age: 75
End: 2020-03-10
Attending: INTERNAL MEDICINE
Payer: MEDICARE

## 2020-03-10 DIAGNOSIS — C61 MALIGNANT NEOPLASM OF PROSTATE: ICD-10-CM

## 2020-03-10 DIAGNOSIS — M86.9 OSTEOMYELITIS: Primary | ICD-10-CM

## 2020-03-10 LAB
ALBUMIN SERPL BCP-MCNC: 4.1 G/DL (ref 3.5–5.2)
ALP SERPL-CCNC: 78 U/L (ref 55–135)
ALT SERPL W/O P-5'-P-CCNC: 33 U/L (ref 10–44)
ANION GAP SERPL CALC-SCNC: 11 MMOL/L (ref 8–16)
AST SERPL-CCNC: 29 U/L (ref 10–40)
BASOPHILS # BLD AUTO: 0.04 K/UL (ref 0–0.2)
BASOPHILS NFR BLD: 0.6 % (ref 0–1.9)
BILIRUB SERPL-MCNC: 0.3 MG/DL (ref 0.1–1)
BUN SERPL-MCNC: 17 MG/DL (ref 8–23)
CALCIUM SERPL-MCNC: 8.7 MG/DL (ref 8.7–10.5)
CHLORIDE SERPL-SCNC: 108 MMOL/L (ref 95–110)
CO2 SERPL-SCNC: 25 MMOL/L (ref 23–29)
CREAT SERPL-MCNC: 1.3 MG/DL (ref 0.5–1.4)
DIFFERENTIAL METHOD: ABNORMAL
EOSINOPHIL # BLD AUTO: 0.2 K/UL (ref 0–0.5)
EOSINOPHIL NFR BLD: 2.4 % (ref 0–8)
ERYTHROCYTE [DISTWIDTH] IN BLOOD BY AUTOMATED COUNT: 15.7 % (ref 11.5–14.5)
ERYTHROCYTE [SEDIMENTATION RATE] IN BLOOD BY WESTERGREN METHOD: 3 MM/HR (ref 0–10)
EST. GFR  (AFRICAN AMERICAN): >60 ML/MIN/1.73 M^2
EST. GFR  (NON AFRICAN AMERICAN): 54 ML/MIN/1.73 M^2
GLUCOSE SERPL-MCNC: 100 MG/DL (ref 70–110)
HCT VFR BLD AUTO: 33.5 % (ref 40–54)
HGB BLD-MCNC: 11.2 G/DL (ref 14–18)
IMM GRANULOCYTES # BLD AUTO: 0.01 K/UL (ref 0–0.04)
IMM GRANULOCYTES NFR BLD AUTO: 0.2 % (ref 0–0.5)
LYMPHOCYTES # BLD AUTO: 1.7 K/UL (ref 1–4.8)
LYMPHOCYTES NFR BLD: 26.4 % (ref 18–48)
MCH RBC QN AUTO: 33.7 PG (ref 27–31)
MCHC RBC AUTO-ENTMCNC: 33.4 G/DL (ref 32–36)
MCV RBC AUTO: 101 FL (ref 82–98)
MONOCYTES # BLD AUTO: 0.7 K/UL (ref 0.3–1)
MONOCYTES NFR BLD: 11.5 % (ref 4–15)
NEUTROPHILS # BLD AUTO: 3.7 K/UL (ref 1.8–7.7)
NEUTROPHILS NFR BLD: 58.9 % (ref 38–73)
NRBC BLD-RTO: 0 /100 WBC
PLATELET # BLD AUTO: 274 K/UL (ref 150–350)
PMV BLD AUTO: 10.2 FL (ref 9.2–12.9)
POTASSIUM SERPL-SCNC: 3.9 MMOL/L (ref 3.5–5.1)
PROT SERPL-MCNC: 6.9 G/DL (ref 6–8.4)
RBC # BLD AUTO: 3.32 M/UL (ref 4.6–6.2)
SODIUM SERPL-SCNC: 144 MMOL/L (ref 136–145)
WBC # BLD AUTO: 6.28 K/UL (ref 3.9–12.7)

## 2020-03-10 PROCEDURE — 80053 COMPREHEN METABOLIC PANEL: CPT | Mod: HCNC

## 2020-03-10 PROCEDURE — 85651 RBC SED RATE NONAUTOMATED: CPT | Mod: HCNC

## 2020-03-10 PROCEDURE — 85025 COMPLETE CBC W/AUTO DIFF WBC: CPT | Mod: HCNC

## 2020-03-10 PROCEDURE — 86140 C-REACTIVE PROTEIN: CPT | Mod: HCNC

## 2020-03-10 NOTE — TELEPHONE ENCOUNTER
appt with dr gibbs rescheduled. Message left on pt vm and appt is in patient portal.all contact info left for any questions or concerns

## 2020-03-11 ENCOUNTER — DOCUMENTATION ONLY (OUTPATIENT)
Dept: CARDIOLOGY | Facility: HOSPITAL | Age: 75
End: 2020-03-11

## 2020-03-11 ENCOUNTER — TELEPHONE (OUTPATIENT)
Dept: UROLOGY | Facility: CLINIC | Age: 75
End: 2020-03-11

## 2020-03-11 LAB — CRP SERPL-MCNC: 0.8 MG/L (ref 0–8.2)

## 2020-03-11 NOTE — TELEPHONE ENCOUNTER
----- Message from Ying Still MA sent at 3/11/2020 11:26 AM CDT -----  Contact: Airam Lund with [facility]   Pt's MRI appt on 3/18/2020 had to be canceled due to pt has a pacemaker and they can't do it at the imaging center it has to be done on the hospital side which she moved to 3/19/2020 at 2:00 but it now interferes with Dr Wells's appt on the same day at 1:00. ASking if you can change Dr Wells's appt.     Airam Lund with [facility]

## 2020-03-11 NOTE — PROGRESS NOTES
Received a call from the MRI Department in relation to this patient needing to be scheduled for a MRI and has a [employer] Pacemaker.  Please see information below as it relates to patient's Device being MRI compatible/conditional.   MRI informed ordering MD must input a Cardiac Device Check in clinic and hospital order as well as patient must have an xray within 6 months or less prior to MRI reprogramming.      Pt has  Erica MRI SureScan  [id number] Pacemaker with the following leads.     CapSure Sense MRI SureScan  4074  58   4574  53   This pacing system is listed on [employer] MR-Conditional Product Listing as MRI Conditional.    MRI scheduled on 3/19/20 @ 2:00 pm; [employer] Rep Tali Slatre notified on today.

## 2020-03-13 DIAGNOSIS — Z95.0 CARDIAC PACEMAKER: Primary | ICD-10-CM

## 2020-03-13 NOTE — PROGRESS NOTES
Cardiac pacemaker  -     Cardiac device check - In Clinic & Hospital; Future; Expected date: 03/19/2020

## 2020-03-17 ENCOUNTER — LAB VISIT (OUTPATIENT)
Dept: LAB | Facility: HOSPITAL | Age: 75
End: 2020-03-17
Attending: INTERNAL MEDICINE
Payer: MEDICARE

## 2020-03-17 DIAGNOSIS — C61 MALIGNANT NEOPLASM OF PROSTATE: ICD-10-CM

## 2020-03-17 DIAGNOSIS — M86.18 OTHER ACUTE OSTEOMYELITIS, OTHER SITE: Primary | ICD-10-CM

## 2020-03-17 LAB
ALBUMIN SERPL BCP-MCNC: 3.7 G/DL (ref 3.5–5.2)
ALP SERPL-CCNC: 73 U/L (ref 55–135)
ALT SERPL W/O P-5'-P-CCNC: 26 U/L (ref 10–44)
ANION GAP SERPL CALC-SCNC: 7 MMOL/L (ref 8–16)
AST SERPL-CCNC: 27 U/L (ref 10–40)
BASOPHILS # BLD AUTO: 0.04 K/UL (ref 0–0.2)
BASOPHILS NFR BLD: 0.9 % (ref 0–1.9)
BILIRUB SERPL-MCNC: 0.3 MG/DL (ref 0.1–1)
BUN SERPL-MCNC: 15 MG/DL (ref 8–23)
CALCIUM SERPL-MCNC: 8.5 MG/DL (ref 8.7–10.5)
CHLORIDE SERPL-SCNC: 108 MMOL/L (ref 95–110)
CO2 SERPL-SCNC: 25 MMOL/L (ref 23–29)
CREAT SERPL-MCNC: 1.2 MG/DL (ref 0.5–1.4)
DIFFERENTIAL METHOD: ABNORMAL
EOSINOPHIL # BLD AUTO: 0.2 K/UL (ref 0–0.5)
EOSINOPHIL NFR BLD: 3.6 % (ref 0–8)
ERYTHROCYTE [DISTWIDTH] IN BLOOD BY AUTOMATED COUNT: 15.1 % (ref 11.5–14.5)
ERYTHROCYTE [SEDIMENTATION RATE] IN BLOOD BY WESTERGREN METHOD: 4 MM/HR (ref 0–10)
EST. GFR  (AFRICAN AMERICAN): >60 ML/MIN/1.73 M^2
EST. GFR  (NON AFRICAN AMERICAN): 60 ML/MIN/1.73 M^2
GLUCOSE SERPL-MCNC: 111 MG/DL (ref 70–110)
HCT VFR BLD AUTO: 32.4 % (ref 40–54)
HGB BLD-MCNC: 10.9 G/DL (ref 14–18)
IMM GRANULOCYTES # BLD AUTO: 0.01 K/UL (ref 0–0.04)
IMM GRANULOCYTES NFR BLD AUTO: 0.2 % (ref 0–0.5)
LYMPHOCYTES # BLD AUTO: 1.5 K/UL (ref 1–4.8)
LYMPHOCYTES NFR BLD: 32.2 % (ref 18–48)
MCH RBC QN AUTO: 34 PG (ref 27–31)
MCHC RBC AUTO-ENTMCNC: 33.6 G/DL (ref 32–36)
MCV RBC AUTO: 101 FL (ref 82–98)
MONOCYTES # BLD AUTO: 0.6 K/UL (ref 0.3–1)
MONOCYTES NFR BLD: 12.8 % (ref 4–15)
NEUTROPHILS # BLD AUTO: 2.4 K/UL (ref 1.8–7.7)
NEUTROPHILS NFR BLD: 50.3 % (ref 38–73)
NRBC BLD-RTO: 0 /100 WBC
PLATELET # BLD AUTO: 228 K/UL (ref 150–350)
PMV BLD AUTO: 10.2 FL (ref 9.2–12.9)
POTASSIUM SERPL-SCNC: 4.1 MMOL/L (ref 3.5–5.1)
PROT SERPL-MCNC: 6.3 G/DL (ref 6–8.4)
RBC # BLD AUTO: 3.21 M/UL (ref 4.6–6.2)
SODIUM SERPL-SCNC: 140 MMOL/L (ref 136–145)
WBC # BLD AUTO: 4.69 K/UL (ref 3.9–12.7)

## 2020-03-17 PROCEDURE — 85025 COMPLETE CBC W/AUTO DIFF WBC: CPT | Mod: HCNC

## 2020-03-17 PROCEDURE — 85651 RBC SED RATE NONAUTOMATED: CPT | Mod: HCNC

## 2020-03-17 PROCEDURE — 80053 COMPREHEN METABOLIC PANEL: CPT | Mod: HCNC

## 2020-03-17 PROCEDURE — 86140 C-REACTIVE PROTEIN: CPT | Mod: HCNC

## 2020-03-18 ENCOUNTER — TELEPHONE (OUTPATIENT)
Dept: INFECTIOUS DISEASES | Facility: CLINIC | Age: 75
End: 2020-03-18

## 2020-03-18 LAB — CRP SERPL-MCNC: 1 MG/L (ref 0–8.2)

## 2020-03-19 ENCOUNTER — TELEPHONE (OUTPATIENT)
Dept: INFECTIOUS DISEASES | Facility: CLINIC | Age: 75
End: 2020-03-19

## 2020-03-19 ENCOUNTER — CLINICAL SUPPORT (OUTPATIENT)
Dept: CARDIOLOGY | Facility: HOSPITAL | Age: 75
End: 2020-03-19
Attending: UROLOGY
Payer: MEDICARE

## 2020-03-19 ENCOUNTER — DOCUMENTATION ONLY (OUTPATIENT)
Dept: CARDIOLOGY | Facility: HOSPITAL | Age: 75
End: 2020-03-19

## 2020-03-19 ENCOUNTER — HOSPITAL ENCOUNTER (OUTPATIENT)
Dept: RADIOLOGY | Facility: HOSPITAL | Age: 75
Discharge: HOME | End: 2020-03-19
Attending: UROLOGY
Payer: MEDICARE

## 2020-03-19 VITALS — OXYGEN SATURATION: 97 % | HEART RATE: 82 BPM

## 2020-03-19 DIAGNOSIS — Z95.0 CARDIAC PACEMAKER: ICD-10-CM

## 2020-03-19 DIAGNOSIS — N39.3 STRESS INCONTINENCE OF URINE: ICD-10-CM

## 2020-03-19 PROCEDURE — A9585 GADOBUTROL INJECTION: HCPCS | Mod: HCNC | Performed by: UROLOGY

## 2020-03-19 PROCEDURE — 72197 MRI PELVIS W/O & W/DYE: CPT | Mod: TC,HCNC

## 2020-03-19 PROCEDURE — 25500020 PHARM REV CODE 255: Mod: HCNC | Performed by: UROLOGY

## 2020-03-19 PROCEDURE — 72197 MRI PELVIS W/O & W/DYE: CPT | Mod: 26,HCNC,, | Performed by: RADIOLOGY

## 2020-03-19 PROCEDURE — 72197 MRI PELVIS W WO CONTRAST: ICD-10-PCS | Mod: 26,HCNC,, | Performed by: RADIOLOGY

## 2020-03-19 RX ORDER — GADOBUTROL 604.72 MG/ML
8 INJECTION INTRAVENOUS
Status: COMPLETED | OUTPATIENT
Start: 2020-03-19 | End: 2020-03-19

## 2020-03-19 RX ADMIN — GADOBUTROL 8 ML: 604.72 INJECTION INTRAVENOUS at 02:03

## 2020-03-19 NOTE — PROGRESS NOTES
An MRI has been ordered on this patient, an IMPLANTED CARDIAC DEVICE is present.    The patient's medical record has been reviewed for the following:    -the MRI conditional system has been implanted for a minimum of 6 weeks and is considered post the lead maturation period    -there are no known lead extenders, lead adaptors, or abandoned leads in place per cxr 1/2020    -there are no known broken leads or leads with intermittent electrical contact as confirmed by the lead impedance history    -pacing capture thresholds are < 2.0 volts (V) at a pulse width of 0.40 milliseconds (ms)      measured pacing lead impedances are >/= 200 Ohms and </= 1500 Oh    Presenting rhythm SR @ 75bpm. Indication in device says syncope and heart block. RA paces 48% and RV paces 0%.  Changed from AAIR-DDDR--> DOO 85 bpm.  -there is no noted diaphragmatic stimulation at a pacing output of 5.0V and at a pulse width of 1.0ms in patients whose device will be programmed to an asynchronous pacing mode    Parameters were reprogrammed to MRI safe mode as per the manufacturers scanning guidelines; pt escorted to MRI. Will return for reprogramming and retesting post MRI.    Pt returned from MRI. Testing wnl and restored to initial settings.

## 2020-03-19 NOTE — CARE UPDATE
Patient escorted from PCM clinic after interrogated by RN. Patient alert and oriented x4 and in NAD. Patient placed on a MRI safe monitor.

## 2020-03-24 ENCOUNTER — LAB VISIT (OUTPATIENT)
Dept: LAB | Facility: HOSPITAL | Age: 75
End: 2020-03-24
Attending: INTERNAL MEDICINE
Payer: MEDICARE

## 2020-03-24 ENCOUNTER — TELEPHONE (OUTPATIENT)
Dept: UROLOGY | Facility: CLINIC | Age: 75
End: 2020-03-24

## 2020-03-24 DIAGNOSIS — C61 MALIGNANT NEOPLASM OF PROSTATE: ICD-10-CM

## 2020-03-24 DIAGNOSIS — M86.18 ACUTE OSTEOMYELITIS OF STERNUM: Primary | ICD-10-CM

## 2020-03-24 LAB
ALBUMIN SERPL BCP-MCNC: 3.8 G/DL (ref 3.5–5.2)
ALP SERPL-CCNC: 67 U/L (ref 55–135)
ALT SERPL W/O P-5'-P-CCNC: 26 U/L (ref 10–44)
ANION GAP SERPL CALC-SCNC: 9 MMOL/L (ref 8–16)
AST SERPL-CCNC: 26 U/L (ref 10–40)
BASOPHILS # BLD AUTO: 0.03 K/UL (ref 0–0.2)
BASOPHILS NFR BLD: 0.5 % (ref 0–1.9)
BILIRUB SERPL-MCNC: 0.4 MG/DL (ref 0.1–1)
BUN SERPL-MCNC: 12 MG/DL (ref 8–23)
CALCIUM SERPL-MCNC: 8.7 MG/DL (ref 8.7–10.5)
CHLORIDE SERPL-SCNC: 108 MMOL/L (ref 95–110)
CO2 SERPL-SCNC: 27 MMOL/L (ref 23–29)
CREAT SERPL-MCNC: 1.4 MG/DL (ref 0.5–1.4)
CRP SERPL-MCNC: 2.1 MG/L (ref 0–8.2)
DIFFERENTIAL METHOD: ABNORMAL
EOSINOPHIL # BLD AUTO: 0.2 K/UL (ref 0–0.5)
EOSINOPHIL NFR BLD: 3.5 % (ref 0–8)
ERYTHROCYTE [DISTWIDTH] IN BLOOD BY AUTOMATED COUNT: 15 % (ref 11.5–14.5)
ERYTHROCYTE [SEDIMENTATION RATE] IN BLOOD BY WESTERGREN METHOD: 6 MM/HR (ref 0–10)
EST. GFR  (AFRICAN AMERICAN): 57 ML/MIN/1.73 M^2
EST. GFR  (NON AFRICAN AMERICAN): 49 ML/MIN/1.73 M^2
GLUCOSE SERPL-MCNC: 108 MG/DL (ref 70–110)
HCT VFR BLD AUTO: 34.6 % (ref 40–54)
HGB BLD-MCNC: 11.4 G/DL (ref 14–18)
IMM GRANULOCYTES # BLD AUTO: 0.01 K/UL (ref 0–0.04)
IMM GRANULOCYTES NFR BLD AUTO: 0.2 % (ref 0–0.5)
LYMPHOCYTES # BLD AUTO: 1.5 K/UL (ref 1–4.8)
LYMPHOCYTES NFR BLD: 27.9 % (ref 18–48)
MCH RBC QN AUTO: 33.4 PG (ref 27–31)
MCHC RBC AUTO-ENTMCNC: 32.9 G/DL (ref 32–36)
MCV RBC AUTO: 102 FL (ref 82–98)
MONOCYTES # BLD AUTO: 0.6 K/UL (ref 0.3–1)
MONOCYTES NFR BLD: 10.9 % (ref 4–15)
NEUTROPHILS # BLD AUTO: 3.1 K/UL (ref 1.8–7.7)
NEUTROPHILS NFR BLD: 57 % (ref 38–73)
NRBC BLD-RTO: 0 /100 WBC
PLATELET # BLD AUTO: 236 K/UL (ref 150–350)
PMV BLD AUTO: 10.2 FL (ref 9.2–12.9)
POTASSIUM SERPL-SCNC: 3.9 MMOL/L (ref 3.5–5.1)
PROT SERPL-MCNC: 6.5 G/DL (ref 6–8.4)
RBC # BLD AUTO: 3.41 M/UL (ref 4.6–6.2)
SODIUM SERPL-SCNC: 144 MMOL/L (ref 136–145)
WBC # BLD AUTO: 5.48 K/UL (ref 3.9–12.7)

## 2020-03-24 PROCEDURE — 86140 C-REACTIVE PROTEIN: CPT | Mod: HCNC

## 2020-03-24 PROCEDURE — 85651 RBC SED RATE NONAUTOMATED: CPT | Mod: HCNC

## 2020-03-24 PROCEDURE — 85025 COMPLETE CBC W/AUTO DIFF WBC: CPT | Mod: HCNC

## 2020-03-24 PROCEDURE — 80053 COMPREHEN METABOLIC PANEL: CPT | Mod: HCNC

## 2020-03-24 NOTE — TELEPHONE ENCOUNTER
----- Message from Caitlyn Harry sent at 3/24/2020 11:24 AM CDT -----  Contact: James    tel:    [phone]   Caller has concerns about his upcoming appt. .   Wants to so it over the phone or computer or ipad.     Pls call to discuss and set this Up.

## 2020-03-24 NOTE — TELEPHONE ENCOUNTER
Spoke with patient. He states he has supplies at home and can change his own cathter. Dr. Wells reviewed his mri and pt was notified that he is not completely healed and that he recommends to keep catheter in another month for now. He was instructed to continue iv antibiotics and to call dr. Mancilla's office to get an extended order per .

## 2020-03-26 ENCOUNTER — PATIENT MESSAGE (OUTPATIENT)
Dept: UROLOGY | Facility: CLINIC | Age: 75
End: 2020-03-26

## 2020-03-26 ENCOUNTER — EXTERNAL HOME HEALTH (OUTPATIENT)
Dept: HOME HEALTH SERVICES | Facility: HOSPITAL | Age: 75
End: 2020-03-26
Payer: MEDICARE

## 2020-03-26 ENCOUNTER — TELEPHONE (OUTPATIENT)
Dept: UROLOGY | Facility: CLINIC | Age: 75
End: 2020-03-26

## 2020-03-26 NOTE — TELEPHONE ENCOUNTER
I talked to the pt regarding his condition.  He is feeling much better with almost no pain.  Thus he has been able to do a lot of activities that he was not able to do before.  So he believes that Abx treatment ( rocephin 2 g daily) has been working well for him.    I explained possible outcomes from current treatment.  1. 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    2. Thus I discussed the possible urinary diversion by Ileal conduit with partial cystectomy.  I explained to him that this will be the best option to help him to achieve better healing and even manage urinary incontinence that he has.    3. However, due to COVID-19 crisis, we are trying to avoid any elective surgery.  His surgery will require a inpatient hospitalization at least 3 days or more, which can be very challenging in this pandemic crisis.    4. We all agree to continue abx treatment for at least another month and re-assess and discuss his final decision.  I appreciate Dr. Mancilla, infectious disease physician in this matter helping us with abx treatment.    Gilberto Wells MD    Please give him an appt to see me in 1 month.

## 2020-03-27 ENCOUNTER — TELEPHONE (OUTPATIENT)
Dept: INFECTIOUS DISEASES | Facility: CLINIC | Age: 75
End: 2020-03-27

## 2020-03-27 NOTE — TELEPHONE ENCOUNTER
----- Message from Clover Esparza MA sent at 3/27/2020  9:16 AM CDT -----      ----- Message -----  From: Tian Mancilla MD  Sent: 3/26/2020   7:38 PM CDT  To: Keira Flor MA    Yes extend 2 weeks then we can switch to PO  ----- Message -----  From: Keira Flor MA  Sent: 3/26/2020  12:15 PM CDT  To: Tian Mancilla MD    Ok to extend?

## 2020-03-30 ENCOUNTER — LAB VISIT (OUTPATIENT)
Dept: LAB | Facility: HOSPITAL | Age: 75
End: 2020-03-30
Attending: INTERNAL MEDICINE
Payer: MEDICARE

## 2020-03-30 ENCOUNTER — TELEPHONE (OUTPATIENT)
Dept: INFECTIOUS DISEASES | Facility: CLINIC | Age: 75
End: 2020-03-30

## 2020-03-30 DIAGNOSIS — M86.9 OSTEOMYELITIS OF SYMPHYSIS PUBIS: Primary | ICD-10-CM

## 2020-03-30 DIAGNOSIS — C61 MALIGNANT NEOPLASM OF PROSTATE: ICD-10-CM

## 2020-03-30 DIAGNOSIS — M86.18 ACUTE OSTEOMYELITIS OF STERNUM: Primary | ICD-10-CM

## 2020-03-30 LAB
ALBUMIN SERPL BCP-MCNC: 4 G/DL (ref 3.5–5.2)
ALP SERPL-CCNC: 75 U/L (ref 55–135)
ALT SERPL W/O P-5'-P-CCNC: 25 U/L (ref 10–44)
ANION GAP SERPL CALC-SCNC: 13 MMOL/L (ref 8–16)
AST SERPL-CCNC: 26 U/L (ref 10–40)
BASOPHILS # BLD AUTO: 0.05 K/UL (ref 0–0.2)
BASOPHILS NFR BLD: 0.7 % (ref 0–1.9)
BILIRUB SERPL-MCNC: 0.3 MG/DL (ref 0.1–1)
BUN SERPL-MCNC: 20 MG/DL (ref 8–23)
CALCIUM SERPL-MCNC: 8.6 MG/DL (ref 8.7–10.5)
CHLORIDE SERPL-SCNC: 106 MMOL/L (ref 95–110)
CO2 SERPL-SCNC: 22 MMOL/L (ref 23–29)
CREAT SERPL-MCNC: 2.2 MG/DL (ref 0.5–1.4)
CRP SERPL-MCNC: 1.6 MG/L (ref 0–8.2)
DIFFERENTIAL METHOD: ABNORMAL
EOSINOPHIL # BLD AUTO: 0.1 K/UL (ref 0–0.5)
EOSINOPHIL NFR BLD: 1.5 % (ref 0–8)
ERYTHROCYTE [DISTWIDTH] IN BLOOD BY AUTOMATED COUNT: 14.8 % (ref 11.5–14.5)
ERYTHROCYTE [SEDIMENTATION RATE] IN BLOOD BY WESTERGREN METHOD: 7 MM/HR (ref 0–10)
EST. GFR  (AFRICAN AMERICAN): 33 ML/MIN/1.73 M^2
EST. GFR  (NON AFRICAN AMERICAN): 29 ML/MIN/1.73 M^2
GLUCOSE SERPL-MCNC: 92 MG/DL (ref 70–110)
HCT VFR BLD AUTO: 34.5 % (ref 40–54)
HGB BLD-MCNC: 11.5 G/DL (ref 14–18)
IMM GRANULOCYTES # BLD AUTO: 0.02 K/UL (ref 0–0.04)
IMM GRANULOCYTES NFR BLD AUTO: 0.3 % (ref 0–0.5)
LYMPHOCYTES # BLD AUTO: 1.3 K/UL (ref 1–4.8)
LYMPHOCYTES NFR BLD: 17.2 % (ref 18–48)
MCH RBC QN AUTO: 34 PG (ref 27–31)
MCHC RBC AUTO-ENTMCNC: 33.3 G/DL (ref 32–36)
MCV RBC AUTO: 102 FL (ref 82–98)
MONOCYTES # BLD AUTO: 0.7 K/UL (ref 0.3–1)
MONOCYTES NFR BLD: 10 % (ref 4–15)
NEUTROPHILS # BLD AUTO: 5.1 K/UL (ref 1.8–7.7)
NEUTROPHILS NFR BLD: 70.3 % (ref 38–73)
NRBC BLD-RTO: 0 /100 WBC
PLATELET # BLD AUTO: 252 K/UL (ref 150–350)
PMV BLD AUTO: 10.4 FL (ref 9.2–12.9)
POTASSIUM SERPL-SCNC: 4.3 MMOL/L (ref 3.5–5.1)
PROT SERPL-MCNC: 6.9 G/DL (ref 6–8.4)
RBC # BLD AUTO: 3.38 M/UL (ref 4.6–6.2)
SODIUM SERPL-SCNC: 141 MMOL/L (ref 136–145)
WBC # BLD AUTO: 7.31 K/UL (ref 3.9–12.7)

## 2020-03-30 PROCEDURE — G0180 MD CERTIFICATION HHA PATIENT: HCPCS | Mod: ,,, | Performed by: FAMILY MEDICINE

## 2020-03-30 PROCEDURE — 85651 RBC SED RATE NONAUTOMATED: CPT | Mod: HCNC

## 2020-03-30 PROCEDURE — 86140 C-REACTIVE PROTEIN: CPT | Mod: HCNC

## 2020-03-30 PROCEDURE — 80053 COMPREHEN METABOLIC PANEL: CPT | Mod: HCNC

## 2020-03-30 PROCEDURE — G0180 PR HOME HEALTH MD CERTIFICATION: ICD-10-PCS | Mod: ,,, | Performed by: FAMILY MEDICINE

## 2020-03-30 PROCEDURE — 85025 COMPLETE CBC W/AUTO DIFF WBC: CPT | Mod: HCNC

## 2020-03-30 PROCEDURE — 36415 COLL VENOUS BLD VENIPUNCTURE: CPT | Mod: HCNC

## 2020-03-30 NOTE — TELEPHONE ENCOUNTER
----- Message from Tian Mancilla MD sent at 3/30/2020  3:30 PM CDT -----  Needs repeat BMP in AM, his creatninine is up, please make sure he feels ok and is hydrating aggressively.

## 2020-03-31 ENCOUNTER — TELEPHONE (OUTPATIENT)
Dept: INFECTIOUS DISEASES | Facility: CLINIC | Age: 75
End: 2020-03-31

## 2020-03-31 NOTE — TELEPHONE ENCOUNTER
Patient called back and stated is feeling well. Has been doing work in the yard and hydrating. Informed to keep hydrating and drink more then at then how much drinking currently. Informed of elevated creatinine and called in orders to  to redraw lab today. Patient understood.

## 2020-04-01 ENCOUNTER — LAB VISIT (OUTPATIENT)
Dept: LAB | Facility: HOSPITAL | Age: 75
End: 2020-04-01
Attending: FAMILY MEDICINE
Payer: MEDICARE

## 2020-04-01 DIAGNOSIS — M86.18 ACUTE OSTEOMYELITIS OF STERNUM: Primary | ICD-10-CM

## 2020-04-01 LAB
ANION GAP SERPL CALC-SCNC: 10 MMOL/L (ref 8–16)
BUN SERPL-MCNC: 22 MG/DL (ref 8–23)
CALCIUM SERPL-MCNC: 8.7 MG/DL (ref 8.7–10.5)
CHLORIDE SERPL-SCNC: 106 MMOL/L (ref 95–110)
CO2 SERPL-SCNC: 25 MMOL/L (ref 23–29)
CREAT SERPL-MCNC: 1.8 MG/DL (ref 0.5–1.4)
EST. GFR  (AFRICAN AMERICAN): 42 ML/MIN/1.73 M^2
EST. GFR  (NON AFRICAN AMERICAN): 37 ML/MIN/1.73 M^2
GLUCOSE SERPL-MCNC: 109 MG/DL (ref 70–110)
POTASSIUM SERPL-SCNC: 4.4 MMOL/L (ref 3.5–5.1)
SODIUM SERPL-SCNC: 141 MMOL/L (ref 136–145)

## 2020-04-01 PROCEDURE — 36415 COLL VENOUS BLD VENIPUNCTURE: CPT | Mod: HCNC

## 2020-04-01 PROCEDURE — 80048 BASIC METABOLIC PNL TOTAL CA: CPT | Mod: HCNC

## 2020-04-02 ENCOUNTER — TELEPHONE (OUTPATIENT)
Dept: INFECTIOUS DISEASES | Facility: CLINIC | Age: 75
End: 2020-04-02

## 2020-04-06 ENCOUNTER — PATIENT MESSAGE (OUTPATIENT)
Dept: FAMILY MEDICINE | Facility: CLINIC | Age: 75
End: 2020-04-06

## 2020-04-06 ENCOUNTER — LAB VISIT (OUTPATIENT)
Dept: LAB | Facility: HOSPITAL | Age: 75
End: 2020-04-06
Attending: INTERNAL MEDICINE
Payer: MEDICARE

## 2020-04-06 DIAGNOSIS — M86.9 OSTEOMYELITIS: Primary | ICD-10-CM

## 2020-04-06 DIAGNOSIS — C61 MALIGNANT NEOPLASM OF PROSTATE: ICD-10-CM

## 2020-04-06 DIAGNOSIS — N32.2 BLADDER FISTULA: Primary | ICD-10-CM

## 2020-04-06 LAB
ALBUMIN SERPL BCP-MCNC: 3.9 G/DL (ref 3.5–5.2)
ALP SERPL-CCNC: 71 U/L (ref 55–135)
ALT SERPL W/O P-5'-P-CCNC: 25 U/L (ref 10–44)
ANION GAP SERPL CALC-SCNC: 8 MMOL/L (ref 8–16)
AST SERPL-CCNC: 26 U/L (ref 10–40)
BASOPHILS # BLD AUTO: 0.04 K/UL (ref 0–0.2)
BASOPHILS NFR BLD: 0.7 % (ref 0–1.9)
BILIRUB SERPL-MCNC: 0.3 MG/DL (ref 0.1–1)
BUN SERPL-MCNC: 10 MG/DL (ref 8–23)
CALCIUM SERPL-MCNC: 8.9 MG/DL (ref 8.7–10.5)
CHLORIDE SERPL-SCNC: 106 MMOL/L (ref 95–110)
CO2 SERPL-SCNC: 28 MMOL/L (ref 23–29)
CREAT SERPL-MCNC: 1.2 MG/DL (ref 0.5–1.4)
CRP SERPL-MCNC: 1 MG/L (ref 0–8.2)
DIFFERENTIAL METHOD: ABNORMAL
EOSINOPHIL # BLD AUTO: 0.2 K/UL (ref 0–0.5)
EOSINOPHIL NFR BLD: 4.2 % (ref 0–8)
ERYTHROCYTE [DISTWIDTH] IN BLOOD BY AUTOMATED COUNT: 14.6 % (ref 11.5–14.5)
ERYTHROCYTE [SEDIMENTATION RATE] IN BLOOD BY WESTERGREN METHOD: 8 MM/HR (ref 0–10)
EST. GFR  (AFRICAN AMERICAN): >60 ML/MIN/1.73 M^2
EST. GFR  (NON AFRICAN AMERICAN): 60 ML/MIN/1.73 M^2
GLUCOSE SERPL-MCNC: 137 MG/DL (ref 70–110)
HCT VFR BLD AUTO: 35.6 % (ref 40–54)
HGB BLD-MCNC: 11.8 G/DL (ref 14–18)
IMM GRANULOCYTES # BLD AUTO: 0.01 K/UL (ref 0–0.04)
IMM GRANULOCYTES NFR BLD AUTO: 0.2 % (ref 0–0.5)
LYMPHOCYTES # BLD AUTO: 1.6 K/UL (ref 1–4.8)
LYMPHOCYTES NFR BLD: 27.9 % (ref 18–48)
MCH RBC QN AUTO: 34.3 PG (ref 27–31)
MCHC RBC AUTO-ENTMCNC: 33.1 G/DL (ref 32–36)
MCV RBC AUTO: 104 FL (ref 82–98)
MONOCYTES # BLD AUTO: 0.5 K/UL (ref 0.3–1)
MONOCYTES NFR BLD: 9.2 % (ref 4–15)
NEUTROPHILS # BLD AUTO: 3.3 K/UL (ref 1.8–7.7)
NEUTROPHILS NFR BLD: 57.8 % (ref 38–73)
NRBC BLD-RTO: 0 /100 WBC
PLATELET # BLD AUTO: 277 K/UL (ref 150–350)
PMV BLD AUTO: 9.9 FL (ref 9.2–12.9)
POTASSIUM SERPL-SCNC: 4 MMOL/L (ref 3.5–5.1)
PROT SERPL-MCNC: 6.9 G/DL (ref 6–8.4)
RBC # BLD AUTO: 3.44 M/UL (ref 4.6–6.2)
SODIUM SERPL-SCNC: 142 MMOL/L (ref 136–145)
WBC # BLD AUTO: 5.67 K/UL (ref 3.9–12.7)

## 2020-04-06 PROCEDURE — 85025 COMPLETE CBC W/AUTO DIFF WBC: CPT | Mod: HCNC

## 2020-04-06 PROCEDURE — 86140 C-REACTIVE PROTEIN: CPT | Mod: HCNC

## 2020-04-06 PROCEDURE — 85651 RBC SED RATE NONAUTOMATED: CPT | Mod: HCNC

## 2020-04-06 PROCEDURE — 80053 COMPREHEN METABOLIC PANEL: CPT | Mod: HCNC

## 2020-04-07 NOTE — TELEPHONE ENCOUNTER
Summer,   I want him to get a second opinion on this ileal bladder conduit surgery. Mr Rosado is apprehensive about having this done. I would like him to see Jason Montanez at . Referral is in. Lets see if they take his insurance. Thanks!

## 2020-04-13 ENCOUNTER — TELEPHONE (OUTPATIENT)
Dept: INFECTIOUS DISEASES | Facility: HOSPITAL | Age: 75
End: 2020-04-13

## 2020-04-13 ENCOUNTER — PATIENT MESSAGE (OUTPATIENT)
Dept: FAMILY MEDICINE | Facility: CLINIC | Age: 75
End: 2020-04-13

## 2020-04-13 ENCOUNTER — LAB VISIT (OUTPATIENT)
Dept: LAB | Facility: HOSPITAL | Age: 75
End: 2020-04-13
Attending: INTERNAL MEDICINE
Payer: MEDICARE

## 2020-04-13 ENCOUNTER — PATIENT MESSAGE (OUTPATIENT)
Dept: INFECTIOUS DISEASES | Facility: CLINIC | Age: 75
End: 2020-04-13

## 2020-04-13 ENCOUNTER — TELEPHONE (OUTPATIENT)
Dept: INFECTIOUS DISEASES | Facility: CLINIC | Age: 75
End: 2020-04-13

## 2020-04-13 DIAGNOSIS — M86.18 ACUTE OSTEOMYELITIS OF STERNUM: Primary | ICD-10-CM

## 2020-04-13 DIAGNOSIS — C61 MALIGNANT NEOPLASM OF PROSTATE: ICD-10-CM

## 2020-04-13 LAB
ALBUMIN SERPL BCP-MCNC: 4.2 G/DL (ref 3.5–5.2)
ALP SERPL-CCNC: 73 U/L (ref 55–135)
ALT SERPL W/O P-5'-P-CCNC: 33 U/L (ref 10–44)
ANION GAP SERPL CALC-SCNC: 8 MMOL/L (ref 8–16)
AST SERPL-CCNC: 30 U/L (ref 10–40)
BASOPHILS # BLD AUTO: 0.03 K/UL (ref 0–0.2)
BASOPHILS NFR BLD: 0.5 % (ref 0–1.9)
BILIRUB SERPL-MCNC: 0.5 MG/DL (ref 0.1–1)
BUN SERPL-MCNC: 12 MG/DL (ref 8–23)
CALCIUM SERPL-MCNC: 9 MG/DL (ref 8.7–10.5)
CHLORIDE SERPL-SCNC: 105 MMOL/L (ref 95–110)
CO2 SERPL-SCNC: 28 MMOL/L (ref 23–29)
CREAT SERPL-MCNC: 1.2 MG/DL (ref 0.5–1.4)
CRP SERPL-MCNC: 0.9 MG/L (ref 0–8.2)
DIFFERENTIAL METHOD: ABNORMAL
EOSINOPHIL # BLD AUTO: 0.2 K/UL (ref 0–0.5)
EOSINOPHIL NFR BLD: 3.2 % (ref 0–8)
ERYTHROCYTE [DISTWIDTH] IN BLOOD BY AUTOMATED COUNT: 14.6 % (ref 11.5–14.5)
ERYTHROCYTE [SEDIMENTATION RATE] IN BLOOD BY WESTERGREN METHOD: 4 MM/HR (ref 0–10)
EST. GFR  (AFRICAN AMERICAN): >60 ML/MIN/1.73 M^2
EST. GFR  (NON AFRICAN AMERICAN): 60 ML/MIN/1.73 M^2
GLUCOSE SERPL-MCNC: 85 MG/DL (ref 70–110)
HCT VFR BLD AUTO: 35.2 % (ref 40–54)
HGB BLD-MCNC: 11.6 G/DL (ref 14–18)
IMM GRANULOCYTES # BLD AUTO: 0.01 K/UL (ref 0–0.04)
IMM GRANULOCYTES NFR BLD AUTO: 0.2 % (ref 0–0.5)
LYMPHOCYTES # BLD AUTO: 1.5 K/UL (ref 1–4.8)
LYMPHOCYTES NFR BLD: 25.9 % (ref 18–48)
MCH RBC QN AUTO: 34 PG (ref 27–31)
MCHC RBC AUTO-ENTMCNC: 33 G/DL (ref 32–36)
MCV RBC AUTO: 103 FL (ref 82–98)
MONOCYTES # BLD AUTO: 0.5 K/UL (ref 0.3–1)
MONOCYTES NFR BLD: 9 % (ref 4–15)
NEUTROPHILS # BLD AUTO: 3.5 K/UL (ref 1.8–7.7)
NEUTROPHILS NFR BLD: 61.2 % (ref 38–73)
NRBC BLD-RTO: 0 /100 WBC
PLATELET # BLD AUTO: 267 K/UL (ref 150–350)
PMV BLD AUTO: 10.2 FL (ref 9.2–12.9)
POTASSIUM SERPL-SCNC: 3.8 MMOL/L (ref 3.5–5.1)
PROT SERPL-MCNC: 7 G/DL (ref 6–8.4)
RBC # BLD AUTO: 3.41 M/UL (ref 4.6–6.2)
SODIUM SERPL-SCNC: 141 MMOL/L (ref 136–145)
WBC # BLD AUTO: 5.64 K/UL (ref 3.9–12.7)

## 2020-04-13 PROCEDURE — 85651 RBC SED RATE NONAUTOMATED: CPT | Mod: HCNC

## 2020-04-13 PROCEDURE — 85025 COMPLETE CBC W/AUTO DIFF WBC: CPT | Mod: HCNC

## 2020-04-13 PROCEDURE — 80053 COMPREHEN METABOLIC PANEL: CPT | Mod: HCNC

## 2020-04-13 PROCEDURE — 86140 C-REACTIVE PROTEIN: CPT | Mod: HCNC

## 2020-04-13 RX ORDER — CEFADROXIL 500 MG/1
500 CAPSULE ORAL EVERY 12 HOURS
Qty: 42 CAPSULE | Refills: 0 | Status: SHIPPED | OUTPATIENT
Start: 2020-04-13 | End: 2020-05-04

## 2020-04-13 NOTE — TELEPHONE ENCOUNTER
Stop IV abx has had over 8 weeks.  Pull PICC  Transition to PO cefadroxil x 2-4 weeks while undergoing evaluation for options related to operative intervention

## 2020-04-13 NOTE — TELEPHONE ENCOUNTER
Called jayme with [employer] and informed ok to end iv abx therapy and ok to pull line per . Called patient and informed  stated ok to end iv abx therapy but to start taking oral abx therapy which has been sent into patient's pharmacy. Patient understood.

## 2020-04-13 NOTE — TELEPHONE ENCOUNTER
----- Message from Tian Mancilla MD sent at 4/13/2020  2:02 PM CDT -----  Contact: jayme with [facility]  Stop, pull PICC, call him to tell him about PO abx I sent to [facility] as he figures out what next surgical steps are.  Thanks!  ----- Message -----  From: Keira Flor MA  Sent: 4/10/2020  11:29 PM CDT  To: MD Jayme Ivey called, today was this pt's end of care. Do you want to continue his IV ABX?  Please advice

## 2020-04-16 ENCOUNTER — PATIENT MESSAGE (OUTPATIENT)
Dept: FAMILY MEDICINE | Facility: CLINIC | Age: 75
End: 2020-04-16

## 2020-04-20 ENCOUNTER — DOCUMENT SCAN (OUTPATIENT)
Dept: HOME HEALTH SERVICES | Facility: HOSPITAL | Age: 75
End: 2020-04-20
Payer: MEDICARE

## 2020-04-20 RX ORDER — ROSUVASTATIN CALCIUM 10 MG/1
10 TABLET, COATED ORAL NIGHTLY
Qty: 30 TABLET | Refills: 5 | Status: SHIPPED | OUTPATIENT
Start: 2020-04-20 | End: 2020-11-15

## 2020-04-21 ENCOUNTER — PATIENT MESSAGE (OUTPATIENT)
Dept: FAMILY MEDICINE | Facility: CLINIC | Age: 75
End: 2020-04-21

## 2020-04-22 ENCOUNTER — PATIENT MESSAGE (OUTPATIENT)
Dept: FAMILY MEDICINE | Facility: CLINIC | Age: 75
End: 2020-04-22

## 2020-05-04 ENCOUNTER — PATIENT MESSAGE (OUTPATIENT)
Dept: FAMILY MEDICINE | Facility: CLINIC | Age: 75
End: 2020-05-04

## 2020-05-04 ENCOUNTER — OFFICE VISIT (OUTPATIENT)
Dept: WOUND CARE | Facility: HOSPITAL | Age: 75
End: 2020-05-04
Attending: SURGERY
Payer: MEDICARE

## 2020-05-04 VITALS
SYSTOLIC BLOOD PRESSURE: 131 MMHG | TEMPERATURE: 98 F | HEART RATE: 60 BPM | RESPIRATION RATE: 18 BRPM | DIASTOLIC BLOOD PRESSURE: 75 MMHG

## 2020-05-04 DIAGNOSIS — N36.0 URETHRAL FISTULA: ICD-10-CM

## 2020-05-04 DIAGNOSIS — E03.9 HYPOTHYROIDISM, UNSPECIFIED TYPE: ICD-10-CM

## 2020-05-04 PROCEDURE — 99499 UNLISTED E&M SERVICE: CPT | Mod: HCNC,,, | Performed by: SURGERY

## 2020-05-04 PROCEDURE — 99213 OFFICE O/P EST LOW 20 MIN: CPT | Mod: HCNC

## 2020-05-04 PROCEDURE — 99499 NO LOS: ICD-10-PCS | Mod: HCNC,,, | Performed by: SURGERY

## 2020-05-04 RX ORDER — LOSARTAN POTASSIUM 50 MG/1
50 TABLET ORAL DAILY
COMMUNITY
End: 2020-11-27 | Stop reason: ALTCHOICE

## 2020-05-04 NOTE — ASSESSMENT & PLAN NOTE
Patient does not have a wound.  He is not in the appropriate clinic.  Patient will be referred to the appropriate clinic for hyperbaric oxygen therapy.

## 2020-05-04 NOTE — PROGRESS NOTES
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  Patient was actually referred to hyperbaric oxygen therapy.  Patient came to [facility] for an appointment in the wound clinic.  We do not provide hyperbaric oxygen therapy.  The patient will be directed to the appropriate therapy location had [facility].         Current Assessment & Plan     Patient does not have a wound.  He is not in the appropriate clinic.  Patient will be referred to the appropriate clinic for hyperbaric oxygen therapy.               See wound doc progress notes. Documents will be scanned.        Adrián Rodgers  [facility]
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
## 2020-05-06 PROBLEM — L59.8 RADIATION NECROSIS OF SKIN AND SUBCUTANEOUS: Status: ACTIVE | Noted: 2020-05-06

## 2020-05-07 RX ORDER — LEVOTHYROXINE SODIUM 100 UG/1
100 TABLET ORAL DAILY
Qty: 90 TABLET | Refills: 1 | Status: SHIPPED | OUTPATIENT
Start: 2020-05-07 | End: 2020-11-13

## 2020-05-07 NOTE — TELEPHONE ENCOUNTER
Subject: RE: Prescription  I am edilma sorry for the inconvenience but  will actually be out of the office Thursday (5/7/2020) and Friday (5/08/2020). He will return on Monday but I don't have any morning appointments.   I can move it later and have  send in a refill for your medication if you would like?

## 2020-05-19 ENCOUNTER — TELEPHONE (OUTPATIENT)
Dept: FAMILY MEDICINE | Facility: CLINIC | Age: 75
End: 2020-05-19

## 2020-05-19 DIAGNOSIS — E03.9 HYPOTHYROIDISM, UNSPECIFIED TYPE: Primary | ICD-10-CM

## 2020-05-19 NOTE — TELEPHONE ENCOUNTER
----- Message from Summer Castillo LPN sent at 5/19/2020  1:27 PM CDT -----  Contact: self      ----- Message -----  From: Deisy Banegas  Sent: 5/19/2020   1:08 PM CDT  To: Ary CARABALLO Staff    Pt with like to see about getting orders for TSH to have done in clinic.    Phone: [phone]

## 2020-05-20 ENCOUNTER — CLINICAL SUPPORT (OUTPATIENT)
Dept: FAMILY MEDICINE | Facility: CLINIC | Age: 75
End: 2020-05-20
Payer: MEDICARE

## 2020-05-20 DIAGNOSIS — E03.9 HYPOTHYROIDISM, UNSPECIFIED TYPE: ICD-10-CM

## 2020-05-20 LAB
T4 FREE SERPL-MCNC: 1.26 NG/DL (ref 0.71–1.51)
TSH SERPL DL<=0.005 MIU/L-ACNC: 0.32 UIU/ML (ref 0.4–4)

## 2020-05-20 PROCEDURE — 84443 ASSAY THYROID STIM HORMONE: CPT | Mod: HCNC

## 2020-05-20 PROCEDURE — 84439 ASSAY OF FREE THYROXINE: CPT | Mod: HCNC

## 2020-05-20 PROCEDURE — 36415 COLL VENOUS BLD VENIPUNCTURE: CPT | Mod: HCNC,S$GLB,, | Performed by: FAMILY MEDICINE

## 2020-05-20 PROCEDURE — 36415 PR COLLECTION VENOUS BLOOD,VENIPUNCTURE: ICD-10-PCS | Mod: HCNC,S$GLB,, | Performed by: FAMILY MEDICINE

## 2020-06-01 ENCOUNTER — EXTERNAL HOME HEALTH (OUTPATIENT)
Dept: HOME HEALTH SERVICES | Facility: HOSPITAL | Age: 75
End: 2020-06-01
Payer: MEDICARE

## 2020-06-01 NOTE — PROGRESS NOTES
60 Day Recert Order # [id number]  New Cert Period: 03/30 to 05/28/2020 with [facility] - Dr. Alex Mcallister

## 2020-06-15 ENCOUNTER — PATIENT MESSAGE (OUTPATIENT)
Dept: FAMILY MEDICINE | Facility: CLINIC | Age: 75
End: 2020-06-15

## 2020-06-16 RX ORDER — GABAPENTIN 300 MG/1
300 CAPSULE ORAL 3 TIMES DAILY
Qty: 90 CAPSULE | Refills: 5 | Status: SHIPPED | OUTPATIENT
Start: 2020-06-16 | End: 2020-11-27 | Stop reason: ALTCHOICE

## 2020-06-24 ENCOUNTER — PATIENT MESSAGE (OUTPATIENT)
Dept: FAMILY MEDICINE | Facility: CLINIC | Age: 75
End: 2020-06-24

## 2020-06-25 RX ORDER — OXYCODONE AND ACETAMINOPHEN 5; 325 MG/1; MG/1
1 TABLET ORAL EVERY 6 HOURS PRN
Qty: 60 EACH | Refills: 0 | Status: SHIPPED | OUTPATIENT
Start: 2020-06-25 | End: 2020-07-09 | Stop reason: SDUPTHER

## 2020-07-06 ENCOUNTER — OFFICE VISIT (OUTPATIENT)
Dept: FAMILY MEDICINE | Facility: CLINIC | Age: 75
End: 2020-07-06
Payer: MEDICARE

## 2020-07-06 DIAGNOSIS — N39.0 URINARY TRACT INFECTION WITHOUT HEMATURIA, SITE UNSPECIFIED: Primary | ICD-10-CM

## 2020-07-06 PROCEDURE — 99213 OFFICE O/P EST LOW 20 MIN: CPT | Mod: HCNC,S$GLB,, | Performed by: FAMILY MEDICINE

## 2020-07-06 PROCEDURE — 99999 PR PBB SHADOW E&M-EST. PATIENT-LVL III: CPT | Mod: PBBFAC,HCNC,, | Performed by: FAMILY MEDICINE

## 2020-07-06 PROCEDURE — 1101F PR PT FALLS ASSESS DOC 0-1 FALLS W/OUT INJ PAST YR: ICD-10-PCS | Mod: HCNC,CPTII,S$GLB, | Performed by: FAMILY MEDICINE

## 2020-07-06 PROCEDURE — 1159F MED LIST DOCD IN RCRD: CPT | Mod: HCNC,S$GLB,, | Performed by: FAMILY MEDICINE

## 2020-07-06 PROCEDURE — 99213 PR OFFICE/OUTPT VISIT, EST, LEVL III, 20-29 MIN: ICD-10-PCS | Mod: HCNC,S$GLB,, | Performed by: FAMILY MEDICINE

## 2020-07-06 PROCEDURE — 3074F PR MOST RECENT SYSTOLIC BLOOD PRESSURE < 130 MM HG: ICD-10-PCS | Mod: HCNC,CPTII,S$GLB, | Performed by: FAMILY MEDICINE

## 2020-07-06 PROCEDURE — 1159F PR MEDICATION LIST DOCUMENTED IN MEDICAL RECORD: ICD-10-PCS | Mod: HCNC,S$GLB,, | Performed by: FAMILY MEDICINE

## 2020-07-06 PROCEDURE — 3074F SYST BP LT 130 MM HG: CPT | Mod: HCNC,CPTII,S$GLB, | Performed by: FAMILY MEDICINE

## 2020-07-06 PROCEDURE — 99999 PR PBB SHADOW E&M-EST. PATIENT-LVL III: ICD-10-PCS | Mod: PBBFAC,HCNC,, | Performed by: FAMILY MEDICINE

## 2020-07-06 PROCEDURE — 3078F PR MOST RECENT DIASTOLIC BLOOD PRESSURE < 80 MM HG: ICD-10-PCS | Mod: HCNC,CPTII,S$GLB, | Performed by: FAMILY MEDICINE

## 2020-07-06 PROCEDURE — 1101F PT FALLS ASSESS-DOCD LE1/YR: CPT | Mod: HCNC,CPTII,S$GLB, | Performed by: FAMILY MEDICINE

## 2020-07-06 PROCEDURE — 3078F DIAST BP <80 MM HG: CPT | Mod: HCNC,CPTII,S$GLB, | Performed by: FAMILY MEDICINE

## 2020-07-06 RX ORDER — CEFPODOXIME PROXETIL 100 MG/1
100 TABLET, FILM COATED ORAL 2 TIMES DAILY
Qty: 20 TABLET | Refills: 0 | Status: SHIPPED | OUTPATIENT
Start: 2020-07-06 | End: 2020-11-27 | Stop reason: ALTCHOICE

## 2020-07-07 NOTE — PROGRESS NOTES
Subjective:       Patient ID: James Quan Jr. is a 74 y.o. male.    Chief Complaint: Urinary Tract Infection    Pt is a 74 y.o. male who presents for evaluation and management of   Encounter Diagnosis   Name Primary?    Urinary tract infection without hematuria, site unspecified Yes   .  Doing well on current meds. Denies any side effects. Prevention is up to date.    Review of Systems   Constitutional: Positive for fever.   Genitourinary:        Foul smelling urine        Objective:      Physical Exam  Constitutional:       Appearance: He is well-developed.   HENT:      Head: Normocephalic and atraumatic.      Right Ear: External ear normal.      Left Ear: External ear normal.      Nose: Nose normal.   Eyes:      General: No scleral icterus.        Right eye: No discharge.         Left eye: No discharge.      Conjunctiva/sclera: Conjunctivae normal.      Pupils: Pupils are equal, round, and reactive to light.   Neck:      Musculoskeletal: Normal range of motion and neck supple.      Thyroid: No thyromegaly.      Vascular: No JVD.      Trachea: No tracheal deviation.   Cardiovascular:      Rate and Rhythm: Normal rate and regular rhythm.      Heart sounds: Normal heart sounds. No murmur.   Pulmonary:      Effort: Pulmonary effort is normal. No respiratory distress.      Breath sounds: Normal breath sounds. No wheezing or rales.   Chest:      Chest wall: No tenderness.   Abdominal:      General: Bowel sounds are normal. There is no distension.      Palpations: Abdomen is soft. There is no mass.      Tenderness: There is no abdominal tenderness. There is no guarding or rebound.   Genitourinary:     Comments: Foul urine in catheter bag on ankle   Musculoskeletal: Normal range of motion.   Lymphadenopathy:      Cervical: No cervical adenopathy.   Skin:     General: Skin is warm and dry.   Neurological:      Mental Status: He is alert and oriented to person, place, and time.      Cranial Nerves: No cranial  nerve deficit.      Motor: No abnormal muscle tone.      Coordination: Coordination normal.      Deep Tendon Reflexes: Reflexes are normal and symmetric. Reflexes normal.   Psychiatric:         Behavior: Behavior normal.         Thought Content: Thought content normal.         Judgment: Judgment normal.         Assessment:       1. Urinary tract infection without hematuria, site unspecified        Plan:   James was seen today for urinary tract infection.    Diagnoses and all orders for this visit:    Urinary tract infection without hematuria, site unspecified  -     Urine culture  -     cefpodoxime (VANTIN) 100 MG tablet; Take 1 tablet (100 mg total) by mouth 2 (two) times daily.      Problem List Items Addressed This Visit     None      Visit Diagnoses     Urinary tract infection without hematuria, site unspecified    -  Primary    Relevant Medications    cefpodoxime (VANTIN) 100 MG tablet    Other Relevant Orders    Urine culture      complicated UTI since fever. Treating for 10 days   Surgery is on the 17th  No follow-ups on file.

## 2020-07-09 ENCOUNTER — PATIENT MESSAGE (OUTPATIENT)
Dept: FAMILY MEDICINE | Facility: CLINIC | Age: 75
End: 2020-07-09

## 2020-07-09 RX ORDER — OXYCODONE AND ACETAMINOPHEN 5; 325 MG/1; MG/1
1 TABLET ORAL EVERY 6 HOURS PRN
Qty: 60 EACH | Refills: 0 | Status: SHIPPED | OUTPATIENT
Start: 2020-07-09 | End: 2020-12-02 | Stop reason: SDUPTHER

## 2020-07-09 NOTE — TELEPHONE ENCOUNTER
I have enough generic Percocet to last until Sunday so Im requesting a refill ...thank you    LOV: 07/06/2020    Last Rx: 06/25/2020

## 2020-08-10 ENCOUNTER — OFFICE VISIT (OUTPATIENT)
Dept: FAMILY MEDICINE | Facility: CLINIC | Age: 75
End: 2020-08-10
Payer: MEDICARE

## 2020-08-10 VITALS
TEMPERATURE: 100 F | HEART RATE: 94 BPM | WEIGHT: 151.44 LBS | HEIGHT: 66 IN | SYSTOLIC BLOOD PRESSURE: 126 MMHG | DIASTOLIC BLOOD PRESSURE: 74 MMHG | BODY MASS INDEX: 24.34 KG/M2 | RESPIRATION RATE: 16 BRPM

## 2020-08-10 DIAGNOSIS — Z87.448 H/O URETHRAL STRICTURE: ICD-10-CM

## 2020-08-10 DIAGNOSIS — C92.10 CML (CHRONIC MYELOCYTIC LEUKEMIA): ICD-10-CM

## 2020-08-10 DIAGNOSIS — R53.83 FATIGUE, UNSPECIFIED TYPE: Primary | ICD-10-CM

## 2020-08-10 DIAGNOSIS — C61 CA PROSTATE, ADENOCA: ICD-10-CM

## 2020-08-10 DIAGNOSIS — R53.81 DEBILITY: ICD-10-CM

## 2020-08-10 PROCEDURE — 3074F SYST BP LT 130 MM HG: CPT | Mod: HCNC,CPTII,S$GLB, | Performed by: FAMILY MEDICINE

## 2020-08-10 PROCEDURE — 99214 PR OFFICE/OUTPT VISIT, EST, LEVL IV, 30-39 MIN: ICD-10-PCS | Mod: HCNC,S$GLB,, | Performed by: FAMILY MEDICINE

## 2020-08-10 PROCEDURE — 99499 UNLISTED E&M SERVICE: CPT | Mod: HCNC,S$GLB,, | Performed by: FAMILY MEDICINE

## 2020-08-10 PROCEDURE — 1101F PR PT FALLS ASSESS DOC 0-1 FALLS W/OUT INJ PAST YR: ICD-10-PCS | Mod: HCNC,CPTII,S$GLB, | Performed by: FAMILY MEDICINE

## 2020-08-10 PROCEDURE — 3078F PR MOST RECENT DIASTOLIC BLOOD PRESSURE < 80 MM HG: ICD-10-PCS | Mod: HCNC,CPTII,S$GLB, | Performed by: FAMILY MEDICINE

## 2020-08-10 PROCEDURE — 3078F DIAST BP <80 MM HG: CPT | Mod: HCNC,CPTII,S$GLB, | Performed by: FAMILY MEDICINE

## 2020-08-10 PROCEDURE — 99499 RISK ADDL DX/OHS AUDIT: ICD-10-PCS | Mod: HCNC,S$GLB,, | Performed by: FAMILY MEDICINE

## 2020-08-10 PROCEDURE — 99999 PR PBB SHADOW E&M-EST. PATIENT-LVL IV: ICD-10-PCS | Mod: PBBFAC,HCNC,, | Performed by: FAMILY MEDICINE

## 2020-08-10 PROCEDURE — 99214 OFFICE O/P EST MOD 30 MIN: CPT | Mod: HCNC,S$GLB,, | Performed by: FAMILY MEDICINE

## 2020-08-10 PROCEDURE — 3008F BODY MASS INDEX DOCD: CPT | Mod: HCNC,CPTII,S$GLB, | Performed by: FAMILY MEDICINE

## 2020-08-10 PROCEDURE — 3074F PR MOST RECENT SYSTOLIC BLOOD PRESSURE < 130 MM HG: ICD-10-PCS | Mod: HCNC,CPTII,S$GLB, | Performed by: FAMILY MEDICINE

## 2020-08-10 PROCEDURE — 1159F MED LIST DOCD IN RCRD: CPT | Mod: HCNC,S$GLB,, | Performed by: FAMILY MEDICINE

## 2020-08-10 PROCEDURE — 1159F PR MEDICATION LIST DOCUMENTED IN MEDICAL RECORD: ICD-10-PCS | Mod: HCNC,S$GLB,, | Performed by: FAMILY MEDICINE

## 2020-08-10 PROCEDURE — 1101F PT FALLS ASSESS-DOCD LE1/YR: CPT | Mod: HCNC,CPTII,S$GLB, | Performed by: FAMILY MEDICINE

## 2020-08-10 PROCEDURE — 99999 PR PBB SHADOW E&M-EST. PATIENT-LVL IV: CPT | Mod: PBBFAC,HCNC,, | Performed by: FAMILY MEDICINE

## 2020-08-10 PROCEDURE — 3008F PR BODY MASS INDEX (BMI) DOCUMENTED: ICD-10-PCS | Mod: HCNC,CPTII,S$GLB, | Performed by: FAMILY MEDICINE

## 2020-08-10 RX ORDER — CIPROFLOXACIN 500 MG/1
TABLET ORAL
COMMUNITY
Start: 2020-08-07 | End: 2020-09-29 | Stop reason: ALTCHOICE

## 2020-08-10 NOTE — PROGRESS NOTES
Subjective:       Patient ID: James Quan Jr. is a 75 y.o. male.    Chief Complaint: Wound Check (draining by pubic area 7.5 pain in area)    Pt is a 75 y.o. male who presents for evaluation and management of   Encounter Diagnoses   Name Primary?    Fatigue, unspecified type Yes    H/O urethral stricture     CML (chronic myelocytic leukemia)     CA prostate, adenoca     Debility    .s/p urethral surgery at . If this fails will need illeal conduit   He is extremely fatigued since surgery   SOB and fatigue with simple ADL's     Doing well on current meds. Denies any side effects. Prevention is up to date.    Review of Systems   Constitutional: Positive for fatigue. Negative for fever.   Respiratory: Positive for shortness of breath.    Genitourinary:        Has adamson catheter    Neurological: Positive for weakness.        Globally weak        Objective:      Physical Exam  Constitutional:       Appearance: He is well-developed.   HENT:      Head: Normocephalic and atraumatic.      Right Ear: External ear normal.      Left Ear: External ear normal.      Nose: Nose normal.   Eyes:      General: No scleral icterus.        Right eye: No discharge.         Left eye: No discharge.      Conjunctiva/sclera: Conjunctivae normal.      Pupils: Pupils are equal, round, and reactive to light.   Neck:      Musculoskeletal: Normal range of motion and neck supple.      Thyroid: No thyromegaly.      Vascular: No JVD.      Trachea: No tracheal deviation.   Cardiovascular:      Rate and Rhythm: Normal rate and regular rhythm.      Heart sounds: Normal heart sounds. No murmur.   Pulmonary:      Effort: Pulmonary effort is normal. No respiratory distress.      Breath sounds: Normal breath sounds. No wheezing or rales.   Chest:      Chest wall: No tenderness.   Abdominal:      General: Bowel sounds are normal. There is no distension.      Palpations: Abdomen is soft. There is no mass.      Tenderness: There is no  abdominal tenderness. There is no guarding or rebound.   Genitourinary:     Comments: Saldana catheter to gravity     Well healing surgical wounds   Musculoskeletal: Normal range of motion.   Lymphadenopathy:      Cervical: No cervical adenopathy.   Skin:     General: Skin is warm and dry.   Neurological:      Mental Status: He is alert and oriented to person, place, and time.      Cranial Nerves: No cranial nerve deficit.      Motor: No abnormal muscle tone.      Coordination: Coordination normal.      Deep Tendon Reflexes: Reflexes are normal and symmetric. Reflexes normal.   Psychiatric:         Behavior: Behavior normal.         Thought Content: Thought content normal.         Judgment: Judgment normal.         Assessment:       1. Fatigue, unspecified type    2. H/O urethral stricture    3. CML (chronic myelocytic leukemia)    4. CA prostate, adenoca    5. Debility        Plan:   James was seen today for wound check.    Diagnoses and all orders for this visit:    Fatigue, unspecified type  -     CBC auto differential; Future  -     Comprehensive metabolic panel; Future  -     TSH; Future    H/O urethral stricture    CML (chronic myelocytic leukemia)    CA prostate, adenoca    Debility  -     SUBSEQUENT HOME HEALTH ORDERS      Problem List Items Addressed This Visit     CA prostate, adenoca    Overview     S/p radiation          CML (chronic myelocytic leukemia)    Overview     Sees dr beaulieu   On [facility]           Other Visit Diagnoses     Fatigue, unspecified type    -  Primary    Relevant Orders    CBC auto differential    Comprehensive metabolic panel    TSH    H/O urethral stricture        Debility        Relevant Orders    SUBSEQUENT HOME HEALTH ORDERS        No follow-ups on file.

## 2020-08-11 ENCOUNTER — LAB VISIT (OUTPATIENT)
Dept: LAB | Facility: HOSPITAL | Age: 75
End: 2020-08-11
Attending: FAMILY MEDICINE
Payer: MEDICARE

## 2020-08-11 DIAGNOSIS — C92.10 CML (CHRONIC MYELOCYTIC LEUKEMIA): Primary | ICD-10-CM

## 2020-08-11 DIAGNOSIS — I10 HYPERTENSION: ICD-10-CM

## 2020-08-11 LAB
ALBUMIN SERPL BCP-MCNC: 2.7 G/DL (ref 3.5–5.2)
ALP SERPL-CCNC: 91 U/L (ref 55–135)
ALT SERPL W/O P-5'-P-CCNC: 25 U/L (ref 10–44)
ANION GAP SERPL CALC-SCNC: 8 MMOL/L (ref 8–16)
AST SERPL-CCNC: 21 U/L (ref 10–40)
BASOPHILS # BLD AUTO: 0.07 K/UL (ref 0–0.2)
BASOPHILS NFR BLD: 0.5 % (ref 0–1.9)
BILIRUB SERPL-MCNC: 0.3 MG/DL (ref 0.1–1)
BUN SERPL-MCNC: 17 MG/DL (ref 8–23)
CALCIUM SERPL-MCNC: 8.7 MG/DL (ref 8.7–10.5)
CHLORIDE SERPL-SCNC: 103 MMOL/L (ref 95–110)
CO2 SERPL-SCNC: 28 MMOL/L (ref 23–29)
CREAT SERPL-MCNC: 1.3 MG/DL (ref 0.5–1.4)
DIFFERENTIAL METHOD: ABNORMAL
EOSINOPHIL # BLD AUTO: 0.4 K/UL (ref 0–0.5)
EOSINOPHIL NFR BLD: 2.8 % (ref 0–8)
ERYTHROCYTE [DISTWIDTH] IN BLOOD BY AUTOMATED COUNT: 14.8 % (ref 11.5–14.5)
EST. GFR  (AFRICAN AMERICAN): >60 ML/MIN/1.73 M^2
EST. GFR  (NON AFRICAN AMERICAN): 54 ML/MIN/1.73 M^2
GLUCOSE SERPL-MCNC: 98 MG/DL (ref 70–110)
HCT VFR BLD AUTO: 27.2 % (ref 40–54)
HGB BLD-MCNC: 8.7 G/DL (ref 14–18)
IMM GRANULOCYTES # BLD AUTO: 0.05 K/UL (ref 0–0.04)
IMM GRANULOCYTES NFR BLD AUTO: 0.4 % (ref 0–0.5)
LYMPHOCYTES # BLD AUTO: 1.5 K/UL (ref 1–4.8)
LYMPHOCYTES NFR BLD: 11.2 % (ref 18–48)
MCH RBC QN AUTO: 30.5 PG (ref 27–31)
MCHC RBC AUTO-ENTMCNC: 32 G/DL (ref 32–36)
MCV RBC AUTO: 95 FL (ref 82–98)
MONOCYTES # BLD AUTO: 1.1 K/UL (ref 0.3–1)
MONOCYTES NFR BLD: 8.4 % (ref 4–15)
NEUTROPHILS # BLD AUTO: 10.1 K/UL (ref 1.8–7.7)
NEUTROPHILS NFR BLD: 76.7 % (ref 38–73)
NRBC BLD-RTO: 0 /100 WBC
PLATELET # BLD AUTO: 517 K/UL (ref 150–350)
PMV BLD AUTO: 9.4 FL (ref 9.2–12.9)
POTASSIUM SERPL-SCNC: 3.9 MMOL/L (ref 3.5–5.1)
PROT SERPL-MCNC: 7 G/DL (ref 6–8.4)
RBC # BLD AUTO: 2.85 M/UL (ref 4.6–6.2)
SODIUM SERPL-SCNC: 139 MMOL/L (ref 136–145)
TSH SERPL DL<=0.005 MIU/L-ACNC: 1.79 UIU/ML (ref 0.4–4)
WBC # BLD AUTO: 13.22 K/UL (ref 3.9–12.7)

## 2020-08-11 PROCEDURE — G0180 MD CERTIFICATION HHA PATIENT: HCPCS | Mod: ,,, | Performed by: FAMILY MEDICINE

## 2020-08-11 PROCEDURE — 85025 COMPLETE CBC W/AUTO DIFF WBC: CPT | Mod: HCNC

## 2020-08-11 PROCEDURE — 80053 COMPREHEN METABOLIC PANEL: CPT | Mod: HCNC

## 2020-08-11 PROCEDURE — G0180 PR HOME HEALTH MD CERTIFICATION: ICD-10-PCS | Mod: ,,, | Performed by: FAMILY MEDICINE

## 2020-08-11 PROCEDURE — 84443 ASSAY THYROID STIM HORMONE: CPT | Mod: HCNC

## 2020-09-10 ENCOUNTER — DOCUMENT SCAN (OUTPATIENT)
Dept: HOME HEALTH SERVICES | Facility: HOSPITAL | Age: 75
End: 2020-09-10
Payer: MEDICARE

## 2020-09-10 PROCEDURE — 99499 NO LOS: ICD-10-PCS | Mod: ,,, | Performed by: FAMILY MEDICINE

## 2020-09-10 PROCEDURE — 99499 UNLISTED E&M SERVICE: CPT | Mod: ,,, | Performed by: FAMILY MEDICINE

## 2020-09-11 ENCOUNTER — EXTERNAL HOME HEALTH (OUTPATIENT)
Dept: HOME HEALTH SERVICES | Facility: HOSPITAL | Age: 75
End: 2020-09-11
Payer: MEDICARE

## 2020-09-16 ENCOUNTER — PATIENT OUTREACH (OUTPATIENT)
Dept: ADMINISTRATIVE | Facility: CLINIC | Age: 75
End: 2020-09-16

## 2020-09-16 ENCOUNTER — EXTERNAL HOSPITAL ADMISSION (OUTPATIENT)
Dept: ADMINISTRATIVE | Facility: CLINIC | Age: 75
End: 2020-09-16

## 2020-09-16 NOTE — PATIENT INSTRUCTIONS
Urinary Tract Infections in Men    Urinary tract infections (UTIs) are most often caused by bacteria that invade the urinary tract. The bacteria may come from outside the body. Or they may travel from the skin outside of rectum into the urethra. Pain in or around the urinary tract is a common symptom for most UTIs. But the only way to know for sure if you have a UTI is to have a urinalysis and urine culture.   Types of UTIs  · Cystitis: This is a bladder infection and is often linked to a blockage from an enlarged prostate. You may have an urgent or frequent need to urinate, and bloody urine. Treatment includes antibiotics and medicine to relax or shrink the prostate. Sometimes, surgery is needed.  · Urethritis: This is an infection of the urethra. You may have a discharge from the urethra or burning when you urinate. You may also have pain in the urethra or penis. It is treated with antibiotics.  · Prostatitis: This is an inflammation or infection of the prostate. You may have an urgent or frequent need to urinate, fever, or burning when you urinate. Or you may have a tender prostate, or a vague feeling of pressure. Prostatitis is treated with a range of medicines, depending on the cause.  · Pyelonephritis: This is a kidney infection. If not treated, it can be serious and damage your kidneys. In severe cases you may be hospitalized. You may have a fever and upper back pain.  Treating a UTI  · Medicine: Most UTIs are treated with antibiotics. These kill the bacteria. The length of time you need to take them depends on the type of infection. Take antibiotics exactly as directed until all of the medicine is gone. If you don't, the infection may not go away and may become harder to treat. For certain types of UTIs, you may be given other medicine to help treat your symptoms.  · Lifestyle changes: The lifestyle changes below will help get rid of your current infection. They may also help prevent future UTIs.  ¨ Drink  plenty of fluids such as water, juice, or other caffeine-free drinks. This helps flush bacteria out of your system.  ¨ Empty your bladder when you feel the urge to urinate and before going to sleep. Urine that stays in your bladder promotes infection.  ¨ Use condoms during sex. These help prevent UTIs caused by sexually transmitted bacteria.  ¨ Keep follow-up appointments with your healthcare provider. He or she can may do tests to make sure the infection has cleared. If needed, more treatment can be started.  · Other treatment: Most UTIs respond to medicine. But sometimes a procedure or surgery is needed. This can treat an enlarged prostate, or remove a kidney stone or other blockage. Surgery may also treat problems caused by scarring or long-term infections.  Date Last Reviewed: 1/1/2017  © 5469-1363 [employer]. [address] [place] [address], [place] [address]. All rights reserved. This information is not intended as a substitute for professional medical care. Always follow your healthcare professional's instructions.

## 2020-09-22 ENCOUNTER — LAB VISIT (OUTPATIENT)
Dept: LAB | Facility: HOSPITAL | Age: 75
End: 2020-09-22
Attending: UROLOGY
Payer: MEDICARE

## 2020-09-22 DIAGNOSIS — Z48.816 SURGICAL AFTERCARE, GENITOURINARY SYSTEM: Primary | ICD-10-CM

## 2020-09-22 LAB
ANION GAP SERPL CALC-SCNC: 11 MMOL/L (ref 8–16)
BASOPHILS # BLD AUTO: 0.05 K/UL (ref 0–0.2)
BASOPHILS NFR BLD: 0.5 % (ref 0–1.9)
BUN SERPL-MCNC: 16 MG/DL (ref 8–23)
CALCIUM SERPL-MCNC: 8 MG/DL (ref 8.7–10.5)
CHLORIDE SERPL-SCNC: 108 MMOL/L (ref 95–110)
CO2 SERPL-SCNC: 20 MMOL/L (ref 23–29)
CREAT SERPL-MCNC: 1.5 MG/DL (ref 0.5–1.4)
DIFFERENTIAL METHOD: ABNORMAL
EOSINOPHIL # BLD AUTO: 0.6 K/UL (ref 0–0.5)
EOSINOPHIL NFR BLD: 5.7 % (ref 0–8)
ERYTHROCYTE [DISTWIDTH] IN BLOOD BY AUTOMATED COUNT: 16.4 % (ref 11.5–14.5)
EST. GFR  (AFRICAN AMERICAN): 52 ML/MIN/1.73 M^2
EST. GFR  (NON AFRICAN AMERICAN): 45 ML/MIN/1.73 M^2
GLUCOSE SERPL-MCNC: 136 MG/DL (ref 70–110)
HCT VFR BLD AUTO: 24.2 % (ref 40–54)
HGB BLD-MCNC: 7.8 G/DL (ref 14–18)
IMM GRANULOCYTES # BLD AUTO: 0.04 K/UL (ref 0–0.04)
IMM GRANULOCYTES NFR BLD AUTO: 0.4 % (ref 0–0.5)
LYMPHOCYTES # BLD AUTO: 1.2 K/UL (ref 1–4.8)
LYMPHOCYTES NFR BLD: 11.4 % (ref 18–48)
MCH RBC QN AUTO: 30.4 PG (ref 27–31)
MCHC RBC AUTO-ENTMCNC: 32.2 G/DL (ref 32–36)
MCV RBC AUTO: 94 FL (ref 82–98)
MONOCYTES # BLD AUTO: 0.7 K/UL (ref 0.3–1)
MONOCYTES NFR BLD: 6.7 % (ref 4–15)
NEUTROPHILS # BLD AUTO: 7.7 K/UL (ref 1.8–7.7)
NEUTROPHILS NFR BLD: 75.3 % (ref 38–73)
NRBC BLD-RTO: 0 /100 WBC
PLATELET # BLD AUTO: 509 K/UL (ref 150–350)
PMV BLD AUTO: 10 FL (ref 9.2–12.9)
POTASSIUM SERPL-SCNC: 3.8 MMOL/L (ref 3.5–5.1)
RBC # BLD AUTO: 2.57 M/UL (ref 4.6–6.2)
SODIUM SERPL-SCNC: 139 MMOL/L (ref 136–145)
WBC # BLD AUTO: 10.16 K/UL (ref 3.9–12.7)

## 2020-09-22 PROCEDURE — 80048 BASIC METABOLIC PNL TOTAL CA: CPT | Mod: HCNC

## 2020-09-22 PROCEDURE — 85025 COMPLETE CBC W/AUTO DIFF WBC: CPT | Mod: HCNC

## 2020-09-23 ENCOUNTER — TELEPHONE (OUTPATIENT)
Dept: FAMILY MEDICINE | Facility: CLINIC | Age: 75
End: 2020-09-23

## 2020-09-23 NOTE — TELEPHONE ENCOUNTER
Tell him he has some renal insufficiency and some anemia. This can be expected post op---lets repeat that in 4-5 days so we can trend it for a little while. Thanks   Dr. Mcallister

## 2020-09-23 NOTE — TELEPHONE ENCOUNTER
----- Message from Grace Blankenship sent at 2020  4:41 PM CDT -----  Contact: self  James Quan Jr.  MRN: [id number]  : 1945  PCP: Alex Mcallister  Home Phone      [phone]  Work Phone      Not on file.  Mobile          [phone]      MESSAGE: Pt would like to know if Dr. Mcallister can review results that were taking yesterday.   Phone: [phone]

## 2020-09-24 NOTE — TELEPHONE ENCOUNTER
Spoke to patient, informed of recommendations. [facility] will be drawing blood again next week. Patient will call back to have  review once collected.

## 2020-09-24 NOTE — TELEPHONE ENCOUNTER
----- Message from Fernanda Mckinnon sent at 2020 11:17 AM CDT -----  Regarding: Results  Contact: Self  James Quan Jr.  MRN: [id number]  : 1945  PCP: Alex Mcallister  Home Phone      [phone]  Work Phone      Not on file.  Mobile          [phone]      MESSAGE:   Patient is calling to get results of his bloodwork. States hes looking at it online and his blood count is low. Please see yesterdays message.    Phone: [phone]

## 2020-09-29 ENCOUNTER — OFFICE VISIT (OUTPATIENT)
Dept: FAMILY MEDICINE | Facility: CLINIC | Age: 75
End: 2020-09-29
Payer: MEDICARE

## 2020-09-29 ENCOUNTER — PATIENT MESSAGE (OUTPATIENT)
Dept: OTHER | Facility: OTHER | Age: 75
End: 2020-09-29

## 2020-09-29 ENCOUNTER — LAB VISIT (OUTPATIENT)
Dept: LAB | Facility: HOSPITAL | Age: 75
End: 2020-09-29
Attending: FAMILY MEDICINE
Payer: MEDICARE

## 2020-09-29 VITALS
TEMPERATURE: 97 F | BODY MASS INDEX: 24.34 KG/M2 | RESPIRATION RATE: 18 BRPM | HEIGHT: 66 IN | WEIGHT: 151.44 LBS | SYSTOLIC BLOOD PRESSURE: 90 MMHG | HEART RATE: 84 BPM | DIASTOLIC BLOOD PRESSURE: 60 MMHG

## 2020-09-29 DIAGNOSIS — Z48.816 AFTERCARE FOLLOWING SURGERY OF THE GENITOURINARY SYSTEM, NEC: Primary | ICD-10-CM

## 2020-09-29 DIAGNOSIS — I10 ESSENTIAL HYPERTENSION: ICD-10-CM

## 2020-09-29 DIAGNOSIS — R53.83 FATIGUE, UNSPECIFIED TYPE: ICD-10-CM

## 2020-09-29 DIAGNOSIS — Z09 HOSPITAL DISCHARGE FOLLOW-UP: Primary | ICD-10-CM

## 2020-09-29 DIAGNOSIS — D50.0 IRON DEFICIENCY ANEMIA DUE TO CHRONIC BLOOD LOSS: ICD-10-CM

## 2020-09-29 DIAGNOSIS — C92.10 CML (CHRONIC MYELOCYTIC LEUKEMIA): ICD-10-CM

## 2020-09-29 PROBLEM — N30.00 ACUTE CYSTITIS WITHOUT HEMATURIA: Status: RESOLVED | Noted: 2019-05-20 | Resolved: 2020-09-29

## 2020-09-29 PROBLEM — R27.0 ATAXIA: Status: RESOLVED | Noted: 2019-10-08 | Resolved: 2020-09-29

## 2020-09-29 PROBLEM — R10.2 ACUTE SUPRAPUBIC PAIN: Status: RESOLVED | Noted: 2019-10-02 | Resolved: 2020-09-29

## 2020-09-29 LAB
ALBUMIN SERPL BCP-MCNC: 3 G/DL (ref 3.5–5.2)
ALP SERPL-CCNC: 78 U/L (ref 55–135)
ALT SERPL W/O P-5'-P-CCNC: 19 U/L (ref 10–44)
ANION GAP SERPL CALC-SCNC: 11 MMOL/L (ref 8–16)
AST SERPL-CCNC: 21 U/L (ref 10–40)
BASOPHILS # BLD AUTO: 0.06 K/UL (ref 0–0.2)
BASOPHILS NFR BLD: 0.5 % (ref 0–1.9)
BILIRUB SERPL-MCNC: 0.3 MG/DL (ref 0.1–1)
BUN SERPL-MCNC: 18 MG/DL (ref 8–23)
CALCIUM SERPL-MCNC: 9 MG/DL (ref 8.7–10.5)
CHLORIDE SERPL-SCNC: 107 MMOL/L (ref 95–110)
CO2 SERPL-SCNC: 23 MMOL/L (ref 23–29)
CREAT SERPL-MCNC: 1.7 MG/DL (ref 0.5–1.4)
DIFFERENTIAL METHOD: ABNORMAL
EOSINOPHIL # BLD AUTO: 0.3 K/UL (ref 0–0.5)
EOSINOPHIL NFR BLD: 2.8 % (ref 0–8)
ERYTHROCYTE [DISTWIDTH] IN BLOOD BY AUTOMATED COUNT: 16.5 % (ref 11.5–14.5)
EST. GFR  (AFRICAN AMERICAN): 45 ML/MIN/1.73 M^2
EST. GFR  (NON AFRICAN AMERICAN): 39 ML/MIN/1.73 M^2
GLUCOSE SERPL-MCNC: 128 MG/DL (ref 70–110)
HCT VFR BLD AUTO: 28.2 % (ref 40–54)
HGB BLD-MCNC: 8.9 G/DL (ref 14–18)
IMM GRANULOCYTES # BLD AUTO: 0.04 K/UL (ref 0–0.04)
IMM GRANULOCYTES NFR BLD AUTO: 0.4 % (ref 0–0.5)
LYMPHOCYTES # BLD AUTO: 1.2 K/UL (ref 1–4.8)
LYMPHOCYTES NFR BLD: 10.8 % (ref 18–48)
MCH RBC QN AUTO: 30.2 PG (ref 27–31)
MCHC RBC AUTO-ENTMCNC: 31.6 G/DL (ref 32–36)
MCV RBC AUTO: 96 FL (ref 82–98)
MONOCYTES # BLD AUTO: 0.7 K/UL (ref 0.3–1)
MONOCYTES NFR BLD: 6.4 % (ref 4–15)
NEUTROPHILS # BLD AUTO: 8.8 K/UL (ref 1.8–7.7)
NEUTROPHILS NFR BLD: 79.1 % (ref 38–73)
NRBC BLD-RTO: 0 /100 WBC
PLATELET # BLD AUTO: 530 K/UL (ref 150–350)
PMV BLD AUTO: 9.1 FL (ref 9.2–12.9)
POTASSIUM SERPL-SCNC: 4.2 MMOL/L (ref 3.5–5.1)
PROT SERPL-MCNC: 7.1 G/DL (ref 6–8.4)
RBC # BLD AUTO: 2.95 M/UL (ref 4.6–6.2)
SODIUM SERPL-SCNC: 141 MMOL/L (ref 136–145)
WBC # BLD AUTO: 11.18 K/UL (ref 3.9–12.7)

## 2020-09-29 PROCEDURE — 3078F DIAST BP <80 MM HG: CPT | Mod: HCNC,CPTII,S$GLB, | Performed by: FAMILY MEDICINE

## 2020-09-29 PROCEDURE — 99499 UNLISTED E&M SERVICE: CPT | Mod: HCNC,,, | Performed by: FAMILY MEDICINE

## 2020-09-29 PROCEDURE — 99499 RISK ADDL DX/OHS AUDIT: ICD-10-PCS | Mod: HCNC,S$GLB,, | Performed by: FAMILY MEDICINE

## 2020-09-29 PROCEDURE — 1159F MED LIST DOCD IN RCRD: CPT | Mod: HCNC,S$GLB,, | Performed by: FAMILY MEDICINE

## 2020-09-29 PROCEDURE — 3074F PR MOST RECENT SYSTOLIC BLOOD PRESSURE < 130 MM HG: ICD-10-PCS | Mod: HCNC,CPTII,S$GLB, | Performed by: FAMILY MEDICINE

## 2020-09-29 PROCEDURE — 36415 COLL VENOUS BLD VENIPUNCTURE: CPT | Mod: HCNC

## 2020-09-29 PROCEDURE — 3074F SYST BP LT 130 MM HG: CPT | Mod: HCNC,CPTII,S$GLB, | Performed by: FAMILY MEDICINE

## 2020-09-29 PROCEDURE — 1159F PR MEDICATION LIST DOCUMENTED IN MEDICAL RECORD: ICD-10-PCS | Mod: HCNC,S$GLB,, | Performed by: FAMILY MEDICINE

## 2020-09-29 PROCEDURE — 1125F PR PAIN SEVERITY QUANTIFIED, PAIN PRESENT: ICD-10-PCS | Mod: HCNC,S$GLB,, | Performed by: FAMILY MEDICINE

## 2020-09-29 PROCEDURE — 90694 FLU VACCINE - QUADRIVALENT - ADJUVANTED: ICD-10-PCS | Mod: HCNC,S$GLB,, | Performed by: FAMILY MEDICINE

## 2020-09-29 PROCEDURE — 99214 OFFICE O/P EST MOD 30 MIN: CPT | Mod: 25,HCNC,S$GLB, | Performed by: FAMILY MEDICINE

## 2020-09-29 PROCEDURE — G0008 FLU VACCINE - QUADRIVALENT - ADJUVANTED: ICD-10-PCS | Mod: HCNC,S$GLB,, | Performed by: FAMILY MEDICINE

## 2020-09-29 PROCEDURE — 99499 UNLISTED E&M SERVICE: CPT | Mod: HCNC,S$GLB,, | Performed by: FAMILY MEDICINE

## 2020-09-29 PROCEDURE — 80053 COMPREHEN METABOLIC PANEL: CPT | Mod: HCNC

## 2020-09-29 PROCEDURE — 99214 PR OFFICE/OUTPT VISIT, EST, LEVL IV, 30-39 MIN: ICD-10-PCS | Mod: 25,HCNC,S$GLB, | Performed by: FAMILY MEDICINE

## 2020-09-29 PROCEDURE — 3008F PR BODY MASS INDEX (BMI) DOCUMENTED: ICD-10-PCS | Mod: HCNC,CPTII,S$GLB, | Performed by: FAMILY MEDICINE

## 2020-09-29 PROCEDURE — 85025 COMPLETE CBC W/AUTO DIFF WBC: CPT | Mod: HCNC

## 2020-09-29 PROCEDURE — G0008 ADMIN INFLUENZA VIRUS VAC: HCPCS | Mod: HCNC,S$GLB,, | Performed by: FAMILY MEDICINE

## 2020-09-29 PROCEDURE — 99999 PR PBB SHADOW E&M-EST. PATIENT-LVL IV: ICD-10-PCS | Mod: PBBFAC,HCNC,, | Performed by: FAMILY MEDICINE

## 2020-09-29 PROCEDURE — 1101F PR PT FALLS ASSESS DOC 0-1 FALLS W/OUT INJ PAST YR: ICD-10-PCS | Mod: HCNC,CPTII,S$GLB, | Performed by: FAMILY MEDICINE

## 2020-09-29 PROCEDURE — 1125F AMNT PAIN NOTED PAIN PRSNT: CPT | Mod: HCNC,S$GLB,, | Performed by: FAMILY MEDICINE

## 2020-09-29 PROCEDURE — 1101F PT FALLS ASSESS-DOCD LE1/YR: CPT | Mod: HCNC,CPTII,S$GLB, | Performed by: FAMILY MEDICINE

## 2020-09-29 PROCEDURE — 99999 PR PBB SHADOW E&M-EST. PATIENT-LVL IV: CPT | Mod: PBBFAC,HCNC,, | Performed by: FAMILY MEDICINE

## 2020-09-29 PROCEDURE — 3078F PR MOST RECENT DIASTOLIC BLOOD PRESSURE < 80 MM HG: ICD-10-PCS | Mod: HCNC,CPTII,S$GLB, | Performed by: FAMILY MEDICINE

## 2020-09-29 PROCEDURE — 90694 VACC AIIV4 NO PRSRV 0.5ML IM: CPT | Mod: HCNC,S$GLB,, | Performed by: FAMILY MEDICINE

## 2020-09-29 PROCEDURE — 3008F BODY MASS INDEX DOCD: CPT | Mod: HCNC,CPTII,S$GLB, | Performed by: FAMILY MEDICINE

## 2020-09-29 PROCEDURE — 99499 RISK ADDL DX/OHS AUDIT: ICD-10-PCS | Mod: HCNC,,, | Performed by: FAMILY MEDICINE

## 2020-09-29 RX ORDER — APIXABAN 2.5 MG/1
2.5 TABLET, FILM COATED ORAL 2 TIMES DAILY
COMMUNITY
Start: 2020-09-15 | End: 2020-11-27 | Stop reason: ALTCHOICE

## 2020-09-29 NOTE — PROGRESS NOTES
Subjective:       Patient ID: James Quan Jr. is a 75 y.o. male.    Chief Complaint: Follow-up (had surgery 9/11)    Pt is a 75 y.o. male who presents for evaluation and management of   Encounter Diagnoses   Name Primary?    Hospital discharge follow-up Yes    CML (chronic myelocytic leukemia)     Essential hypertension     Iron deficiency anemia due to chronic blood loss    .Surgery on the 11th, ileal conduit.  On eliquis for 1 month post op per pt. Rx by Dr. Montanez.  Pt c/o some blood clots in his urine. Pelvic pain is gone except when he has a BM   Has HH. Dr. Montanez ordered CBC for today.  Sees Dr. Montanez on the 21st.     Doing well on current meds. Denies any side effects. Prevention is up to date.    Review of Systems   Constitutional: Positive for fatigue.   Respiratory: Positive for shortness of breath.    Gastrointestinal: Negative for blood in stool.   Genitourinary: Positive for hematuria.        Blood in urine        Objective:      Physical Exam  Constitutional:       Appearance: He is well-developed.   HENT:      Head: Normocephalic and atraumatic.      Right Ear: External ear normal.      Left Ear: External ear normal.      Nose: Nose normal.   Eyes:      General: No scleral icterus.        Right eye: No discharge.         Left eye: No discharge.      Conjunctiva/sclera: Conjunctivae normal.      Pupils: Pupils are equal, round, and reactive to light.   Neck:      Musculoskeletal: Normal range of motion and neck supple.      Thyroid: No thyromegaly.      Vascular: No JVD.      Trachea: No tracheal deviation.   Cardiovascular:      Rate and Rhythm: Normal rate and regular rhythm.      Heart sounds: Normal heart sounds. No murmur.   Pulmonary:      Effort: Pulmonary effort is normal. No respiratory distress.      Breath sounds: Normal breath sounds. No wheezing or rales.   Chest:      Chest wall: No tenderness.   Abdominal:      General: Bowel sounds are normal. There is no  distension.      Palpations: Abdomen is soft. There is no mass.      Tenderness: There is no abdominal tenderness. There is no guarding or rebound.   Genitourinary:     Comments: Ileal conduit bag RLQ. Bloody urine   Musculoskeletal: Normal range of motion.   Lymphadenopathy:      Cervical: No cervical adenopathy.   Skin:     General: Skin is warm and dry.   Neurological:      Mental Status: He is alert and oriented to person, place, and time.      Cranial Nerves: No cranial nerve deficit.      Motor: No abnormal muscle tone.      Coordination: Coordination normal.      Deep Tendon Reflexes: Reflexes are normal and symmetric. Reflexes normal.   Psychiatric:         Behavior: Behavior normal.         Thought Content: Thought content normal.         Judgment: Judgment normal.         Assessment:       1. Hospital discharge follow-up    2. CML (chronic myelocytic leukemia)    3. Essential hypertension    4. Iron deficiency anemia due to chronic blood loss        Plan:   James was seen today for follow-up.    Diagnoses and all orders for this visit:    Hospital discharge follow-up    CML (chronic myelocytic leukemia)    Essential hypertension    Iron deficiency anemia due to chronic blood loss      Problem List Items Addressed This Visit     CML (chronic myelocytic leukemia)    Overview     Sees dr beaulieu   On gleevac         Essential hypertension    Overview     No meds for now as he has been hypotensive since ileal conduit surgery          Iron deficiency anemia due to chronic blood loss    Overview     Continue Fe daily   F/u CBC today. May need transfusion            Other Visit Diagnoses     Hospital discharge follow-up    -  Primary        Follow with home health   May need transfusion       No follow-ups on file.

## 2020-09-30 NOTE — PROGRESS NOTES
Labs look good. His blood count actually went up. Kidney function is down a tad---drink more water.  I think he already knows no NSAIds. Tylenol only if he has pain

## 2020-09-30 NOTE — PROGRESS NOTES
Patient, James Quan Jr. (MRN #[id number]), presented with a recent Estimated Glumerular Filtration Rate (EGFR) between 30 and 45 consistent with the definition of chronic kidney disease stage 3 - moderate (ICD10 - N18.3).    eGFR if non    Date Value Ref Range Status   09/29/2020 39 (A) >60 mL/min/1.73 m^2 Final     Comment:     Calculation used to obtain the estimated glomerular filtration  rate (eGFR) is the CKD-EPI equation.          The patient's chronic kidney disease stage 3 was monitored, evaluated, addressed and/or treated. This addendum to the medical record is made on 09/30/2020.

## 2020-10-05 ENCOUNTER — PATIENT MESSAGE (OUTPATIENT)
Dept: FAMILY MEDICINE | Facility: CLINIC | Age: 75
End: 2020-10-05

## 2020-10-05 DIAGNOSIS — R39.82 CHRONIC BLADDER PAIN: ICD-10-CM

## 2020-10-05 RX ORDER — TRAMADOL HYDROCHLORIDE 50 MG/1
50 TABLET ORAL EVERY 6 HOURS PRN
Qty: 60 TABLET | Refills: 3 | Status: SHIPPED | OUTPATIENT
Start: 2020-10-05

## 2020-10-05 NOTE — TELEPHONE ENCOUNTER
Im having painful rectum spasms and Im nearly out of the tramadol Rx you prescribed a couple months ago..Im asking you to consider giving me a refill please..  thank you

## 2020-10-06 ENCOUNTER — TELEPHONE (OUTPATIENT)
Dept: FAMILY MEDICINE | Facility: CLINIC | Age: 75
End: 2020-10-06

## 2020-10-06 ENCOUNTER — LAB VISIT (OUTPATIENT)
Dept: LAB | Facility: HOSPITAL | Age: 75
End: 2020-10-06
Attending: UROLOGY
Payer: MEDICARE

## 2020-10-06 DIAGNOSIS — Z48.816 AFTERCARE FOLLOWING SURGERY OF THE GENITOURINARY SYSTEM, NEC: Primary | ICD-10-CM

## 2020-10-06 LAB
ANION GAP SERPL CALC-SCNC: 10 MMOL/L (ref 8–16)
BASOPHILS # BLD AUTO: 0.04 K/UL (ref 0–0.2)
BASOPHILS NFR BLD: 0.3 % (ref 0–1.9)
BUN SERPL-MCNC: 14 MG/DL (ref 8–23)
CALCIUM SERPL-MCNC: 8.3 MG/DL (ref 8.7–10.5)
CHLORIDE SERPL-SCNC: 106 MMOL/L (ref 95–110)
CO2 SERPL-SCNC: 22 MMOL/L (ref 23–29)
CREAT SERPL-MCNC: 1.4 MG/DL (ref 0.5–1.4)
DIFFERENTIAL METHOD: ABNORMAL
EOSINOPHIL # BLD AUTO: 0.1 K/UL (ref 0–0.5)
EOSINOPHIL NFR BLD: 0.6 % (ref 0–8)
ERYTHROCYTE [DISTWIDTH] IN BLOOD BY AUTOMATED COUNT: 16.5 % (ref 11.5–14.5)
EST. GFR  (AFRICAN AMERICAN): 57 ML/MIN/1.73 M^2
EST. GFR  (NON AFRICAN AMERICAN): 49 ML/MIN/1.73 M^2
GLUCOSE SERPL-MCNC: 125 MG/DL (ref 70–110)
HCT VFR BLD AUTO: 19.9 % (ref 40–54)
HGB BLD-MCNC: 6.3 G/DL (ref 14–18)
IMM GRANULOCYTES # BLD AUTO: 0.06 K/UL (ref 0–0.04)
IMM GRANULOCYTES NFR BLD AUTO: 0.4 % (ref 0–0.5)
LYMPHOCYTES # BLD AUTO: 0.9 K/UL (ref 1–4.8)
LYMPHOCYTES NFR BLD: 5.9 % (ref 18–48)
MCH RBC QN AUTO: 30 PG (ref 27–31)
MCHC RBC AUTO-ENTMCNC: 31.7 G/DL (ref 32–36)
MCV RBC AUTO: 95 FL (ref 82–98)
MONOCYTES # BLD AUTO: 1 K/UL (ref 0.3–1)
MONOCYTES NFR BLD: 7.1 % (ref 4–15)
NEUTROPHILS # BLD AUTO: 12.3 K/UL (ref 1.8–7.7)
NEUTROPHILS NFR BLD: 85.7 % (ref 38–73)
NRBC BLD-RTO: 0 /100 WBC
PLATELET # BLD AUTO: 316 K/UL (ref 150–350)
PMV BLD AUTO: 10.7 FL (ref 9.2–12.9)
POTASSIUM SERPL-SCNC: 3.2 MMOL/L (ref 3.5–5.1)
RBC # BLD AUTO: 2.1 M/UL (ref 4.6–6.2)
SODIUM SERPL-SCNC: 138 MMOL/L (ref 136–145)
WBC # BLD AUTO: 14.3 K/UL (ref 3.9–12.7)

## 2020-10-06 PROCEDURE — 85025 COMPLETE CBC W/AUTO DIFF WBC: CPT | Mod: HCNC

## 2020-10-06 PROCEDURE — 80048 BASIC METABOLIC PNL TOTAL CA: CPT | Mod: HCNC

## 2020-10-10 PROCEDURE — G0179 PR HOME HEALTH MD RECERTIFICATION: ICD-10-PCS | Mod: ,,, | Performed by: FAMILY MEDICINE

## 2020-10-10 PROCEDURE — G0179 MD RECERTIFICATION HHA PT: HCPCS | Mod: ,,, | Performed by: FAMILY MEDICINE

## 2020-10-13 ENCOUNTER — LAB VISIT (OUTPATIENT)
Dept: LAB | Facility: HOSPITAL | Age: 75
End: 2020-10-13
Attending: UROLOGY
Payer: MEDICARE

## 2020-10-13 DIAGNOSIS — Z48.816 ENCOUNTER FOR SURGICAL AFTCR FOLLOWING SURGERY ON THE GU SYS: Primary | ICD-10-CM

## 2020-10-13 LAB
ANION GAP SERPL CALC-SCNC: 11 MMOL/L (ref 8–16)
BASOPHILS # BLD AUTO: 0.04 K/UL (ref 0–0.2)
BASOPHILS NFR BLD: 0.3 % (ref 0–1.9)
BUN SERPL-MCNC: 14 MG/DL (ref 8–23)
CALCIUM SERPL-MCNC: 8.5 MG/DL (ref 8.7–10.5)
CHLORIDE SERPL-SCNC: 103 MMOL/L (ref 95–110)
CO2 SERPL-SCNC: 25 MMOL/L (ref 23–29)
CREAT SERPL-MCNC: 1.4 MG/DL (ref 0.5–1.4)
DIFFERENTIAL METHOD: ABNORMAL
EOSINOPHIL # BLD AUTO: 0.4 K/UL (ref 0–0.5)
EOSINOPHIL NFR BLD: 3.3 % (ref 0–8)
ERYTHROCYTE [DISTWIDTH] IN BLOOD BY AUTOMATED COUNT: 15.3 % (ref 11.5–14.5)
EST. GFR  (AFRICAN AMERICAN): 57 ML/MIN/1.73 M^2
EST. GFR  (NON AFRICAN AMERICAN): 49 ML/MIN/1.73 M^2
GLUCOSE SERPL-MCNC: 142 MG/DL (ref 70–110)
HCT VFR BLD AUTO: 32.4 % (ref 40–54)
HGB BLD-MCNC: 10.2 G/DL (ref 14–18)
IMM GRANULOCYTES # BLD AUTO: 0.05 K/UL (ref 0–0.04)
IMM GRANULOCYTES NFR BLD AUTO: 0.4 % (ref 0–0.5)
LYMPHOCYTES # BLD AUTO: 1.1 K/UL (ref 1–4.8)
LYMPHOCYTES NFR BLD: 9 % (ref 18–48)
MCH RBC QN AUTO: 29.4 PG (ref 27–31)
MCHC RBC AUTO-ENTMCNC: 31.5 G/DL (ref 32–36)
MCV RBC AUTO: 93 FL (ref 82–98)
MONOCYTES # BLD AUTO: 0.7 K/UL (ref 0.3–1)
MONOCYTES NFR BLD: 5.4 % (ref 4–15)
NEUTROPHILS # BLD AUTO: 10 K/UL (ref 1.8–7.7)
NEUTROPHILS NFR BLD: 81.6 % (ref 38–73)
NRBC BLD-RTO: 0 /100 WBC
PLATELET # BLD AUTO: 433 K/UL (ref 150–350)
PMV BLD AUTO: 10.1 FL (ref 9.2–12.9)
POTASSIUM SERPL-SCNC: 3.5 MMOL/L (ref 3.5–5.1)
RBC # BLD AUTO: 3.47 M/UL (ref 4.6–6.2)
SODIUM SERPL-SCNC: 139 MMOL/L (ref 136–145)
WBC # BLD AUTO: 12.3 K/UL (ref 3.9–12.7)

## 2020-10-13 PROCEDURE — 85025 COMPLETE CBC W/AUTO DIFF WBC: CPT | Mod: HCNC

## 2020-10-13 PROCEDURE — 80048 BASIC METABOLIC PNL TOTAL CA: CPT | Mod: HCNC

## 2020-10-14 ENCOUNTER — DOCUMENT SCAN (OUTPATIENT)
Dept: HOME HEALTH SERVICES | Facility: HOSPITAL | Age: 75
End: 2020-10-14
Payer: MEDICARE

## 2020-10-19 ENCOUNTER — EXTERNAL HOME HEALTH (OUTPATIENT)
Dept: HOME HEALTH SERVICES | Facility: HOSPITAL | Age: 75
End: 2020-10-19
Payer: MEDICARE

## 2020-10-20 ENCOUNTER — LAB VISIT (OUTPATIENT)
Dept: LAB | Facility: HOSPITAL | Age: 75
End: 2020-10-20
Attending: UROLOGY
Payer: MEDICARE

## 2020-10-20 DIAGNOSIS — Z48.816 ENCOUNTER FOR SURGICAL AFTCR FOLLOWING SURGERY ON THE GU SYS: Primary | ICD-10-CM

## 2020-10-20 LAB
ANION GAP SERPL CALC-SCNC: 9 MMOL/L (ref 8–16)
BASOPHILS # BLD AUTO: 0.07 K/UL (ref 0–0.2)
BASOPHILS NFR BLD: 0.7 % (ref 0–1.9)
BUN SERPL-MCNC: 15 MG/DL (ref 8–23)
CALCIUM SERPL-MCNC: 8.9 MG/DL (ref 8.7–10.5)
CHLORIDE SERPL-SCNC: 107 MMOL/L (ref 95–110)
CO2 SERPL-SCNC: 24 MMOL/L (ref 23–29)
CREAT SERPL-MCNC: 1.3 MG/DL (ref 0.5–1.4)
DIFFERENTIAL METHOD: ABNORMAL
EOSINOPHIL # BLD AUTO: 0.3 K/UL (ref 0–0.5)
EOSINOPHIL NFR BLD: 2.6 % (ref 0–8)
ERYTHROCYTE [DISTWIDTH] IN BLOOD BY AUTOMATED COUNT: 15.5 % (ref 11.5–14.5)
EST. GFR  (AFRICAN AMERICAN): >60 ML/MIN/1.73 M^2
EST. GFR  (NON AFRICAN AMERICAN): 54 ML/MIN/1.73 M^2
GLUCOSE SERPL-MCNC: 100 MG/DL (ref 70–110)
HCT VFR BLD AUTO: 33.3 % (ref 40–54)
HGB BLD-MCNC: 10.6 G/DL (ref 14–18)
IMM GRANULOCYTES # BLD AUTO: 0.02 K/UL (ref 0–0.04)
IMM GRANULOCYTES NFR BLD AUTO: 0.2 % (ref 0–0.5)
LYMPHOCYTES # BLD AUTO: 1.4 K/UL (ref 1–4.8)
LYMPHOCYTES NFR BLD: 13.3 % (ref 18–48)
MCH RBC QN AUTO: 29.9 PG (ref 27–31)
MCHC RBC AUTO-ENTMCNC: 31.8 G/DL (ref 32–36)
MCV RBC AUTO: 94 FL (ref 82–98)
MONOCYTES # BLD AUTO: 0.8 K/UL (ref 0.3–1)
MONOCYTES NFR BLD: 7.6 % (ref 4–15)
NEUTROPHILS # BLD AUTO: 7.7 K/UL (ref 1.8–7.7)
NEUTROPHILS NFR BLD: 75.6 % (ref 38–73)
NRBC BLD-RTO: 0 /100 WBC
PLATELET # BLD AUTO: 455 K/UL (ref 150–350)
PMV BLD AUTO: 9.7 FL (ref 9.2–12.9)
POTASSIUM SERPL-SCNC: 4.1 MMOL/L (ref 3.5–5.1)
RBC # BLD AUTO: 3.55 M/UL (ref 4.6–6.2)
SODIUM SERPL-SCNC: 140 MMOL/L (ref 136–145)
WBC # BLD AUTO: 10.19 K/UL (ref 3.9–12.7)

## 2020-10-20 PROCEDURE — 80048 BASIC METABOLIC PNL TOTAL CA: CPT | Mod: HCNC

## 2020-10-20 PROCEDURE — 85025 COMPLETE CBC W/AUTO DIFF WBC: CPT | Mod: HCNC

## 2020-11-05 ENCOUNTER — PATIENT OUTREACH (OUTPATIENT)
Dept: ADMINISTRATIVE | Facility: CLINIC | Age: 75
End: 2020-11-05

## 2020-11-05 ENCOUNTER — PATIENT MESSAGE (OUTPATIENT)
Dept: FAMILY MEDICINE | Facility: CLINIC | Age: 75
End: 2020-11-05

## 2020-11-05 RX ORDER — HYDROCODONE BITARTRATE AND ACETAMINOPHEN 7.5; 325 MG/1; MG/1
1 TABLET ORAL EVERY 8 HOURS PRN
Qty: 21 TABLET | Refills: 0 | Status: SHIPPED | OUTPATIENT
Start: 2020-11-05 | End: 2020-11-27 | Stop reason: ALTCHOICE

## 2020-11-05 NOTE — PATIENT INSTRUCTIONS
Wound Check After Surgery, No Complication  Surgery involves cutting through layers of skin, fatty tissue, muscle, and sometimes bone and cartilage. Sutures (stitches) or staples are used to close all layers of the wound. The sutures on the inside will dissolve in about 2 to 3 weeks. Any sutures or staples used on the outside need to be removed in about 7 to 14 days, depending on the location.  It is normal to have some clear or bloody discharge on the wound covering or bandage (dressing) for the first few days after surgery. If your wound was sutured (sewn) closed, you should not have to change the dressing more than twice a day in the first few days. Bleeding or discharge requiring more frequent dressing changes can be a sign of a problem.  It is normal to feel pain at the incision site. The pain decreases as the wound heals. Most of the pain and soreness from the skin incision should go away by the time the sutures or staples are removed. Soreness and pain from deeper tissues may last another week or two.  Pain that continues more than a few weeks after surgery or pain that worsens anytime after surgery can be a sign of a problem, such as:  · Infection  · Separation of wound edges  · Collection of blood or other below the skin  Home care  Different types of surgery require different types of care and dressing changes. It is important to follow all instructions and advice from your surgeon, as well as other members of your healthcare team.  Wound care  · Keep the wound clean, as directed by your healthcare provider.  · Change the dressing as directed. Change the dressing sooner if it becomes wet or stained with blood or fluid from the wound.  · Bathe with a sponge (no shower or tub baths) for the first few days after surgery, or until there is no more drainage from the wound. Unless you received different instructions from your surgeon, you can then shower. Do not soak the area in water (no baths or swimming)  until the tape, sutures, or staples are removed and any wound opening has dried out and healed.  Changing the dressing  · Wash your hands before changing the dressings.  · Carefully remove the dressing and tape; dont just yank it off. If it sticks to the wound, you may need to wet it a little to remove it, unless your healthcare provider told you not to wet it.  · Wash your hands again before putting on a new, clean dressing.  · Gently clean the wound with clean water (or saline) using gauze or a clean washcloth. Do not rub it or pick at it.  · Do not use soap, alcohol, hydrogen peroxide, or any other cleanser.  · If you were told to dry the wound before putting on a new dressing, gently pat it dry. Do not rub.  · Throw out the old dressing. Do not reuse it!  · Wash your hands again when you are done.  Types of dressings  Your healthcare team will tell you what type of dressing to put on your wound. Follow your healthcare teams instructions carefully, and contact them if you have any questions. Two common types of dressings are described below. You may have one of these or another type.  · Dry dressing. Use dry gauze. If the wound is still draining, use a nonadherent dressing, which shouldnt stick to the wound.  · Wet-to-dry dressing. Wet the gauze, and squeeze out the excess water (or saline), before putting it on. Then, cover this with a dry pad.  Medicines  · If you were given antibiotics, take them until they are used up or your healthcare provider tells you to stop. It is important to finish the antibiotics even though you feel better, to make sure the infection has cleared.  · You can take acetaminophen or ibuprofen for pain, unless you were given a different pain medicine to use. (Note: If you have chronic liver or kidney disease, or have ever had a stomach ulcer or gastrointestinal bleeding, or are taking blood thinner medicines, talk with your healthcare provider before using these  medicines.)  · Aspirin should never be used in anyone under 18 years of age who is ill with a fever. It may cause severe liver damage.  Follow-up care  Follow up with your healthcare provider, or as advised, for your next wound check or removal of your sutures, staples, or tape.  · If a culture was done, you will be notified if the results will affect your treatment. You can call as directed for the results.  · If imaging tests, such as X-rays, an ultrasound, or CT scan were done, they will be reviewed by a specialist. You will be notified of the results, especially if they affect treatment.  Call 911  Call emergency services right away if any of these occur:  · Trouble breathing or swallowing, wheezing  · Hoarse voice or trouble speaking  · Extreme confusion  · Extreme drowsiness or trouble awakening  · Fainting or loss of consciousness  · Rapid heart rate or very slow heart rate  · Vomiting blood, or large amounts of blood in stool  · Discomfort in the center of the chest that feels like pressure, squeezing, a sense of fullness, or pain.  · Discomfort or pain in other upper body areas, such as the back, one or both arms, neck, jaw, or stomach  · Stroke symptoms (spot a stroke FAST)  ¨ F: Face drooping. One side of the face is numb or droops.  ¨ A: Arm weakness. One arm feels weak or numb.  ¨ S: Speech difficulty: Speech is slurred, or the person is unable to speak.  ¨ T: Time to call 911. Even if symptoms go away, call 911.  When to seek medical advice  Call your healthcare provider right away if any of the following occur:  · Increasing pain at the site of surgery  · Fever over 100.4º F (38º C)  · Redness around the wound  · Fluid, pus, or blood draining from the wound  · Vomiting, constipation, or diarrhea  Date Last Reviewed: 9/27/2015  © 4530-9185 [employer]. [address] [place] [address], [place] [address]. All rights reserved. This information is not intended as a substitute for professional  medical care. Always follow your healthcare professional's instructions.

## 2020-11-06 ENCOUNTER — TELEPHONE (OUTPATIENT)
Dept: FAMILY MEDICINE | Facility: CLINIC | Age: 75
End: 2020-11-06

## 2020-11-06 ENCOUNTER — PATIENT MESSAGE (OUTPATIENT)
Dept: FAMILY MEDICINE | Facility: CLINIC | Age: 75
End: 2020-11-06

## 2020-11-06 NOTE — TELEPHONE ENCOUNTER
----- Message from Mau Seo sent at 2020 11:40 AM CST -----  Contact: Rock @ [facility] Ben Quan Jr.  MRN: [id number]  : 1945  PCP: Alex Mcallister  Home Phone      [phone]  Work Phone      Not on file.  Mobile          [phone]      MESSAGE:  [facility] - received Rx for Norco - needs diagnosis code    Call [phone]    PCP: Ary

## 2020-11-10 ENCOUNTER — TELEPHONE (OUTPATIENT)
Dept: FAMILY MEDICINE | Facility: CLINIC | Age: 75
End: 2020-11-10

## 2020-11-10 NOTE — TELEPHONE ENCOUNTER
----- Message from Mau Seo sent at 11/10/2020  3:07 PM CST -----  Contact: Daughter - Emily Quan Jr.  MRN: [id number]  : 1945  PCP: Alex Mcallister  Home Phone      [phone]  Work Phone      Not on file.  Mobile          [phone]      MESSAGE:  patient getting a bill from [facility] [place] treatments -- referral from Dr Campbell getting kicked out, needs to come from PCP -- send referral for Bariatric treatments (CPT code 30403)  @ [facility] from 20 thru 6/15/20 to [employer] -- Fax # [phone]    Any questions MARCE Diaz @ [facility]    PCP: Ary

## 2020-11-11 NOTE — TELEPHONE ENCOUNTER
So hyperbarics dont come up in Epic orders, unless I am doing something wrong. If someone gets me a paper Rx I can write a prescription with the CPT code...  Thanks

## 2020-11-11 NOTE — TELEPHONE ENCOUNTER
Spoke w/ daughter Emily, states the patient has been going through hyperbaric oxygen treatments for the wounds from his suprapubic catheter placement. His insurance is not paying for the treatments as ordered by the urologist, but will if we order them    Patient did hyperbaric treatment from 5/19 through 6/15/20 with Dr. Zain Campbell. She states we will have to order them/refer them and include the [place] of 650[address].    Is this something you are familiar with?

## 2020-11-13 DIAGNOSIS — E03.9 HYPOTHYROIDISM, UNSPECIFIED TYPE: ICD-10-CM

## 2020-11-13 RX ORDER — LEVOTHYROXINE SODIUM 100 UG/1
TABLET ORAL
Qty: 30 TABLET | Refills: 8 | Status: SHIPPED | OUTPATIENT
Start: 2020-11-13

## 2020-11-16 NOTE — TELEPHONE ENCOUNTER
Spoke to Rachelle with Dr. Zain Campbell office, states patient was referred from a specialist for wound care but usure of ordering specialist.     States they are in need of a referral for wound care starting 5/19/2020. Last treatment 6/15/2020.    Left message for patient to call to discuss.    Dr. Campbell  [phone]

## 2020-11-27 ENCOUNTER — OFFICE VISIT (OUTPATIENT)
Dept: FAMILY MEDICINE | Facility: CLINIC | Age: 75
End: 2020-11-27
Payer: MEDICARE

## 2020-11-27 ENCOUNTER — TELEPHONE (OUTPATIENT)
Dept: FAMILY MEDICINE | Facility: CLINIC | Age: 75
End: 2020-11-27

## 2020-11-27 VITALS
DIASTOLIC BLOOD PRESSURE: 64 MMHG | RESPIRATION RATE: 20 BRPM | HEIGHT: 66 IN | BODY MASS INDEX: 22.5 KG/M2 | TEMPERATURE: 98 F | WEIGHT: 140 LBS | HEART RATE: 84 BPM | SYSTOLIC BLOOD PRESSURE: 108 MMHG

## 2020-11-27 DIAGNOSIS — R31.9 URINARY TRACT INFECTION WITH HEMATURIA, SITE UNSPECIFIED: ICD-10-CM

## 2020-11-27 DIAGNOSIS — N39.0 URINARY TRACT INFECTION WITH HEMATURIA, SITE UNSPECIFIED: ICD-10-CM

## 2020-11-27 DIAGNOSIS — R82.90 CLOUDY URINE: Primary | ICD-10-CM

## 2020-11-27 PROCEDURE — 3008F PR BODY MASS INDEX (BMI) DOCUMENTED: ICD-10-PCS | Mod: HCNC,CPTII,S$GLB, | Performed by: FAMILY MEDICINE

## 2020-11-27 PROCEDURE — 3078F DIAST BP <80 MM HG: CPT | Mod: HCNC,CPTII,S$GLB, | Performed by: FAMILY MEDICINE

## 2020-11-27 PROCEDURE — 3288F FALL RISK ASSESSMENT DOCD: CPT | Mod: HCNC,CPTII,S$GLB, | Performed by: FAMILY MEDICINE

## 2020-11-27 PROCEDURE — 1159F PR MEDICATION LIST DOCUMENTED IN MEDICAL RECORD: ICD-10-PCS | Mod: HCNC,S$GLB,, | Performed by: FAMILY MEDICINE

## 2020-11-27 PROCEDURE — 99499 UNLISTED E&M SERVICE: CPT | Mod: S$GLB,,, | Performed by: FAMILY MEDICINE

## 2020-11-27 PROCEDURE — 3288F PR FALLS RISK ASSESSMENT DOCUMENTED: ICD-10-PCS | Mod: HCNC,CPTII,S$GLB, | Performed by: FAMILY MEDICINE

## 2020-11-27 PROCEDURE — 81001 POCT URINALYSIS, DIPSTICK OR TABLET REAGENT, AUTOMATED, WITH MICROSCOP: ICD-10-PCS | Mod: HCNC,S$GLB,, | Performed by: FAMILY MEDICINE

## 2020-11-27 PROCEDURE — 1159F MED LIST DOCD IN RCRD: CPT | Mod: HCNC,S$GLB,, | Performed by: FAMILY MEDICINE

## 2020-11-27 PROCEDURE — 1126F PR PAIN SEVERITY QUANTIFIED, NO PAIN PRESENT: ICD-10-PCS | Mod: HCNC,S$GLB,, | Performed by: FAMILY MEDICINE

## 2020-11-27 PROCEDURE — 99999 PR PBB SHADOW E&M-EST. PATIENT-LVL IV: ICD-10-PCS | Mod: PBBFAC,HCNC,, | Performed by: FAMILY MEDICINE

## 2020-11-27 PROCEDURE — 87086 URINE CULTURE/COLONY COUNT: CPT | Mod: HCNC

## 2020-11-27 PROCEDURE — 1101F PT FALLS ASSESS-DOCD LE1/YR: CPT | Mod: HCNC,CPTII,S$GLB, | Performed by: FAMILY MEDICINE

## 2020-11-27 PROCEDURE — 3074F SYST BP LT 130 MM HG: CPT | Mod: HCNC,CPTII,S$GLB, | Performed by: FAMILY MEDICINE

## 2020-11-27 PROCEDURE — 96372 THER/PROPH/DIAG INJ SC/IM: CPT | Mod: HCNC,S$GLB,, | Performed by: FAMILY MEDICINE

## 2020-11-27 PROCEDURE — 99213 PR OFFICE/OUTPT VISIT, EST, LEVL III, 20-29 MIN: ICD-10-PCS | Mod: HCNC,25,S$GLB, | Performed by: FAMILY MEDICINE

## 2020-11-27 PROCEDURE — 1101F PR PT FALLS ASSESS DOC 0-1 FALLS W/OUT INJ PAST YR: ICD-10-PCS | Mod: HCNC,CPTII,S$GLB, | Performed by: FAMILY MEDICINE

## 2020-11-27 PROCEDURE — 99213 OFFICE O/P EST LOW 20 MIN: CPT | Mod: HCNC,25,S$GLB, | Performed by: FAMILY MEDICINE

## 2020-11-27 PROCEDURE — 81001 URINALYSIS AUTO W/SCOPE: CPT | Mod: HCNC,S$GLB,, | Performed by: FAMILY MEDICINE

## 2020-11-27 PROCEDURE — 96372 PR INJECTION,THERAP/PROPH/DIAG2ST, IM OR SUBCUT: ICD-10-PCS | Mod: HCNC,S$GLB,, | Performed by: FAMILY MEDICINE

## 2020-11-27 PROCEDURE — 1126F AMNT PAIN NOTED NONE PRSNT: CPT | Mod: HCNC,S$GLB,, | Performed by: FAMILY MEDICINE

## 2020-11-27 PROCEDURE — 3008F BODY MASS INDEX DOCD: CPT | Mod: HCNC,CPTII,S$GLB, | Performed by: FAMILY MEDICINE

## 2020-11-27 PROCEDURE — 3078F PR MOST RECENT DIASTOLIC BLOOD PRESSURE < 80 MM HG: ICD-10-PCS | Mod: HCNC,CPTII,S$GLB, | Performed by: FAMILY MEDICINE

## 2020-11-27 PROCEDURE — 99999 PR PBB SHADOW E&M-EST. PATIENT-LVL IV: CPT | Mod: PBBFAC,HCNC,, | Performed by: FAMILY MEDICINE

## 2020-11-27 PROCEDURE — 99499 RISK ADDL DX/OHS AUDIT: ICD-10-PCS | Mod: S$GLB,,, | Performed by: FAMILY MEDICINE

## 2020-11-27 PROCEDURE — 3074F PR MOST RECENT SYSTOLIC BLOOD PRESSURE < 130 MM HG: ICD-10-PCS | Mod: HCNC,CPTII,S$GLB, | Performed by: FAMILY MEDICINE

## 2020-11-27 RX ORDER — CEFTRIAXONE 1 G/1
1 INJECTION, POWDER, FOR SOLUTION INTRAMUSCULAR; INTRAVENOUS ONCE
Status: COMPLETED | OUTPATIENT
Start: 2020-11-27 | End: 2020-11-27

## 2020-11-27 RX ORDER — RANOLAZINE 500 MG/1
TABLET, EXTENDED RELEASE ORAL
COMMUNITY
Start: 2020-10-16

## 2020-11-27 RX ORDER — NITROFURANTOIN 25; 75 MG/1; MG/1
100 CAPSULE ORAL 2 TIMES DAILY
Qty: 14 CAPSULE | Refills: 0 | Status: SHIPPED | OUTPATIENT
Start: 2020-11-27 | End: 2020-11-27 | Stop reason: SDUPTHER

## 2020-11-27 RX ORDER — NITROFURANTOIN 25; 75 MG/1; MG/1
100 CAPSULE ORAL 2 TIMES DAILY
Qty: 14 CAPSULE | Refills: 0 | Status: SHIPPED | OUTPATIENT
Start: 2020-11-27 | End: 2020-12-04

## 2020-11-27 RX ORDER — LIDOCAINE HYDROCHLORIDE 10 MG/ML
1 INJECTION INFILTRATION; PERINEURAL ONCE
Status: COMPLETED | OUTPATIENT
Start: 2020-11-27 | End: 2020-11-27

## 2020-11-27 RX ADMIN — LIDOCAINE HYDROCHLORIDE 1 ML: 10 INJECTION INFILTRATION; PERINEURAL at 05:11

## 2020-11-27 RX ADMIN — CEFTRIAXONE 1 G: 1 INJECTION, POWDER, FOR SOLUTION INTRAMUSCULAR; INTRAVENOUS at 04:11

## 2020-11-27 NOTE — TELEPHONE ENCOUNTER
Scheduled pt to be seen by Dr. Mcallister at 3:45 today.     Attempted to call to notify - No answer, JOVANA.

## 2020-11-27 NOTE — TELEPHONE ENCOUNTER
----- Message from Grace Blankenship sent at 2020  9:47 AM CST -----  Contact: dex/wife  James Quan Jr.  MRN: [id number]  : 1945  PCP: Alex Mcallister  Home Phone      [phone]  Work Phone      Not on file.  Mobile          [phone]      MESSAGE:    Pt's wife states pt has a urostomy and urine is milky looking. Pt's daughter just tested positive for covid Wednesday and that's when his urine started looking different on that day. Would like to know if he can bring in urine and what to do regarding positive test in family.   Phone: [phone]

## 2020-11-27 NOTE — PROGRESS NOTES
Subjective:       Patient ID: James Quan Jr. is a 75 y.o. male.    Chief Complaint: cloudy urine (cloudy urine for about 5 or 6 days)    Pt is a 75 y.o. male who presents for evaluation and management of   Encounter Diagnoses   Name Primary?    Cloudy urine Yes    Urinary tract infection with hematuria, site unspecified    .noticed last couple of days   No fever     Doing well on current meds. Denies any side effects. Prevention is up to date.    Review of Systems   Constitutional: Negative for fever.   Genitourinary: Negative for dysuria.        Has illeostomy        Objective:      Physical Exam  Constitutional:       Appearance: He is well-developed.   HENT:      Head: Normocephalic and atraumatic.      Right Ear: External ear normal.      Left Ear: External ear normal.      Nose: Nose normal.   Eyes:      General: No scleral icterus.        Right eye: No discharge.         Left eye: No discharge.      Conjunctiva/sclera: Conjunctivae normal.      Pupils: Pupils are equal, round, and reactive to light.   Neck:      Musculoskeletal: Normal range of motion and neck supple.      Thyroid: No thyromegaly.      Vascular: No JVD.      Trachea: No tracheal deviation.   Cardiovascular:      Rate and Rhythm: Normal rate and regular rhythm.      Heart sounds: Normal heart sounds. No murmur.   Pulmonary:      Effort: Pulmonary effort is normal. No respiratory distress.      Breath sounds: Normal breath sounds. No wheezing or rales.   Chest:      Chest wall: No tenderness.   Abdominal:      General: Bowel sounds are normal. There is no distension.      Palpations: Abdomen is soft. There is no mass.      Tenderness: There is no abdominal tenderness. There is no guarding or rebound.   Genitourinary:     Comments: Ileal conduit bag RLQ. cloudy urine   Musculoskeletal: Normal range of motion.   Lymphadenopathy:      Cervical: No cervical adenopathy.   Skin:     General: Skin is warm and dry.   Neurological:       Mental Status: He is alert and oriented to person, place, and time.      Cranial Nerves: No cranial nerve deficit.      Motor: No abnormal muscle tone.      Coordination: Coordination normal.      Deep Tendon Reflexes: Reflexes are normal and symmetric. Reflexes normal.   Psychiatric:         Behavior: Behavior normal.         Thought Content: Thought content normal.         Judgment: Judgment normal.         Assessment:       1. Cloudy urine    2. Urinary tract infection with hematuria, site unspecified        Plan:   James was seen today for cloudy urine.    Diagnoses and all orders for this visit:    Cloudy urine  -     POCT urinalysis, dipstick or tablet reag  -     POCT URINE SEDIMENT EXAM  -     Urine culture  -     cefTRIAXone injection 1 g  -     lidocaine HCL 10 mg/ml (1%) injection 1 mL  -     nitrofurantoin, macrocrystal-monohydrate, (MACROBID) 100 MG capsule; Take 1 capsule (100 mg total) by mouth 2 (two) times daily. for 7 days    Urinary tract infection with hematuria, site unspecified  -     cefTRIAXone injection 1 g  -     lidocaine HCL 10 mg/ml (1%) injection 1 mL  -     nitrofurantoin, macrocrystal-monohydrate, (MACROBID) 100 MG capsule; Take 1 capsule (100 mg total) by mouth 2 (two) times daily. for 7 days      Problem List Items Addressed This Visit     None      Visit Diagnoses     Cloudy urine    -  Primary    Relevant Medications    cefTRIAXone injection 1 g (Start on 11/27/2020  5:45 PM)    lidocaine HCL 10 mg/ml (1%) injection 1 mL (Start on 11/27/2020  5:45 PM)    nitrofurantoin, macrocrystal-monohydrate, (MACROBID) 100 MG capsule    Other Relevant Orders    POCT urinalysis, dipstick or tablet reag    POCT URINE SEDIMENT EXAM    Urine culture    Urinary tract infection with hematuria, site unspecified        Relevant Medications    cefTRIAXone injection 1 g (Start on 11/27/2020  5:45 PM)    lidocaine HCL 10 mg/ml (1%) injection 1 mL (Start on 11/27/2020  5:45 PM)    nitrofurantoin,  macrocrystal-monohydrate, (MACROBID) 100 MG capsule        No follow-ups on file.

## 2020-11-28 LAB
BACTERIA UR CULT: NORMAL
BACTERIA UR CULT: NORMAL

## 2020-11-30 ENCOUNTER — PATIENT MESSAGE (OUTPATIENT)
Dept: FAMILY MEDICINE | Facility: CLINIC | Age: 75
End: 2020-11-30

## 2020-12-02 ENCOUNTER — PATIENT MESSAGE (OUTPATIENT)
Dept: FAMILY MEDICINE | Facility: CLINIC | Age: 75
End: 2020-12-02

## 2020-12-02 ENCOUNTER — TELEPHONE (OUTPATIENT)
Dept: FAMILY MEDICINE | Facility: CLINIC | Age: 75
End: 2020-12-02

## 2020-12-02 RX ORDER — OXYCODONE AND ACETAMINOPHEN 5; 325 MG/1; MG/1
1 TABLET ORAL EVERY 6 HOURS PRN
Qty: 60 EACH | Refills: 0 | Status: SHIPPED | OUTPATIENT
Start: 2020-12-02

## 2020-12-02 NOTE — TELEPHONE ENCOUNTER
Ok. 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 Sent in percocet. Thanks    Thanks

## 2020-12-02 NOTE — TELEPHONE ENCOUNTER
----- Message from Mau Seo sent at 2020 10:28 AM CST -----  Contact: Patient  James Quan Jr.  MRN: [id number]  : 1945  PCP: Alex Mcallister  Home Phone      [phone]  Work Phone      Not on file.  Mobile          [phone]      MESSAGE:  saw urologist - he referred him to colon specialist - they are both out of office until Monday - he has stool coming from both rectum & penis - requesting Rx for pain --- please advise    Call [phone]    PCP: Ary

## 2020-12-03 ENCOUNTER — TELEPHONE (OUTPATIENT)
Dept: FAMILY MEDICINE | Facility: CLINIC | Age: 75
End: 2020-12-03

## 2020-12-03 DIAGNOSIS — Z01.818 PREOP EXAMINATION: ICD-10-CM

## 2020-12-03 LAB
BACTERIA SPEC CULT: NORMAL /HPF
BILIRUB SERPL-MCNC: NORMAL MG/DL
BLOOD URINE, POC: NORMAL
CASTS: NORMAL
COLOR, POC UA: NORMAL
CRYSTALS: NORMAL
GLUCOSE UR QL STRIP: NORMAL
KETONES UR QL STRIP: NORMAL
LEUKOCYTE ESTERASE URINE, POC: NORMAL
NITRITE, POC UA: NORMAL
PH, POC UA: 5
PROTEIN, POC: NORMAL
RBC CELLS COUNTED: NORMAL
SPECIFIC GRAVITY, POC UA: 1.01
UROBILINOGEN, POC UA: NORMAL
WHITE BLOOD CELLS: NORMAL

## 2020-12-03 NOTE — TELEPHONE ENCOUNTER
----- Message from Lenore Levy MA sent at 12/3/2020  1:01 PM CST -----  Pt states he needs a COVID-19 test 72 hours before his colonoscopy. Please call to advise.         [phone]

## 2020-12-07 ENCOUNTER — CLINICAL SUPPORT (OUTPATIENT)
Dept: FAMILY MEDICINE | Facility: CLINIC | Age: 75
End: 2020-12-07
Payer: MEDICARE

## 2020-12-07 DIAGNOSIS — Z01.818 PREOP EXAMINATION: ICD-10-CM

## 2020-12-07 PROCEDURE — U0003 INFECTIOUS AGENT DETECTION BY NUCLEIC ACID (DNA OR RNA); SEVERE ACUTE RESPIRATORY SYNDROME CORONAVIRUS 2 (SARS-COV-2) (CORONAVIRUS DISEASE [COVID-19]), AMPLIFIED PROBE TECHNIQUE, MAKING USE OF HIGH THROUGHPUT TECHNOLOGIES AS DESCRIBED BY CMS-2020-01-R: HCPCS | Mod: HCNC

## 2020-12-09 ENCOUNTER — TELEPHONE (OUTPATIENT)
Dept: FAMILY MEDICINE | Facility: CLINIC | Age: 75
End: 2020-12-09

## 2020-12-09 LAB — SARS-COV-2 RNA RESP QL NAA+PROBE: NOT DETECTED

## 2020-12-10 ENCOUNTER — TELEPHONE (OUTPATIENT)
Dept: FAMILY MEDICINE | Facility: CLINIC | Age: 75
End: 2020-12-10

## 2020-12-10 DIAGNOSIS — C95.90 LEUKEMIA NOT HAVING ACHIEVED REMISSION, UNSPECIFIED LEUKEMIA TYPE: ICD-10-CM

## 2020-12-10 DIAGNOSIS — R62.7 ADULT FAILURE TO THRIVE SYNDROME: Primary | ICD-10-CM

## 2020-12-10 RX ORDER — OXYCODONE AND ACETAMINOPHEN 7.5; 325 MG/1; MG/1
1 TABLET ORAL EVERY 6 HOURS PRN
Qty: 60 TABLET | Refills: 0 | Status: SHIPPED | OUTPATIENT
Start: 2020-12-10 | End: 2021-01-09

## 2020-12-10 NOTE — TELEPHONE ENCOUNTER
----- Message from Mau Seo sent at 12/10/2020  5:13 PM CST -----  Contact: Wife - Elsa Quan Jr.  MRN: [id number]  : 1945  PCP: Alex Mcallister  Home Phone      [phone]  Work Phone      Not on file.  Mobile          [phone]      MESSAGE: Wife Elsa called - discussed hospice care with patient -- requesting to speak with Dr Mcallister    Call Elsa @ [phone]    PCP: Ary

## 2020-12-11 ENCOUNTER — TELEPHONE (OUTPATIENT)
Dept: FAMILY MEDICINE | Facility: CLINIC | Age: 75
End: 2020-12-11

## 2020-12-11 NOTE — TELEPHONE ENCOUNTER
Referral, demographics and clinicals faxed to [facility].    [facility]  Phone: [phone]  Fax: [phone]

## 2020-12-11 NOTE — TELEPHONE ENCOUNTER
Spoke with Navya with [facility]. ok'd that Dr Mcallister will be following provider for this patient.

## 2020-12-11 NOTE — TELEPHONE ENCOUNTER
----- Message from Grace Blankenship sent at 2020  3:22 PM CST -----  [facility]  MRN: [id number]  : 1945  PCP: Alex Mcallister  Home Phone      [phone]  Work Phone      Not on file.  Mobile          [phone]      MESSAGE:    Pt would like to know if  would be a following provider for pt on hospice.  Phone: [phone]

## 2020-12-14 ENCOUNTER — TELEPHONE (OUTPATIENT)
Dept: FAMILY MEDICINE | Facility: CLINIC | Age: 75
End: 2020-12-14

## 2020-12-14 NOTE — TELEPHONE ENCOUNTER
----- Message from Rula Robins sent at 2020  4:29 PM CST -----  [facility]  MRN: [id number]  : 1945  PCP: Alex Mcallister  Home Phone      [phone]  Work Phone      Not on file.  Mobile          [phone]      MESSAGE:     Kellen would like to see if they can start patient on Low dose fentanyl patch    [phone]

## 2021-04-05 ENCOUNTER — PES CALL (OUTPATIENT)
Dept: ADMINISTRATIVE | Facility: CLINIC | Age: 76
End: 2021-04-05

## 2021-07-15 ENCOUNTER — PES CALL (OUTPATIENT)
Dept: ADMINISTRATIVE | Facility: CLINIC | Age: 76
End: 2021-07-15

## 2022-02-21 NOTE — PROGRESS NOTES
Impression: Punctate keratitis, bilateral: H16.143.
- Onset after what appears to be a viral conjunctivitis OU.  Plan: Cont lubrication: PFATs AT OU, andreas qhs OD Test results normal. Please let Mr. Rosado know that his CT was normal. There is no evidence of pancreatitis. He does have the sigmoid diverticulosis that we knew about earlier  I will see him next week thanks

## 2022-06-09 NOTE — TELEPHONE ENCOUNTER
----- Message from Mau Seo sent at 2020  8:10 AM CST -----  Contact: Patient  James Quan Jr.  MRN: [id number]  : 1945  PCP: Alex Mcallister  Home Phone      [phone]  Work Phone      Not on file.  Mobile          [phone]      MESSAGE: scheduled for a procedure this afternoon @ 2:30 -- COvid results still not in -- please advise    Call [phone]    PCP: Ary    
Notified covid test results available. Test was negative. Will  copy to take for procedure.   
done

## 2024-05-30 NOTE — ASSESSMENT & PLAN NOTE
Resume aspirin.  BB and losartan on hold tonight.  Ranexa to be given.  Monitor BPs.    Switch from lipitor to crestor.     guarding

## (undated) DEVICE — SUT ETHILON 2-0 PSLX 30IN

## (undated) DEVICE — CLIPPER BLADE MOD 4406 (CAREF)

## (undated) DEVICE — CATH POLLACK OPEN-END FLEXI-TI

## (undated) DEVICE — SUT VICRYL PLUS 4-0 P3 18IN

## (undated) DEVICE — SEE MEDLINE ITEM 157181

## (undated) DEVICE — SUT CTD VICRYL VIL BR UR-5

## (undated) DEVICE — SEE MEDLINE ITEM 146313

## (undated) DEVICE — SUT ETHIBOND 0 CR/CTX 8-18

## (undated) DEVICE — PACK PERI/GYN OPTIMA

## (undated) DEVICE — SET IRR URLGY 2LINE UNIV SPIKE

## (undated) DEVICE — SUT CHROMIC 2-0 SH 27IN BRN

## (undated) DEVICE — SUT 2-0 VICRYL / CT-1

## (undated) DEVICE — SOL IRR WATER STRL 3000 ML

## (undated) DEVICE — SEE MEDLINE ITEM 157148

## (undated) DEVICE — SEE MEDLINE ITEM 152622

## (undated) DEVICE — SEE MEDLINE ITEM 154981

## (undated) DEVICE — TRAY MINOR GEN SURG

## (undated) DEVICE — SYR 50ML CATH TIP